# Patient Record
Sex: FEMALE | Race: WHITE | Employment: FULL TIME | URBAN - METROPOLITAN AREA
[De-identification: names, ages, dates, MRNs, and addresses within clinical notes are randomized per-mention and may not be internally consistent; named-entity substitution may affect disease eponyms.]

---

## 2017-01-27 ENCOUNTER — OFFICE VISIT (OUTPATIENT)
Dept: SURGERY | Age: 52
End: 2017-01-27

## 2017-01-27 VITALS
TEMPERATURE: 97.9 F | RESPIRATION RATE: 20 BRPM | WEIGHT: 208 LBS | DIASTOLIC BLOOD PRESSURE: 86 MMHG | HEIGHT: 64 IN | SYSTOLIC BLOOD PRESSURE: 130 MMHG | OXYGEN SATURATION: 98 % | BODY MASS INDEX: 35.51 KG/M2 | HEART RATE: 68 BPM

## 2017-01-27 DIAGNOSIS — E66.01 MORBID OBESITY DUE TO EXCESS CALORIES (HCC): Primary | ICD-10-CM

## 2017-01-27 DIAGNOSIS — D50.9 IRON DEFICIENCY ANEMIA, UNSPECIFIED IRON DEFICIENCY ANEMIA TYPE: ICD-10-CM

## 2017-01-27 DIAGNOSIS — E55.9 VITAMIN D DEFICIENCY: ICD-10-CM

## 2017-01-27 DIAGNOSIS — E53.8 VITAMIN B12 DEFICIENCY: ICD-10-CM

## 2017-01-27 DIAGNOSIS — K90.9 INTESTINAL MALABSORPTION, UNSPECIFIED TYPE: ICD-10-CM

## 2017-01-27 RX ORDER — GLUCOSAMINE SULFATE 1500 MG
POWDER IN PACKET (EA) ORAL DAILY
COMMUNITY
End: 2019-11-26

## 2017-01-27 NOTE — PROGRESS NOTES
Adalid Goodman is a 46 y.o. female 5 years and 4 months status post lap malabsorptive gastric bypass, down 47 pounds. Weight today is 208 pounds. Since last seen on May 2016, patient stated she went off Qsymia for weight loss. Stated was causing urinary tract infections. Started [facility] in October 2016, stated helpful with nutrition. Eating better and making better choices. Stated sticking to 800 calories daily, eating 5-6 small meals daily. Stated exercise needs to increase. Denies nausea, no vomiting, no heartburn/reflux. Denies dysphagia. No fever/no chills, no shortness of breath, no chest pain, and no abdominal pain. Stated having dull ache to left lower abdomen and old lap site. Stated she thinks she may have pulled a muscle when helping with a patient also concern for hernia. Tolerating all foods. Adequate protein intake. Protein shake in morning for breakfast. No snacking. Drinking 64 ounces of water daily. Tolerating all vitamins and medications. Exercising minimal. No issues with urination and bowel movements. HPI    Review of Systems   Constitutional: Negative for chills, fever and malaise/fatigue. Respiratory: Negative for cough, sputum production and shortness of breath. Cardiovascular: Negative for chest pain, palpitations and leg swelling. Gastrointestinal: Negative for abdominal pain, blood in stool, constipation, diarrhea, heartburn, nausea and vomiting. Genitourinary: Negative for dysuria. Physical Exam   Constitutional: She is oriented to person, place, and time. She appears well-developed and well-nourished. No distress. Cardiovascular: Normal rate, regular rhythm and normal heart sounds. Pulmonary/Chest: Effort normal and breath sounds normal. No respiratory distress. She has no wheezes. She has no rales. Abdominal: Soft. Bowel sounds are normal. She exhibits no distension. There is no tenderness. There is no rebound and no guarding.    Abdomen obese,non-distended. Mild tenderness to left lower abdomen at trocar site. No hernia/masses appreciated. Musculoskeletal: Normal range of motion. She exhibits no edema. Neurological: She is alert and oriented to person, place, and time. Skin: Skin is warm and dry. No rash noted. No erythema. Psychiatric: She has a normal mood and affect. Her behavior is normal. Thought content normal.   Blood pressure 130/86, pulse 68, temperature 97.9 °F (36.6 °C), resp. rate 20, height 5' 4\" (1.626 m), weight 208 lb (94.3 kg), SpO2 98 %. ASSESSMENT and PLAN  Morbid Obesity 5 years and 4 months status post lap malabsorptive gastric bypass, down 47 pounds. Weight today is 208 pounds. Commended patient for changes in eating choices and behaviors. Advised patient regard to diet that is high-protein, low-fat, low-sugar, limited carbohydrates. Strive for 60 grams of protein daily. If having a snack, foods that are protein or fiber rich. Still pay attention to behavioral factor and habits. No eating/drinking together, chew foods well, and portion control. Drink at least 40 ounces of non-carbonated, non-calorie beverages daily. Continue vitamin regiment daily. For abdominal discomfort, advised patient may use warm compress for comfort. Reviewed signs and symptoms of hernia. Discussed the need for physical activity to help facilitate more weight loss. Exercise at least 3 days a week with cardiovascular and strength training. Provided patient with routine lab work slip. Advised anything concerning with labs, will contact patient prior to next visit. Patient to follow up in 6 months toa year, sooner if necessary. Patient verbalized understanding and questions were answered to the best of my knowledge and ability. Goal setting educational materials were provided. Advised to call office if any questions/concerns.

## 2017-01-27 NOTE — PATIENT INSTRUCTIONS
Discussed biopsy results with mother. She tried felton syrup for constipation before. Prune juice.   Suggested lactulose and 2-3 week follow up. diogenes Learning About Changing a Habit by Setting Goals  How can you change a habit? If you've decided to change a habit--whether it's quitting smoking, lowering your blood pressure, becoming more active, or doing something else to improve your health--congratulations! Making that decision is the first step toward making a change. What happens next? Have a reason. Set goals you can reach. Prepare for slip-ups. And get support. What's your reason? Your reason for wanting to change a habit is really important. Maybe you want to quit smoking so that you can avoid future health problems. Or maybe you want to eat a healthier diet so you can lose weight. If you have high blood pressure, your reason may be clear: to lower your blood pressure. Maybe you smoke and want to save money on cigarettes. You need to feel ready to make a change. If you don't feel ready now, that's okay. You can still be thinking and planning. When you truly want to make changes, you're ready for the next step. It's not easy to change habits--but you can do it. Taking the time to really think about what will motivate or inspire you will help you reach your goals. How do you set goals? · Focus on small goals. This will help you reach larger goals over time. With smaller goals, you'll have success more often, which will help you stay with it. For example, your large goal may be to lose 50 pounds. Your small goal could be to lose 5.  · Write down your goals. This will help you remember, and you'll have a clearer idea of what you want to achieve. Use a journal or notebook to record your goals. Hang up your plan where you will see it often as a reminder of what you're trying to do. · Make your goals specific. Specific goals help you measure your progress. For example, setting a goal to eat 5 helpings of fruits and vegetables 5 days a week is better than a general goal to \"eat more vegetables. \"  · Focus on one goal at a time.  By doing this, you're less likely to feel overwhelmed and then give up. · When you reach a goal, reward yourself. Celebrate your new behavior and success for several days, and then think about setting your next goal.  How can you prepare for slip-ups? It's perfectly normal to try to change a habit, go along fine for a while, and then have a setback. Lots of people try and try again before they reach their goals. What are the things that might cause a setback for you? If you have tried to change a habit before, think about what helped you and what got in your way. By thinking about these barriers now, you can plan ahead for how to deal with them if they happen. There will be times when you slip up and don't make your goal for the week. When that happens, don't get mad at yourself. Learn from the experience. Ask yourself what got in the way of reaching your goal. Positive thinking goes a long way when you're making lifestyle changes. How can you get support? · Get a partner. It's motivating to know that someone is trying to make the same change that you're making, like being more active or changing your eating habits. You have someone who is counting on you to help him or her succeed. That person can also remind you how far you've come. · Get friends and family involved. They can exercise with you or encourage you by saying how they admire what you are doing. Family members can join you in your healthy eating efforts. Don't be afraid to tell family and friends that their encouragement makes a big difference to you. · Join a class or support group. People in these groups often have some of the same barriers you have. They can give you support when you don't feel like staying with your plan. They can boost your morale when you need a lift. Jessie Andrade also find a number of online support groups. · Encourage yourself. When you feel like giving up, don't waste energy feeling bad about yourself.  Remember your reason for wanting to change, think about the progress you've made, and give yourself a pep talk and a pat on the back. · Get professional help. A dietitian can help you make your diet healthier while still allowing you to eat foods that you enjoy. A  or physical therapist can help design an exercise program that is fun and easy to stay on. A counselor, a , or your doctor can help you overcome hurdles, reduce stress, or quit smoking. Where can you learn more? Go to http://florencia-maurice.info/. Enter V577 in the search box to learn more about \"Learning About Changing a Habit by Setting Goals. \"  Current as of: July 26, 2016  Content Version: 11.1  © 6280-4709 [employer]. Care instructions adapted under license by [employer] (which disclaims liability or warranty for this information). If you have questions about a medical condition or this instruction, always ask your healthcare professional. [place] [address] any warranty or liability for your use of this information.

## 2017-01-27 NOTE — MR AVS SNAPSHOT
Visit Information Date & Time Provider Department Dept. Phone Encounter #  
 1/27/2017  9:00 AM Shelley Menjivar NP [place] [id number] [phone] [id number] Upcoming Health Maintenance Date Due Hepatitis C Screening 1965 DTaP/Tdap/Td series (1 - Tdap) 12/1/1984 FOBT Q 1 YEAR AGE 50-75 12/1/2013 INFLUENZA AGE 9 TO ADULT 8/1/2016 PAP AKA CERVICAL CYTOLOGY 2/2/2018 BREAST CANCER SCRN MAMMOGRAM 3/24/2018 Allergies as of 1/27/2017  Review Complete On: 1/27/2017 By: Shelley Menjivar NP Severity Noted Reaction Type Reactions Other Medication  05/26/2011    Other (comments) Allergic to mold, pollen,cigarettes, causes burning eyes, headaches Current Immunizations  Reviewed on 9/21/2011 Name Date  
 TB Skin Test (PPD) Intradermal 6/22/2016  4:55 PM  
  
 Not reviewed this visit You Were Diagnosed With   
  
 Codes Comments Morbid obesity due to excess calories [facility]    -  Primary ICD-10-CM: E66.01 
ICD-9-CM: 278.01 Intestinal malabsorption, unspecified type     ICD-10-CM: K90.9 ICD-9-CM: 579.9 Vitamin B12 deficiency     ICD-10-CM: E53.8 ICD-9-CM: 266.2 Vitamin D deficiency     ICD-10-CM: E55.9 ICD-9-CM: 268.9 Iron deficiency anemia, unspecified iron deficiency anemia type     ICD-10-CM: D50.9 ICD-9-CM: 280.9 BMI 35.0-35.9,adult     ICD-10-CM: B28.60 ICD-9-CM: V85.35 Vitals BP Pulse Temp Resp Height(growth percentile) Weight(growth percentile) 130/86 (BP 1 Location: Left arm, BP Patient Position: Sitting) 68 97.9 °F (36.6 °C) 20 5' 4\" (1.626 m) 208 lb (94.3 kg) SpO2 BMI OB Status Smoking Status 98% 35.7 kg/m2 Ablation Never Smoker Vitals History BMI and BSA Data Body Mass Index Body Surface Area 35.7 kg/m 2 2.06 m 2 Preferred Pharmacy Pharmacy Name Phone  [facility] [address] [place] [address] [facility] [facility]   [address] [phone] Your Updated Medication List  
  
   
This list is accurate as of: 1/27/17  9:39 AM.  Always use your most recent med list.  
  
  
  
  
 ascorbic acid (vitamin C) 500 mg tablet Commonly known as:  VITAMIN C Take 500 mg by mouth daily. Biotin 2,500 mcg Cap Take 1 Cap by mouth daily. CALCIUM CITRATE + D PO Take  by mouth. * VITAMIN D3 1,000 unit Cap Generic drug:  cholecalciferol Take  by mouth daily. * cholecalciferol (vitamin D3) 50,000 unit Tab Take 1 Tab by mouth every seven (7) days. cyanocobalamin 1,000 mcg sublingual tablet Commonly known as:  VITAMIN B-12 Take 1,000 mcg by mouth daily. EFFEXOR  mg capsule Generic drug:  venlafaxine-SR Take  by mouth daily. iron aspgly,[id number]-Ca-suc [id number] mg-mg-mcg-mg Cap cap Commonly known as:  FERREX [address] Take 1 Cap by mouth daily. MULTIVITAMIN PO Take 1 Tab by mouth daily. USES CENTRUM  
  
 phentermine-topiramate 7.5-46 mg Cm24 Take  by mouth. * Notice: This list has 2 medication(s) that are the same as other medications prescribed for you. Read the directions carefully, and ask your doctor or other care provider to review them with you. We Performed the Following CBC W/O DIFF [57702 CPT(R)] IRON PROFILE [id number] CPT(R)] METABOLIC PANEL, COMPREHENSIVE [21708 CPT(R)] PTH INTACT [13942 CPT(R)] VITAMIN B12 & FOLATE [70700 CPT(R)] VITAMIN D, 25 HYDROXY [id number] CPT(R)] Patient Instructions Learning About Changing a Habit by Setting Goals How can you change a habit? If you've decided to change a habitwhether it's quitting smoking, lowering your blood pressure, becoming more active, or doing something else to improve your healthcongratulations! Making that decision is the first step toward making a change. What happens next? Have a reason. Set goals you can reach. Prepare for slip-ups. And get support. What's your reason? Your reason for wanting to change a habit is really important. Maybe you want to quit smoking so that you can avoid future health problems. Or maybe you want to eat a healthier diet so you can lose weight. If you have high blood pressure, your reason may be clear: to lower your blood pressure. Maybe you smoke and want to save money on cigarettes. You need to feel ready to make a change. If you don't feel ready now, that's okay. You can still be thinking and planning. When you truly want to make changes, you're ready for the next step. It's not easy to change habitsbut you can do it. Taking the time to really think about what will motivate or inspire you will help you reach your goals. How do you set goals? · Focus on small goals. This will help you reach larger goals over time. With smaller goals, you'll have success more often, which will help you stay with it. For example, your large goal may be to lose 50 pounds. Your small goal could be to lose 5. 
· Write down your goals. This will help you remember, and you'll have a clearer idea of what you want to achieve. Use a journal or notebook to record your goals. Hang up your plan where you will see it often as a reminder of what you're trying to do. · Make your goals specific. Specific goals help you measure your progress. For example, setting a goal to eat 5 helpings of fruits and vegetables 5 days a week is better than a general goal to \"eat more vegetables. \" 
· Focus on one goal at a time. By doing this, you're less likely to feel overwhelmed and then give up. · When you reach a goal, reward yourself. Celebrate your new behavior and success for several days, and then think about setting your next goal. 
How can you prepare for slip-ups?  
It's perfectly normal to try to change a habit, go along fine for a while, and then have a setback. Lots of people try and try again before they reach their goals. What are the things that might cause a setback for you? If you have tried to change a habit before, think about what helped you and what got in your way. By thinking about these barriers now, you can plan ahead for how to deal with them if they happen. There will be times when you slip up and don't make your goal for the week. When that happens, don't get mad at yourself. Learn from the experience. Ask yourself what got in the way of reaching your goal. Positive thinking goes a long way when you're making lifestyle changes. How can you get support? · Get a partner. It's motivating to know that someone is trying to make the same change that you're making, like being more active or changing your eating habits. You have someone who is counting on you to help him or her succeed. That person can also remind you how far you've come. · Get friends and family involved. They can exercise with you or encourage you by saying how they admire what you are doing. Family members can join you in your healthy eating efforts. Don't be afraid to tell family and friends that their encouragement makes a big difference to you. · Join a class or support group. People in these groups often have some of the same barriers you have. They can give you support when you don't feel like staying with your plan. They can boost your morale when you need a lift. Marvel Ambrosio also find a number of online support groups. · Encourage yourself. When you feel like giving up, don't waste energy feeling bad about yourself. Remember your reason for wanting to change, think about the progress you've made, and give yourself a pep talk and a pat on the back. · Get professional help. A dietitian can help you make your diet healthier while still allowing you to eat foods that you enjoy.  A  or physical therapist can help design an exercise program that is fun and easy to stay on. A counselor, a , or your doctor can help you overcome hurdles, reduce stress, or quit smoking. Where can you learn more? Go to http://florencia-maurice.info/. Enter E891 in the search box to learn more about \"Learning About Changing a Habit by Setting Goals. \" Current as of: July 26, 2016 Content Version: 11.1 © 1142-2292 [employer]. Care instructions adapted under license by [employer] (which disclaims liability or warranty for this information). If you have questions about a medical condition or this instruction, always ask your healthcare professional. [place] [address] any warranty or liability for your use of this information. Introducing [facility]! [address] introduces [employer] patient portal. Now you can access parts of your medical record, email your doctor's office, and request medication refills online. 1. In your internet browser, go to [email or web address] 2. Click on the First Time User? Click Here link in the Sign In box. You will see the New Member Sign Up page. 3. Enter your [employer] Access Code exactly as it appears below. You will not need to use this code after youve completed the sign-up process. If you do not sign up before the expiration date, you must request a new code. · [employer] Access Code: [email or web address] Expires: 4/27/2017  9:39 AM 
 
4. Enter the last four digits of your Social Security Number (xxxx) and Date of Birth (mm/dd/yyyy) as indicated and click Submit. You will be taken to the next sign-up page. 5. Create a [employer] ID. This will be your [employer] login ID and cannot be changed, so think of one that is secure and easy to remember. 6. Create a [employer] password. You can change your password at any time. 7. Enter your Password Reset Question and Answer. This can be used at a later time if you forget your password. 8. Enter your e-mail address. You will receive e-mail notification when new information is available in [address] 9. Click Sign Up. You can now view and download portions of your medical record. 10. Click the Download Summary menu link to download a portable copy of your medical information. If you have questions, please visit the Frequently Asked Questions section of the [employer] website. Remember, [employer] is NOT to be used for urgent needs. For medical emergencies, dial 911. Now available from your iPhone and Android! Please provide this summary of care documentation to your next provider. Your primary care clinician is listed as Yael Schaffer. If you have any questions after today's visit, please call [phone].

## 2017-01-27 NOTE — PROGRESS NOTES
1. Have you been to the ER, urgent care clinic since your last visit? Hospitalized since your last visit? No    2. Have you seen or consulted any other health care providers outside of the [facility] since your last visit? Include any pap smears or colon screening.  No

## 2017-01-28 LAB
25(OH)D3+25(OH)D2 SERPL-MCNC: 38.9 NG/ML (ref 30–100)
ALBUMIN SERPL-MCNC: 3.9 G/DL (ref 3.5–5.5)
ALBUMIN/GLOB SERPL: 1.4 {RATIO} (ref 1.1–2.5)
ALP SERPL-CCNC: 87 IU/L (ref 39–117)
ALT SERPL-CCNC: 24 IU/L (ref 0–32)
AST SERPL-CCNC: 21 IU/L (ref 0–40)
BILIRUB SERPL-MCNC: 0.2 MG/DL (ref 0–1.2)
BUN SERPL-MCNC: 17 MG/DL (ref 6–24)
BUN/CREAT SERPL: 23 (ref 9–23)
CALCIUM SERPL-MCNC: 8.8 MG/DL (ref 8.7–10.2)
CHLORIDE SERPL-SCNC: 103 MMOL/L (ref 96–106)
CO2 SERPL-SCNC: 27 MMOL/L (ref 18–29)
CREAT SERPL-MCNC: 0.73 MG/DL (ref 0.57–1)
ERYTHROCYTE [DISTWIDTH] IN BLOOD BY AUTOMATED COUNT: 15.1 % (ref 12.3–15.4)
FOLATE SERPL-MCNC: >20 NG/ML
GLOBULIN SER CALC-MCNC: 2.8 G/DL (ref 1.5–4.5)
GLUCOSE SERPL-MCNC: 94 MG/DL (ref 65–99)
HCT VFR BLD AUTO: 35.4 % (ref 34–46.6)
HGB BLD-MCNC: 11.4 G/DL (ref 11.1–15.9)
IRON SATN MFR SERPL: 24 % (ref 15–55)
IRON SERPL-MCNC: 86 UG/DL (ref 27–159)
MCH RBC QN AUTO: 26.2 PG (ref 26.6–33)
MCHC RBC AUTO-ENTMCNC: 32.2 G/DL (ref 31.5–35.7)
MCV RBC AUTO: 81 FL (ref 79–97)
PLATELET # BLD AUTO: 163 X10E3/UL (ref 150–379)
POTASSIUM SERPL-SCNC: 4.3 MMOL/L (ref 3.5–5.2)
PROT SERPL-MCNC: 6.7 G/DL (ref 6–8.5)
PTH-INTACT SERPL-MCNC: 32 PG/ML (ref 15–65)
RBC # BLD AUTO: 4.35 X10E6/UL (ref 3.77–5.28)
SODIUM SERPL-SCNC: 143 MMOL/L (ref 134–144)
TIBC SERPL-MCNC: 352 UG/DL (ref 250–450)
UIBC SERPL-MCNC: 266 UG/DL (ref 131–425)
VIT B12 SERPL-MCNC: >2000 PG/ML (ref 211–946)
WBC # BLD AUTO: 4.3 X10E3/UL (ref 3.4–10.8)

## 2017-02-27 ENCOUNTER — PATIENT OUTREACH (OUTPATIENT)
Dept: OTHER | Age: 52
End: 2017-02-27

## 2017-02-27 NOTE — PROGRESS NOTES
2017  Ms. Karolina Lloyd returned my call. Verified  and address for HIPAA security. Offered Employee Care Management services and discussed what could be offered with care coordination, education and support for illness, and general support for positive lifestyle changes for better health outcomes. Ms. Karolina Lloyd is taking steps for healthy lifestyle with weekly attendance at a weight loss program.  She is also interested in increasing her activity level, but acknowledges that working two jobs makes it hard to achieve her goals. She is interested in CM services. Arranged for intake on 2017 during her lunch hour. She will call me when she is free (estimating 2PM) and will be thinking on some goals she has for us to work together.

## 2017-02-27 NOTE — PROGRESS NOTES
2/27/2017   Patient on report for eligible for Case Management. Left message on voicemail. Will attempt to contact again to offer Care Management services.

## 2017-03-01 ENCOUNTER — PATIENT OUTREACH (OUTPATIENT)
Dept: OTHER | Age: 52
End: 2017-03-01

## 2017-03-02 NOTE — PROGRESS NOTES
Attempted to reach Ms. Jemima Womack for initial assessment for Care Management. 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 I called at 2:30pm and left a message for her to contact me with better times to reach her. Will try again 03/03/2017 if she does not contact me.

## 2017-03-03 ENCOUNTER — PATIENT OUTREACH (OUTPATIENT)
Dept: OTHER | Age: 52
End: 2017-03-03

## 2017-03-03 NOTE — LETTER
3/3/2017 2:20 PM 
 
Ms. Gladis Larson [address] [facility] [address] Dear Ms. Amira Dumont My name is Kim Chaivs RN, MS , Employee Care Manager for [employer] and I have been trying to reach you. The Employee Care  is a free-of-charge confidential service provided to our employees and their family memebers covered by the [facility]. The program will provide an employee and his/her family with the [employer] expertise to assist in navigating the health care delivery system, provider services, and their overall care needsso as to assure and improve health care interactions and enhance the quality of life. This program is designed to provide you with the opportunity to have a [employer] care manager partner with you for the following services: 
 
1.)  Care transitions-such as when you come home from the hospital 
2.) When help is needed to manage your disease process 3.) When you are faced with managing a chronic or complex medical condition [employer] is dedicated to empowering the good health of its community and improving the quality of care and care experiences for employees and their families. We are commited to safeguarding patient confidentiality and privacy,  assuring that every employee has the respect he or she deserves in managing their soumya. The information shared with your care manager will not be shared with anyone else aside from those you identify as part of your care team, and will only be used to assist you with any identified care needs. Please contact me if you would like this service provided to you.   
 
Sincerely, 
 
 
Kim Chavis RN

## 2017-03-03 NOTE — PROGRESS NOTES
Patient identified as eligible for [address] [place] [address] services. Third telephone outreach. First call returned with intent to enroll in CM. This is second attempted contact for completing initial assessment. Left discreet voicemail with this CM confidential contact information. Will send UTR letter.

## 2017-03-24 ENCOUNTER — PATIENT OUTREACH (OUTPATIENT)
Dept: OTHER | Age: 52
End: 2017-03-24

## 2017-03-24 NOTE — PROGRESS NOTES
Patient indicated in previous call that she was interested in [address] services. Telephone outreach attempted. Left discreet voicemail with this CM confidential contact information. Will send lost contact letter via mail. No My Chart.

## 2017-04-06 ENCOUNTER — OFFICE VISIT (OUTPATIENT)
Dept: INTERNAL MEDICINE CLINIC | Age: 52
End: 2017-04-06

## 2017-04-06 VITALS
SYSTOLIC BLOOD PRESSURE: 126 MMHG | TEMPERATURE: 98.1 F | HEIGHT: 64 IN | BODY MASS INDEX: 36.19 KG/M2 | RESPIRATION RATE: 16 BRPM | HEART RATE: 79 BPM | OXYGEN SATURATION: 98 % | DIASTOLIC BLOOD PRESSURE: 76 MMHG | WEIGHT: 212 LBS

## 2017-04-06 DIAGNOSIS — M25.511 CHRONIC RIGHT SHOULDER PAIN: ICD-10-CM

## 2017-04-06 DIAGNOSIS — G89.29 CHRONIC RIGHT SHOULDER PAIN: ICD-10-CM

## 2017-04-06 DIAGNOSIS — L20.9 ATOPIC DERMATITIS, UNSPECIFIED TYPE: ICD-10-CM

## 2017-04-06 DIAGNOSIS — L72.9 SKIN CYST: ICD-10-CM

## 2017-04-06 DIAGNOSIS — K13.0 CHEILITIS: Primary | ICD-10-CM

## 2017-04-06 DIAGNOSIS — Z98.84 HISTORY OF GASTRIC BYPASS: ICD-10-CM

## 2017-04-06 DIAGNOSIS — E61.1 IRON DEFICIENCY: ICD-10-CM

## 2017-04-06 RX ORDER — TRIAMCINOLONE ACETONIDE 5 MG/G
OINTMENT TOPICAL 2 TIMES DAILY
Qty: 30 G | Refills: 2 | Status: SHIPPED | OUTPATIENT
Start: 2017-04-06 | End: 2017-04-11

## 2017-04-06 RX ORDER — TRIAMCINOLONE ACETONIDE 1 MG/G
OINTMENT TOPICAL 2 TIMES DAILY
Qty: 30 G | Refills: 2 | Status: SHIPPED | OUTPATIENT
Start: 2017-04-06 | End: 2017-04-11

## 2017-04-06 RX ORDER — FLUCONAZOLE 200 MG/1
TABLET ORAL
Qty: 15 TAB | Refills: 0 | Status: SHIPPED | OUTPATIENT
Start: 2017-04-06 | End: 2017-08-11

## 2017-04-06 RX ORDER — NYSTATIN 100000 U/G
OINTMENT TOPICAL
Qty: 30 G | Refills: 1 | Status: SHIPPED | OUTPATIENT
Start: 2017-04-06 | End: 2017-08-11

## 2017-04-06 RX ORDER — IRON,CARBONYL/ASCORBIC ACID 65MG-125MG
TABLET, DELAYED RELEASE (ENTERIC COATED) ORAL
Refills: 2 | COMMUNITY
Start: 2017-02-08 | End: 2019-11-26

## 2017-04-06 NOTE — MR AVS SNAPSHOT
Visit Information Date & Time Provider Department Dept. Phone Encounter #  
 4/6/2017  1:30 PM Ruslan Santos, [address] [place] [address], Ne and Internal Medicine [phone] [id number] Follow-up Instructions Return if symptoms worsen or fail to improve. Upcoming Health Maintenance Date Due Hepatitis C Screening 1965 DTaP/Tdap/Td series (1 - Tdap) 12/1/1984 FOBT Q 1 YEAR AGE 50-75 12/1/2013 INFLUENZA AGE 9 TO ADULT 8/1/2016 PAP AKA CERVICAL CYTOLOGY 2/2/2018 BREAST CANCER SCRN MAMMOGRAM 3/24/2018 Allergies as of 4/6/2017  Review Complete On: 4/6/2017 By: Ruslan Santos MD  
  
 Severity Noted Reaction Type Reactions Other Medication  05/26/2011    Other (comments) Allergic to mold, pollen,cigarettes, causes burning eyes, headaches Current Immunizations  Reviewed on 9/21/2011 Name Date  
 TB Skin Test (PPD) Intradermal 6/22/2016  4:55 PM  
  
 Not reviewed this visit You Were Diagnosed With   
  
 Codes Comments Cheilitis    -  Primary ICD-10-CM: K13.0 ICD-9-CM: 528.5 Atopic dermatitis, unspecified type     ICD-10-CM: L20.9 ICD-9-CM: 691.8 History of gastric bypass     ICD-10-CM: Z98.890 ICD-9-CM: V45.86 Iron deficiency     ICD-10-CM: E61.1 ICD-9-CM: 280.9 Skin cyst     ICD-10-CM: L72.9 ICD-9-CM: 706. 2 Chronic right shoulder pain     ICD-10-CM: M25.511, G89.29 ICD-9-CM: 719.41, 338.29 Vitals BP Pulse Temp Resp Height(growth percentile) Weight(growth percentile) 126/76 (BP 1 Location: Left arm, BP Patient Position: Sitting) 79 98.1 °F (36.7 °C) (Oral) 16 5' 4\" (1.626 m) 212 lb (96.2 kg) SpO2 BMI OB Status Smoking Status 98% 36.39 kg/m2 Ablation Never Smoker BMI and BSA Data Body Mass Index Body Surface Area  
 36.39 kg/m 2 2.08 m 2 Preferred Pharmacy Pharmacy Name Phone  [address] [phone] Your Updated Medication List  
  
   
This list is accurate as of: 4/6/17  2:33 PM.  Always use your most recent med list.  
  
  
  
  
 ascorbic acid (vitamin C) 500 mg tablet Commonly known as:  VITAMIN C Take 500 mg by mouth daily. Biotin 2,500 mcg Cap Take 1 Cap by mouth daily. CALCIUM CITRATE + D PO Take  by mouth. * VITAMIN D3 1,000 unit Cap Generic drug:  cholecalciferol Take  by mouth daily. * cholecalciferol (vitamin D3) 50,000 unit Tab Take 1 Tab by mouth every seven (7) days. cyanocobalamin 1,000 mcg sublingual tablet Commonly known as:  VITAMIN B-12 Take 1,000 mcg by mouth daily. EFFEXOR  mg capsule Generic drug:  venlafaxine-SR Take  by mouth daily. fluconazole 200 mg tablet Commonly known as:  DIFLUCAN Take 2 tabs once for first day's dose, then 1 PO daily for 7-14 days as directed for cheilitis. iron aspgly,[id number]-O-S47-FA-Ca-suc 060-20-14-4 mg-mg-mcg-mg Cap cap Commonly known as:  FERREX [address] Take 1 Cap by mouth daily. MULTIVITAMIN PO Take 1 Tab by mouth daily. USES CENTRUM  
  
 nystatin 100,000 unit/gram ointment Commonly known as:  MYCOSTATIN Use 3-4 times daily to rash around mouth/lips, as directed. phentermine-topiramate 7.5-46 mg Cm24 Take  by mouth. * triamcinolone acetonide 0.1 % ointment Commonly known as:  KENALOG Apply  to affected area two (2) times a day for 5 days. use thin layer to eyebrow/eyelid rash * triamcinolone acetonide 0.5 % ointment Commonly known as:  KENALOG Apply  to affected area two (2) times a day for 5 days. use thin layer to eyebrow/eyelid rash VITRON-C 65 mg iron- 125 mg Tbec Generic drug:  iron,carbonyl-vitamin C TK 1 T PO BID * Notice: This list has 4 medication(s) that are the same as other medications prescribed for you.  Read the directions carefully, and ask your doctor or other care provider to review them with you. Prescriptions Printed Refills  
 triamcinolone acetonide (KENALOG) 0.5 % ointment 2 Sig: Apply  to affected area two (2) times a day for 5 days. use thin layer to eyebrow/eyelid rash Class: Print Route: Topical  
 fluconazole (DIFLUCAN) 200 mg tablet 0 Sig: Take 2 tabs once for first day's dose, then 1 PO daily for 7-14 days as directed for cheilitis. Class: Print Prescriptions Sent to Pharmacy Refills  
 triamcinolone acetonide (KENALOG) 0.1 % ointment 2 Sig: Apply  to affected area two (2) times a day for 5 days. use thin layer to eyebrow/eyelid rash Class: Normal  
 Pharmacy: [address] Ph #: [phone] Route: Topical  
 nystatin (MYCOSTATIN) 100,000 unit/gram ointment 1 Sig: Use 3-4 times daily to rash around mouth/lips, as directed. Class: Normal  
 Pharmacy: [address] Ph #: [phone] We Performed the Following CBC WITH AUTOMATED DIFF [48010 CPT(R)] FERRITIN [94848 CPT(R)] IRON PROFILE [id number] CPT(R)] REFERRAL TO ORTHOPEDICS [[id number] Custom] Comments:  
 Please evaluate patient for right forearm cyst, right shoulder pain. REFERRAL TO ORTHOPEDICS [[id number] Custom] Comments:  
 Please evaluate patient for right forearm cyst, right shoulder pain. VITAMIN D, 25 HYDROXY [id number] CPT(R)] Follow-up Instructions Return if symptoms worsen or fail to improve. To-Do List   
 04/13/2017 12:15 PM  
  Appointment with [facility] 4 at [address] [place] [address] ([phone]) Shower or bathe using soap and water.   Do not use deodorant, powder, perfumes, or lotion the day of your exam.  If your prior mammograms were not performed at [facility] please bring films with you or forward prior images 2 days before your procedure. Check in at registration 15min before your appointment time unless you were instructed to do otherwise. A script is not necessary, but if you have one, please bring it on the day of the mammogram or have it faxed to the department. [facility] [phone] [facility]  [phone] [facility] [address] [place]  [phone] [facility] [phone] [facility] 30[address] [facility] Janet Mccoy [phone] Referral Information Referral ID Referred By Referred To  
  
 [id number] Sana Manzano MD   
   [address] Phone: [phone] Fax: [phone] Visits Status Start Date End Date 1 New Request 4/6/17 4/6/18 If your referral has a status of pending review or denied, additional information will be sent to support the outcome of this decision. Referral ID Referred By Referred To  
 [id number] Eliezer Morataya MD  
   [address] [facility] [address] [facility], [address] Phone: [phone] Fax: [phone] Visits Status Start Date End Date 1 New Request 4/6/17 4/6/18 If your referral has a status of pending review or denied, additional information will be sent to support the outcome of this decision. Patient Instructions Maritza Foley 1728 What is a living will? A living will is a legal form you use to write down the kind of care you want at the end of your life. It is used by the health professionals who will treat you if you aren't able to decide for yourself. If you put your wishes in writing, your loved ones and others will know what kind of care you want. They won't need to guess. This can ease your mind and be helpful to others. A living will is not the same as an estate or property will. An estate will explains what you want to happen with your money and property after you die. Is a living will a legal document? A living will is a legal document. Each state has its own laws about living gary. If you move to another state, make sure that your living will is legal in the state where you now live. Or you might use a universal form that has been approved by many states. This kind of form can sometimes be completed and stored online. Your electronic copy will then be available wherever you have a connection to the Internet. In most cases, doctors will respect your wishes even if you have a form from a different state. · You don't need an  to complete a living will. But legal advice can be helpful if your state's laws are unclear, your health history is complicated, or your family can't agree on what should be in your living will. · You can change your living will at any time. Some people find that their wishes about end-of-life care change as their health changes. · In addition to making a living will, think about completing a medical power of  form. This form lets you name the person you want to make end-of-life treatment decisions for you (your \"health care agent\") if you're not able to. Many hospitals and nursing homes will give you the forms you need to complete a living will and a medical power of . · Your living will is used only if you can't make or communicate decisions for yourself anymore. If you become able to make decisions again, you can accept or refuse any treatment, no matter what you wrote in your living will. · Your state may offer an online registry. This is a place where you can store your living will online so the doctors and nurses who need to treat you can find it right away. What should you think about when creating a living will? Talk about your end-of-life wishes with your family members and your doctor. Let them know what you want. That way the people making decisions for you won't be surprised by your choices. Think about these questions as you make your living will: · Do you know enough about life support methods that might be used? If not, talk to your doctor so you know what might be done if you can't breathe on your own, your heart stops, or you're unable to swallow. · What things would you still want to be able to do after you receive life-support methods? Would you want to be able to walk? To speak? To eat on your own? To live without the help of machines? · If you have a choice, where do you want to be cared for? In your home? At a hospital or nursing home? · Do you want certain [employer] practices performed if you become very ill? · If you have a choice at the end of your life, where would you prefer to die? At home? In a hospital or nursing home? Somewhere else? · Would you prefer to be buried or cremated? · Do you want your organs to be donated after you die? What should you do with your living will? · Make sure that your family members and your health care agent have copies of your living will. · Give your doctor a copy of your living will to keep in your medical record. If you have more than one doctor, make sure that each one has a copy. · You may want to put a copy of your living will where it can be easily found. Where can you learn more? Go to http://florencia-maurice.info/. Enter D348 in the search box to learn more about \"Learning About Living Nehemias Lockett. \" Current as of: February 24, 2016 Content Version: 11.2 © 3720-2646 [employer]. Care instructions adapted under license by [employer] (which disclaims liability or warranty for this information). If you have questions about a medical condition or this instruction, always ask your healthcare professional. [place] [address] any warranty or liability for your use of this information. Advance Directives: Care Instructions Your Care Instructions An advance directive is a legal way to state your wishes at the end of your life. It tells your family and your doctor what to do if you can no longer say what you want. There are two main types of advance directives. You can change them any time that your wishes change. · A living will tells your family and your doctor your wishes about life support and other treatment. · A durable power of  for health care lets you name a person to make treatment decisions for you when you can't speak for yourself. This person is called a health care agent. If you do not have an advance directive, decisions about your medical care may be made by a doctor or a  who doesn't know you. It may help to think of an advance directive as a gift to the people who care for you. If you have one, they won't have to make tough decisions by themselves. Follow-up care is a key part of your treatment and safety. Be sure to make and go to all appointments, and call your doctor if you are having problems. It's also a good idea to know your test results and keep a list of the medicines you take. How can you care for yourself at home? · Discuss your wishes with your loved ones and your doctor. This way, there are no surprises. · Many states have a unique form. Or you might use a universal form that has been approved by many states. This kind of form can sometimes be completed and stored online. Your electronic copy will then be available wherever you have a connection to the Internet. In most cases, doctors will respect your wishes even if you have a form from a different state. · You don't need a  to do an advance directive. But you may want to get legal advice. · Think about these questions when you prepare an advance directive: ¨ Who do you want to make decisions about your medical care if you are not able to? Many people choose a family member or close friend. ¨ Do you know enough about life support methods that might be used? If not, talk to your doctor so you understand. ¨ What are you most afraid of that might happen? You might be afraid of having pain, losing your independence, or being kept alive by machines. ¨ Where would you prefer to die? Choices include your home, a hospital, or a nursing home. ¨ Would you like to have information about hospice care to support you and your family? ¨ Do you want to donate organs when you die? ¨ Do you want certain Adventist practices performed before you die? If so, put your wishes in the advance directive. · Read your advance directive every year, and make changes as needed. When should you call for help? Be sure to contact your doctor if you have any questions. Where can you learn more? Go to http://florencia-maurice.info/. Enter R264 in the search box to learn more about \"Advance Directives: Care Instructions. \" Current as of: November 17, 2016 Content Version: 11.2 © 9104-5013 [employer]. Care instructions adapted under license by [employer] (which disclaims liability or warranty for this information). If you have questions about a medical condition or this instruction, always ask your healthcare professional. [place] [address] any warranty or liability for your use of this information. Introducing \[facility]\"" & HEALTH SERVICES! Lazarus Epstein introduces Imagiin. patient portal. Now you can access parts of your medical record, email your doctor's office, and request medication refills online. 1. In your internet browser, go to [email or web address] 2. Click on the First Time User? Click Here link in the Sign In box. You will see the New Member Sign Up page. 3. Enter your Imagiin. Access Code exactly as it appears below. You will not need to use this code after youve completed the sign-up process.  If you do not sign up before the expiration date, you must request a new code. · TheMobileGamer (TMG) Access Code: [email or web address] Expires: 4/27/2017 10:39 AM 
 
4. Enter the last four digits of your Social Security Number (xxxx) and Date of Birth (mm/dd/yyyy) as indicated and click Submit. You will be taken to the next sign-up page. 5. Create a TheMobileGamer (TMG) ID. This will be your TheMobileGamer (TMG) login ID and cannot be changed, so think of one that is secure and easy to remember. 6. Create a TheMobileGamer (TMG) password. You can change your password at any time. 7. Enter your Password Reset Question and Answer. This can be used at a later time if you forget your password. 8. Enter your e-mail address. You will receive e-mail notification when new information is available in [address] 9. Click Sign Up. You can now view and download portions of your medical record. 10. Click the Download Summary menu link to download a portable copy of your medical information. If you have questions, please visit the Frequently Asked Questions section of the TheMobileGamer (TMG) website. Remember, TheMobileGamer (TMG) is NOT to be used for urgent needs. For medical emergencies, dial 911. Now available from your iPhone and Android! Please provide this summary of care documentation to your next provider. Your primary care clinician is listed as Stan Escamilla. If you have any questions after today's visit, please call [phone].

## 2017-04-06 NOTE — PROGRESS NOTES
HISTORY OF PRESENT ILLNESS  Aguila Grajeda is a 46 y.o. female. HPI  Here to establish care for rash face/eyes/osman-oral and nodule right forearm. Chief Complaint   Patient presents with    Rash     face, eyes    Nodule     right forearm, patient states it has been present for more than one year, now having some discomfort     Notes had used Desonide topically (had for her eyes), TAC, and triple antibiotic, which didn't help. She notes used a zinc oxide cream which helped only slightly. She had seen dermatology prior, but didn't take current insurance. ROS  Complete ROS negative except as indicated in note. Blood pressure 126/76, pulse 79, temperature 98.1 °F (36.7 °C), temperature source Oral, resp. rate 16, height 5' 4\" (1.626 m), weight 212 lb (96.2 kg), SpO2 98 %. Physical Exam   Constitutional: She appears well-developed and well-nourished. No distress. HENT:   Head: Normocephalic and atraumatic. Mouth/Throat: Oropharynx is clear and moist. No oropharyngeal exudate. No thrush noted. Mild cheilitis bilat angles mouth. Eyes: Conjunctivae are normal. Right eye exhibits no discharge. Left eye exhibits no discharge. No scleral icterus. Minimal erythema upper eyelids bilat. Neck: Neck supple. Cardiovascular: Normal rate, regular rhythm, normal heart sounds and intact distal pulses. Exam reveals no gallop and no friction rub. No murmur heard. Pulmonary/Chest: Effort normal and breath sounds normal. No respiratory distress. She has no wheezes. She has no rales. She exhibits no tenderness. Abdominal: She exhibits no distension. Musculoskeletal: She exhibits no edema or tenderness. Neurological: She is alert. She exhibits normal muscle tone. Coordination normal.   Skin: Skin is warm. Rash noted. She is not diaphoretic. No erythema. No pallor. Psychiatric: She has a normal mood and affect.  Her behavior is normal. Judgment and thought content normal. ASSESSMENT and PLAN    ICD-10-CM ICD-9-CM    1. Cheilitis K13.0 528.5 fluconazole (DIFLUCAN) 200 mg tablet      nystatin (MYCOSTATIN) 100,000 unit/gram ointment   2. Atopic dermatitis, unspecified type L20.9 691.8 triamcinolone acetonide (KENALOG) 0.1 % ointment      triamcinolone acetonide (KENALOG) 0.5 % ointment   3. History of gastric bypass Z98.890 V45.86 VITAMIN D, 25 HYDROXY   4. Iron deficiency E61.1 280.9 CBC WITH AUTOMATED DIFF      IRON PROFILE      FERRITIN   5. Skin cyst L72.9 706.2 REFERRAL TO ORTHOPEDICS      REFERRAL TO ORTHOPEDICS   6. Chronic right shoulder pain M25.511 719.41 REFERRAL TO ORTHOPEDICS    G89.29 338.29 REFERRAL TO ORTHOPEDICS       1. Topical anti-fungal reviewed. If not effective, trial PO fluconazole reviewed. 3,4: Labs reviewed. Follow-up Disposition:  Return if symptoms worsen or fail to improve. reviewed medications and side effects in detail    Plan and evaluation (above) reviewed with pt at visit  Patient voiced understanding of plan and provided with time to ask/review questions. After Visit Summary (AVS) provided to pt after visit with additional instructions as needed/reviewed.

## 2017-04-06 NOTE — PATIENT INSTRUCTIONS
Learning About Living Yuniel Regalado  What is a living will? A living will is a legal form you use to write down the kind of care you want at the end of your life. It is used by the health professionals who will treat you if you aren't able to decide for yourself. If you put your wishes in writing, your loved ones and others will know what kind of care you want. They won't need to guess. This can ease your mind and be helpful to others. A living will is not the same as an estate or property will. An estate will explains what you want to happen with your money and property after you die. Is a living will a legal document? A living will is a legal document. Each state has its own laws about living gary. If you move to another state, make sure that your living will is legal in the state where you now live. Or you might use a universal form that has been approved by many states. This kind of form can sometimes be completed and stored online. Your electronic copy will then be available wherever you have a connection to the Internet. In most cases, doctors will respect your wishes even if you have a form from a different state. · You don't need an  to complete a living will. But legal advice can be helpful if your state's laws are unclear, your health history is complicated, or your family can't agree on what should be in your living will. · You can change your living will at any time. Some people find that their wishes about end-of-life care change as their health changes. · In addition to making a living will, think about completing a medical power of  form. This form lets you name the person you want to make end-of-life treatment decisions for you (your \"health care agent\") if you're not able to. Many hospitals and nursing homes will give you the forms you need to complete a living will and a medical power of .   · Your living will is used only if you can't make or communicate decisions for yourself anymore. If you become able to make decisions again, you can accept or refuse any treatment, no matter what you wrote in your living will. · Your state may offer an online registry. This is a place where you can store your living will online so the doctors and nurses who need to treat you can find it right away. What should you think about when creating a living will? Talk about your end-of-life wishes with your family members and your doctor. Let them know what you want. That way the people making decisions for you won't be surprised by your choices. Think about these questions as you make your living will:  · Do you know enough about life support methods that might be used? If not, talk to your doctor so you know what might be done if you can't breathe on your own, your heart stops, or you're unable to swallow. · What things would you still want to be able to do after you receive life-support methods? Would you want to be able to walk? To speak? To eat on your own? To live without the help of machines? · If you have a choice, where do you want to be cared for? In your home? At a hospital or nursing home? · Do you want certain Jew practices performed if you become very ill? · If you have a choice at the end of your life, where would you prefer to die? At home? In a hospital or nursing home? Somewhere else? · Would you prefer to be buried or cremated? · Do you want your organs to be donated after you die? What should you do with your living will? · Make sure that your family members and your health care agent have copies of your living will. · Give your doctor a copy of your living will to keep in your medical record. If you have more than one doctor, make sure that each one has a copy. · You may want to put a copy of your living will where it can be easily found. Where can you learn more? Go to http://florencia-maurice.info/.   Enter Z716 in the search box to learn more about \"Learning About Living Elizabeth. \"  Current as of: February 24, 2016  Content Version: 11.2  © 8779-1519 [employer] Care instructions adapted under license by [employer] (which disclaims liability or warranty for this information). If you have questions about a medical condition or this instruction, always ask your healthcare professional. [address] any warranty or liability for your use of this information. Advance Directives: Care Instructions  Your Care Instructions  An advance directive is a legal way to state your wishes at the end of your life. It tells your family and your doctor what to do if you can no longer say what you want. There are two main types of advance directives. You can change them any time that your wishes change. · A living will tells your family and your doctor your wishes about life support and other treatment. · A durable power of  for health care lets you name a person to make treatment decisions for you when you can't speak for yourself. This person is called a health care agent. If you do not have an advance directive, decisions about your medical care may be made by a doctor or a  who doesn't know you. It may help to think of an advance directive as a gift to the people who care for you. If you have one, they won't have to make tough decisions by themselves. Follow-up care is a key part of your treatment and safety. Be sure to make and go to all appointments, and call your doctor if you are having problems. It's also a good idea to know your test results and keep a list of the medicines you take. How can you care for yourself at home? · Discuss your wishes with your loved ones and your doctor. This way, there are no surprises. · Many states have a unique form. Or you might use a universal form that has been approved by many states. This kind of form can sometimes be completed and stored online.  Your electronic copy will then be available wherever you have a connection to the Internet. In most cases, doctors will respect your wishes even if you have a form from a different state. · You don't need a  to do an advance directive. But you may want to get legal advice. · Think about these questions when you prepare an advance directive:  ¨ Who do you want to make decisions about your medical care if you are not able to? Many people choose a family member or close friend. ¨ Do you know enough about life support methods that might be used? If not, talk to your doctor so you understand. ¨ What are you most afraid of that might happen? You might be afraid of having pain, losing your independence, or being kept alive by machines. ¨ Where would you prefer to die? Choices include your home, a hospital, or a nursing home. ¨ Would you like to have information about hospice care to support you and your family? ¨ Do you want to donate organs when you die? ¨ Do you want certain Buddhist practices performed before you die? If so, put your wishes in the advance directive. · Read your advance directive every year, and make changes as needed. When should you call for help? Be sure to contact your doctor if you have any questions. Where can you learn more? Go to http://florencia-maurice.info/. Enter R264 in the search box to learn more about \"Advance Directives: Care Instructions. \"  Current as of: November 17, 2016  Content Version: 11.2  © 4378-0202 [employer]. Care instructions adapted under license by [employer] (which disclaims liability or warranty for this information). If you have questions about a medical condition or this instruction, always ask your healthcare professional. [address] any warranty or liability for your use of this information.

## 2017-04-06 NOTE — PROGRESS NOTES
RM # [id number]    Chief Complaint   Patient presents with    Rash     face, eyes    Nodule     right forearm, patient states it has been present for more than one year, now having some discomfort     1. Have you been to the ER, urgent care clinic since your last visit? Hospitalized since your last visit? No    2. Have you seen or consulted any other health care providers outside of the [address] [place] [address] since your last visit? Include any pap smears or colon screening.  No    Patient does not have a living will, would like information, will include in AVS.

## 2017-04-07 LAB
25(OH)D3+25(OH)D2 SERPL-MCNC: 33.6 NG/ML (ref 30–100)
BASOPHILS # BLD AUTO: 0 X10E3/UL (ref 0–0.2)
BASOPHILS NFR BLD AUTO: 0 %
EOSINOPHIL # BLD AUTO: 0.1 X10E3/UL (ref 0–0.4)
EOSINOPHIL NFR BLD AUTO: 2 %
ERYTHROCYTE [DISTWIDTH] IN BLOOD BY AUTOMATED COUNT: 15.6 % (ref 12.3–15.4)
FERRITIN SERPL-MCNC: 12 NG/ML (ref 15–150)
HCT VFR BLD AUTO: 37.7 % (ref 34–46.6)
HGB BLD-MCNC: 12.6 G/DL (ref 11.1–15.9)
IMM GRANULOCYTES # BLD: 0 X10E3/UL (ref 0–0.1)
IMM GRANULOCYTES NFR BLD: 0 %
IRON SATN MFR SERPL: 37 % (ref 15–55)
IRON SERPL-MCNC: 143 UG/DL (ref 27–159)
LYMPHOCYTES # BLD AUTO: 2.3 X10E3/UL (ref 0.7–3.1)
LYMPHOCYTES NFR BLD AUTO: 39 %
MCH RBC QN AUTO: 26.6 PG (ref 26.6–33)
MCHC RBC AUTO-ENTMCNC: 33.4 G/DL (ref 31.5–35.7)
MCV RBC AUTO: 80 FL (ref 79–97)
MONOCYTES # BLD AUTO: 0.8 X10E3/UL (ref 0.1–0.9)
MONOCYTES NFR BLD AUTO: 13 %
NEUTROPHILS # BLD AUTO: 2.8 X10E3/UL (ref 1.4–7)
NEUTROPHILS NFR BLD AUTO: 46 %
PLATELET # BLD AUTO: 188 X10E3/UL (ref 150–379)
RBC # BLD AUTO: 4.74 X10E6/UL (ref 3.77–5.28)
TIBC SERPL-MCNC: 383 UG/DL (ref 250–450)
UIBC SERPL-MCNC: 240 UG/DL (ref 131–425)
WBC # BLD AUTO: 6 X10E3/UL (ref 3.4–10.8)

## 2017-04-14 ENCOUNTER — HOSPITAL ENCOUNTER (OUTPATIENT)
Dept: MAMMOGRAPHY | Age: 52
Discharge: HOME | End: 2017-04-14
Attending: FAMILY MEDICINE
Payer: COMMERCIAL

## 2017-04-14 DIAGNOSIS — Z12.31 VISIT FOR SCREENING MAMMOGRAM: ICD-10-CM

## 2017-04-14 PROCEDURE — 77067 SCR MAMMO BI INCL CAD: CPT

## 2017-04-24 ENCOUNTER — PATIENT OUTREACH (OUTPATIENT)
Dept: OTHER | Age: 52
End: 2017-04-24

## 2017-04-24 NOTE — PROGRESS NOTES
Patient indicated interest in [address] [place] [address] services in a previous call. Telephone outreach attempted. Left discreet voicemail with this CM confidential contact information. Will send a new lost contact letter via mail.

## 2017-05-26 ENCOUNTER — PATIENT OUTREACH (OUTPATIENT)
Dept: OTHER | Age: 52
End: 2017-05-26

## 2017-05-26 NOTE — PROGRESS NOTES
Resolving current episode for case management due to patient lost to follow up. Patient has not been reached after repeated calls and letters. Final call made today to attempt to contact and discreet message left on voicemail. Letter sent to patient notifying completion of services due to unable to reach. This writer's contact information and information regarding program services included in materials sent. Will remain available should patient request re-initiation of case management or transitions of care services.

## 2017-06-05 ENCOUNTER — APPOINTMENT (OUTPATIENT)
Dept: CT IMAGING | Age: 52
End: 2017-06-05
Attending: PHYSICIAN ASSISTANT
Payer: COMMERCIAL

## 2017-06-05 ENCOUNTER — HOSPITAL ENCOUNTER (EMERGENCY)
Age: 52
Discharge: HOME | End: 2017-06-05
Attending: EMERGENCY MEDICINE
Payer: COMMERCIAL

## 2017-06-05 VITALS
HEIGHT: 64 IN | TEMPERATURE: 97.6 F | RESPIRATION RATE: 16 BRPM | OXYGEN SATURATION: 95 % | BODY MASS INDEX: 37.56 KG/M2 | HEART RATE: 66 BPM | SYSTOLIC BLOOD PRESSURE: 125 MMHG | DIASTOLIC BLOOD PRESSURE: 69 MMHG | WEIGHT: 220 LBS

## 2017-06-05 DIAGNOSIS — N20.0 KIDNEY STONE: Primary | ICD-10-CM

## 2017-06-05 LAB
ALBUMIN SERPL BCP-MCNC: 3.5 G/DL (ref 3.5–5)
ALBUMIN/GLOB SERPL: 0.9 {RATIO} (ref 1.1–2.2)
ALP SERPL-CCNC: 110 U/L (ref 45–117)
ALT SERPL-CCNC: 30 U/L (ref 12–78)
ANION GAP BLD CALC-SCNC: 7 MMOL/L (ref 5–15)
APPEARANCE UR: ABNORMAL
AST SERPL W P-5'-P-CCNC: 23 U/L (ref 15–37)
BACTERIA URNS QL MICRO: ABNORMAL /HPF
BASOPHILS # BLD AUTO: 0 K/UL (ref 0–0.1)
BASOPHILS # BLD: 0 % (ref 0–1)
BILIRUB SERPL-MCNC: 0.2 MG/DL (ref 0.2–1)
BILIRUB UR QL: NEGATIVE
BUN SERPL-MCNC: 23 MG/DL (ref 6–20)
BUN/CREAT SERPL: 26 (ref 12–20)
CALCIUM SERPL-MCNC: 9 MG/DL (ref 8.5–10.1)
CAOX CRY URNS QL MICRO: ABNORMAL
CHLORIDE SERPL-SCNC: 107 MMOL/L (ref 97–108)
CO2 SERPL-SCNC: 28 MMOL/L (ref 21–32)
COLOR UR: ABNORMAL
CREAT SERPL-MCNC: 0.9 MG/DL (ref 0.55–1.02)
EOSINOPHIL # BLD: 0.1 K/UL (ref 0–0.4)
EOSINOPHIL NFR BLD: 1 % (ref 0–7)
EPITH CASTS URNS QL MICRO: ABNORMAL /LPF
ERYTHROCYTE [DISTWIDTH] IN BLOOD BY AUTOMATED COUNT: 15 % (ref 11.5–14.5)
GLOBULIN SER CALC-MCNC: 4 G/DL (ref 2–4)
GLUCOSE SERPL-MCNC: 105 MG/DL (ref 65–100)
GLUCOSE UR STRIP.AUTO-MCNC: NEGATIVE MG/DL
HCT VFR BLD AUTO: 36.1 % (ref 35–47)
HGB BLD-MCNC: 11.3 G/DL (ref 11.5–16)
HGB UR QL STRIP: ABNORMAL
KETONES UR QL STRIP.AUTO: NEGATIVE MG/DL
LEUKOCYTE ESTERASE UR QL STRIP.AUTO: ABNORMAL
LIPASE SERPL-CCNC: 199 U/L (ref 73–393)
LYMPHOCYTES # BLD AUTO: 36 % (ref 12–49)
LYMPHOCYTES # BLD: 2.1 K/UL (ref 0.8–3.5)
MCH RBC QN AUTO: 25.6 PG (ref 26–34)
MCHC RBC AUTO-ENTMCNC: 31.3 G/DL (ref 30–36.5)
MCV RBC AUTO: 81.7 FL (ref 80–99)
MONOCYTES # BLD: 0.6 K/UL (ref 0–1)
MONOCYTES NFR BLD AUTO: 10 % (ref 5–13)
NEUTS SEG # BLD: 3.2 K/UL (ref 1.8–8)
NEUTS SEG NFR BLD AUTO: 53 % (ref 32–75)
NITRITE UR QL STRIP.AUTO: NEGATIVE
PH UR STRIP: 6 [PH] (ref 5–8)
PLATELET # BLD AUTO: 186 K/UL (ref 150–400)
POTASSIUM SERPL-SCNC: 3.9 MMOL/L (ref 3.5–5.1)
PROT SERPL-MCNC: 7.5 G/DL (ref 6.4–8.2)
PROT UR STRIP-MCNC: NEGATIVE MG/DL
RBC # BLD AUTO: 4.42 M/UL (ref 3.8–5.2)
RBC #/AREA URNS HPF: ABNORMAL /HPF (ref 0–5)
SODIUM SERPL-SCNC: 142 MMOL/L (ref 136–145)
SP GR UR REFRACTOMETRY: 1.02 (ref 1–1.03)
UA: UC IF INDICATED,UAUC: ABNORMAL
UROBILINOGEN UR QL STRIP.AUTO: 0.2 EU/DL (ref 0.2–1)
WBC # BLD AUTO: 6 K/UL (ref 3.6–11)
WBC URNS QL MICRO: ABNORMAL /HPF (ref 0–4)

## 2017-06-05 PROCEDURE — 74011250636 HC RX REV CODE- 250/636: Performed by: EMERGENCY MEDICINE

## 2017-06-05 PROCEDURE — 96361 HYDRATE IV INFUSION ADD-ON: CPT

## 2017-06-05 PROCEDURE — 96374 THER/PROPH/DIAG INJ IV PUSH: CPT

## 2017-06-05 PROCEDURE — 83690 ASSAY OF LIPASE: CPT | Performed by: PHYSICIAN ASSISTANT

## 2017-06-05 PROCEDURE — 87086 URINE CULTURE/COLONY COUNT: CPT | Performed by: EMERGENCY MEDICINE

## 2017-06-05 PROCEDURE — 36415 COLL VENOUS BLD VENIPUNCTURE: CPT | Performed by: PHYSICIAN ASSISTANT

## 2017-06-05 PROCEDURE — 99284 EMERGENCY DEPT VISIT MOD MDM: CPT

## 2017-06-05 PROCEDURE — 74011250636 HC RX REV CODE- 250/636: Performed by: PHYSICIAN ASSISTANT

## 2017-06-05 PROCEDURE — 74176 CT ABD & PELVIS W/O CONTRAST: CPT

## 2017-06-05 PROCEDURE — 81001 URINALYSIS AUTO W/SCOPE: CPT | Performed by: EMERGENCY MEDICINE

## 2017-06-05 PROCEDURE — 80053 COMPREHEN METABOLIC PANEL: CPT | Performed by: EMERGENCY MEDICINE

## 2017-06-05 PROCEDURE — 96375 TX/PRO/DX INJ NEW DRUG ADDON: CPT

## 2017-06-05 PROCEDURE — 85025 COMPLETE CBC W/AUTO DIFF WBC: CPT | Performed by: EMERGENCY MEDICINE

## 2017-06-05 PROCEDURE — 74011250637 HC RX REV CODE- 250/637: Performed by: PHYSICIAN ASSISTANT

## 2017-06-05 RX ORDER — TAMSULOSIN HYDROCHLORIDE 0.4 MG/1
0.4 CAPSULE ORAL
Status: COMPLETED | OUTPATIENT
Start: 2017-06-05 | End: 2017-06-05

## 2017-06-05 RX ORDER — ONDANSETRON 2 MG/ML
4 INJECTION INTRAMUSCULAR; INTRAVENOUS
Status: COMPLETED | OUTPATIENT
Start: 2017-06-05 | End: 2017-06-05

## 2017-06-05 RX ORDER — HYDROCODONE BITARTRATE AND ACETAMINOPHEN 5; 325 MG/1; MG/1
1 TABLET ORAL
Qty: 20 TAB | Refills: 0 | Status: SHIPPED | OUTPATIENT
Start: 2017-06-05 | End: 2017-08-14

## 2017-06-05 RX ORDER — CEPHALEXIN 500 MG/1
500 CAPSULE ORAL 3 TIMES DAILY
Qty: 30 CAP | Refills: 0 | Status: SHIPPED | OUTPATIENT
Start: 2017-06-05 | End: 2017-06-15

## 2017-06-05 RX ORDER — TAMSULOSIN HYDROCHLORIDE 0.4 MG/1
0.4 CAPSULE ORAL DAILY
Qty: 7 CAP | Refills: 0 | Status: SHIPPED | OUTPATIENT
Start: 2017-06-05 | End: 2017-06-12

## 2017-06-05 RX ORDER — ONDANSETRON 4 MG/1
4 TABLET, ORALLY DISINTEGRATING ORAL
Qty: 12 TAB | Refills: 0 | Status: SHIPPED | OUTPATIENT
Start: 2017-06-05 | End: 2017-06-15

## 2017-06-05 RX ORDER — HYDROMORPHONE HYDROCHLORIDE 1 MG/ML
1 INJECTION, SOLUTION INTRAMUSCULAR; INTRAVENOUS; SUBCUTANEOUS ONCE
Status: COMPLETED | OUTPATIENT
Start: 2017-06-05 | End: 2017-06-05

## 2017-06-05 RX ORDER — KETOROLAC TROMETHAMINE 30 MG/ML
30 INJECTION, SOLUTION INTRAMUSCULAR; INTRAVENOUS
Status: COMPLETED | OUTPATIENT
Start: 2017-06-05 | End: 2017-06-05

## 2017-06-05 RX ORDER — NAPROXEN 500 MG/1
500 TABLET ORAL
Qty: 20 TAB | Refills: 0 | Status: SHIPPED | OUTPATIENT
Start: 2017-06-05 | End: 2017-08-11

## 2017-06-05 RX ADMIN — ONDANSETRON 4 MG: 2 INJECTION INTRAMUSCULAR; INTRAVENOUS at 05:02

## 2017-06-05 RX ADMIN — TAMSULOSIN HYDROCHLORIDE 0.4 MG: 0.4 CAPSULE ORAL at 06:22

## 2017-06-05 RX ADMIN — HYDROMORPHONE HYDROCHLORIDE 1 MG: 1 INJECTION, SOLUTION INTRAMUSCULAR; INTRAVENOUS; SUBCUTANEOUS at 06:22

## 2017-06-05 RX ADMIN — SODIUM CHLORIDE 1000 ML: 900 INJECTION, SOLUTION INTRAVENOUS at 05:02

## 2017-06-05 RX ADMIN — KETOROLAC TROMETHAMINE 30 MG: 30 INJECTION, SOLUTION INTRAMUSCULAR at 05:02

## 2017-06-05 NOTE — ED TRIAGE NOTES
Patient presents to the emergency department reporting left sided flank pain. Patient awoke from her sleep with pain. Patient denies urinary symptoms.

## 2017-06-05 NOTE — ED NOTES
Pt ambulatory out of ED with discharge instructions and prescriptions in hand given by Dr. Sveta Rice; pt verbalized understanding of discharge paperwork and time allotted for questions. VSS. Pt alert and oriented.

## 2017-06-05 NOTE — LETTER
[address] 
[address] [place] [address] [facility] [id number] 
[phone] Work/School Note Date: 6/5/2017 To Whom It May concern: 
 
Calvin Garcia was seen and treated today in the emergency room by the following provider(s): 
Attending Provider: Kain Hearn MD 
Physician Assistant: KLELY Dozier. Calvin Garcia may return to work on Wednesday; sooner if symptoms improve. Sincerely, KELLY Dozier

## 2017-06-05 NOTE — DISCHARGE INSTRUCTIONS
Kidney Stone: Care Instructions  Your Care Instructions    Kidney stones are formed when salts, minerals, and other substances normally found in the urine clump together. They can be as small as grains of sand or, rarely, as large as golf balls. While the stone is traveling through the ureter, which is the tube that carries urine from the kidney to the bladder, you will probably feel pain. The pain may be mild or very severe. You may also have some blood in your urine. As soon as the stone reaches the bladder, any intense pain should go away. If a stone is too large to pass on its own, you may need a medical procedure to help you pass the stone. The doctor has checked you carefully, but problems can develop later. If you notice any problems or new symptoms, get medical treatment right away. Follow-up care is a key part of your treatment and safety. Be sure to make and go to all appointments, and call your doctor if you are having problems. It's also a good idea to know your test results and keep a list of the medicines you take. How can you care for yourself at home? · Drink plenty of fluids, enough so that your urine is light yellow or clear like water. If you have kidney, heart, or liver disease and have to limit fluids, talk with your doctor before you increase the amount of fluids you drink. · Take pain medicines exactly as directed. Call your doctor if you think you are having a problem with your medicine. ¨ If the doctor gave you a prescription medicine for pain, take it as prescribed. ¨ If you are not taking a prescription pain medicine, ask your doctor if you can take an over-the-counter medicine. Read and follow all instructions on the label. · Your doctor may ask you to strain your urine so that you can collect your kidney stone when it passes. You can use a kitchen strainer or a tea strainer to catch the stone. Store it in a plastic bag until you see your doctor again.   Preventing future kidney stones  Some changes in your diet may help prevent kidney stones. Depending on the cause of your stones, your doctor may recommend that you:  · Drink plenty of fluids, enough so that your urine is light yellow or clear like water. If you have kidney, heart, or liver disease and have to limit fluids, talk with your doctor before you increase the amount of fluids you drink. · Limit coffee, tea, and alcohol. Also avoid grapefruit juice. · Do not take more than the recommended daily dose of vitamins C and D.  · Avoid antacids such as Gaviscon, Maalox, Mylanta, or Tums. · Limit the amount of salt (sodium) in your diet. · Eat a balanced diet that is not too high in protein. · Limit foods that are high in a substance called oxalate, which can cause kidney stones. These foods include dark green vegetables, rhubarb, chocolate, wheat bran, nuts, cranberries, and beans. When should you call for help? Call your doctor now or seek immediate medical care if:  · You cannot keep down fluids. · Your pain gets worse. · You have a fever or chills. · You have new or worse pain in your back just below your rib cage (the flank area). · You have new or more blood in your urine. Watch closely for changes in your health, and be sure to contact your doctor if:  · You do not get better as expected. Where can you learn more? Go to http://florencia-maurice.info/. Enter B737 in the search box to learn more about \"Kidney Stone: Care Instructions. \"  Current as of: November 20, 2015  Content Version: 11.2  © 0066-1577 [employer]. Care instructions adapted under license by [employer] (which disclaims liability or warranty for this information). If you have questions about a medical condition or this instruction, always ask your healthcare professional. [address] any warranty or liability for your use of this information.          We hope that we have addressed all of your medical concerns. The examination and treatment you received in the Emergency Department were for an emergent problem and were not intended as complete care. It is important that you follow up with your healthcare provider(s) for ongoing care. If your symptoms worsen or do not improve as expected, and you are unable to reach your usual health care provider(s), you should return to the Emergency Department. Today's healthcare is undergoing tremendous change, and patient satisfaction surveys are one of the many tools to assess the quality of medical care. You may receive a survey from the [employer] regarding your experience in the Emergency Department. I hope that your experience has been completely positive, particularly the medical care that I provided. As such, please participate in the survey; anything less than excellent does not meet my expectations or intentions. [facility] and [address] [place] [address] participate in nationally recognized quality of care measures. If your blood pressure is greater than 120/80, as reported below, we urge that you seek medical care to address the potential of high blood pressure, commonly known as hypertension. Hypertension can be hereditary or can be caused by certain medical conditions, pain, stress, or \"white coat syndrome. \"       Please make an appointment with your health care provider(s) for follow up of your Emergency Department visit. VITALS:   Patient Vitals for the past 8 hrs:   Temp Pulse Resp BP SpO2   06/05/17 0419 97.7 °F (36.5 °C) 63 18 (!) 152/92 98 %              We hope that we have addressed all of your medical concerns. The examination and treatment you received in the Emergency Department were for an emergent problem and were not intended as complete care. It is important that you follow up with your healthcare provider(s) for ongoing care.  If your symptoms worsen or do not improve as expected, and you are unable to reach your usual health care provider(s), you should return to the Emergency Department. Today's healthcare is undergoing tremendous change, and patient satisfaction surveys are one of the many tools to assess the quality of medical care. You may receive a survey from the [employer] regarding your experience in the Emergency Department. I hope that your experience has been completely positive, particularly the medical care that I provided. As such, please participate in the survey; anything less than excellent does not meet my expectations or intentions. [facility] and [facility] participate in nationally recognized quality of care measures. If your blood pressure is greater than 120/80, as reported below, we urge that you seek medical care to address the potential of high blood pressure, commonly known as hypertension. Hypertension can be hereditary or can be caused by certain medical conditions, pain, stress, or \"white coat syndrome. \"       Please make an appointment with your health care provider(s) for follow up of your Emergency Department visit. VITALS:   Patient Vitals for the past 8 hrs:   Temp Pulse Resp BP SpO2   06/05/17 0419 97.7 °F (36.5 °C) 63 18 (!) 152/92 98 %          Thank you for allowing us to provide you with medical care today. We realize that you have many choices for your emergency care needs. Please choose us in the future for any continued health care needs.       Regards,           Nini Chu, [address] [place], [place] [address]y [address].   Office: [phone]            Recent Results (from the past 24 hour(s))   CBC WITH AUTOMATED DIFF    Collection Time: 06/05/17  4:52 AM   Result Value Ref Range    WBC 6.0 3.6 - 11.0 K/uL    RBC 4.42 3.80 - 5.20 M/uL    HGB 11.3 (L) 11.5 - 16.0 g/dL    HCT 36.1 35.0 - 47.0 %    MCV 81.7 80.0 - 99.0 FL    MCH 25.6 (L) 26.0 - 34.0 PG    MCHC 31.3 30.0 - 36.5 g/dL    RDW 15.0 (H) 11.5 - 14.5 %    PLATELET 559 033 - 212 K/uL    NEUTROPHILS 53 32 - 75 %    LYMPHOCYTES 36 12 - 49 %    MONOCYTES 10 5 - 13 %    EOSINOPHILS 1 0 - 7 %    BASOPHILS 0 0 - 1 %    ABS. NEUTROPHILS 3.2 1.8 - 8.0 K/UL    ABS. LYMPHOCYTES 2.1 0.8 - 3.5 K/UL    ABS. MONOCYTES 0.6 0.0 - 1.0 K/UL    ABS. EOSINOPHILS 0.1 0.0 - 0.4 K/UL    ABS. BASOPHILS 0.0 0.0 - 0.1 K/UL   METABOLIC PANEL, COMPREHENSIVE    Collection Time: 06/05/17  4:52 AM   Result Value Ref Range    Sodium 142 136 - 145 mmol/L    Potassium 3.9 3.5 - 5.1 mmol/L    Chloride 107 97 - 108 mmol/L    CO2 28 21 - 32 mmol/L    Anion gap 7 5 - 15 mmol/L    Glucose 105 (H) 65 - 100 mg/dL    BUN 23 (H) 6 - 20 MG/DL    Creatinine 0.90 0.55 - 1.02 MG/DL    BUN/Creatinine ratio 26 (H) 12 - 20      GFR est AA >60 >60 ml/min/1.73m2    GFR est non-AA >60 >60 ml/min/1.73m2    Calcium 9.0 8.5 - 10.1 MG/DL    Bilirubin, total 0.2 0.2 - 1.0 MG/DL    ALT (SGPT) 30 12 - 78 U/L    AST (SGOT) 23 15 - 37 U/L    Alk.  phosphatase 110 45 - 117 U/L    Protein, total 7.5 6.4 - 8.2 g/dL    Albumin 3.5 3.5 - 5.0 g/dL    Globulin 4.0 2.0 - 4.0 g/dL    A-G Ratio 0.9 (L) 1.1 - 2.2     URINALYSIS W/ REFLEX CULTURE    Collection Time: 06/05/17  4:52 AM   Result Value Ref Range    Color YELLOW/STRAW      Appearance CLOUDY (A) CLEAR      Specific gravity 1.025 1.003 - 1.030      pH (UA) 6.0 5.0 - 8.0      Protein NEGATIVE  NEG mg/dL    Glucose NEGATIVE  NEG mg/dL    Ketone NEGATIVE  NEG mg/dL    Bilirubin NEGATIVE  NEG      Blood MODERATE (A) NEG      Urobilinogen 0.2 0.2 - 1.0 EU/dL    Nitrites NEGATIVE  NEG      Leukocyte Esterase LARGE (A) NEG      WBC 5-10 0 - 4 /hpf    RBC 5-10 0 - 5 /hpf    Epithelial cells MODERATE (A) FEW /lpf    Bacteria 1+ (A) NEG /hpf    UA:UC IF INDICATED URINE CULTURE ORDERED (A) CNI      CA Oxalate crystals 2+ (A) NEG   LIPASE    Collection Time: 06/05/17  4:52 AM   Result Value Ref Range    Lipase 199 73 - 393 U/L       Ct Abd Pelv Wo Cont    Result Date: 6/5/2017  INDICATION: Left flank pain COMPARISON: None TECHNIQUE: Thin axial images were obtained through the abdomen and pelvis. Coronal and sagittal reconstructions were generated. Oral contrast was not administered. CT dose reduction was achieved through use of a standardized protocol tailored for this examination and automatic exposure control for dose modulation. Adaptive statistical iterative reconstruction (ASIR) was utilized. The absence of intravenous contrast material reduces the sensitivity for evaluation of the solid parenchymal organs of the abdomen. FINDINGS: LUNG BASES: Clear. INCIDENTALLY IMAGED HEART AND MEDIASTINUM: Unremarkable. LIVER: No mass or biliary dilatation. GALLBLADDER: Unremarkable. SPLEEN: No mass. PANCREAS: No mass or ductal dilatation. ADRENALS: 10 mm left adrenal adenoma. Right adrenal gland is not enlarged KIDNEYS/URETERS: Small bilateral nonobstructing renal stones. 3 mm calcification in the left retroperitoneum appears to be within the proximal left ureter however there is no hydronephrosis. No right-sided hydronephrosis STOMACH: Unremarkable. SMALL BOWEL: Patient is post gastric bypass COLON: No dilatation or wall thickening. APPENDIX: Unremarkable. PERITONEUM: No ascites or pneumoperitoneum. RETROPERITONEUM: No lymphadenopathy or aortic aneurysm. REPRODUCTIVE ORGANS: Uterus is not enlarged URINARY BLADDER: Bladder is decompressed BONES: No destructive bone lesion. ADDITIONAL COMMENTS: N/A     IMPRESSION: 1. Small bilateral nonobstructing renal stones.  Probable 3 mm stone in the proximal left ureter

## 2017-06-06 ENCOUNTER — PATIENT OUTREACH (OUTPATIENT)
Dept: OTHER | Age: 52
End: 2017-06-06

## 2017-06-06 LAB
BACTERIA SPEC CULT: NORMAL
CC UR VC: NORMAL
SERVICE CMNT-IMP: NORMAL

## 2017-06-06 NOTE — PROGRESS NOTES
Patient on report as with discharge from ED visit 06/05 for kidney stone with probable UTI. Initial attempt to contact patient for transitions of care. Left discreet message on voicemail with this CM contact information. Will attempt to contact again. ED Prescriptions   Medication Sig Dispense Start Date End Date Auth. Provider   HYDROcodone-acetaminophen (NORCO) 5-325 mg per tablet Take 1 Tab by mouth every four (4) hours as needed for Pain. Max Daily Amount: 6 Tabs. 20 Tab 6/5/2017  KELLY Jasmine   naproxen (NAPROSYN) 500 mg tablet Take 1 Tab by mouth every twelve (12) hours as needed for Pain. 20 Tab 6/5/2017  KELLY Jasmine   tamsulosin (FLOMAX) 0.4 mg capsule Take 1 Cap by mouth daily for 7 doses. 7 Cap 6/5/2017 6/12/2017 KELLY Matta   ondansetron (ZOFRAN ODT) 4 mg disintegrating tablet Take 1 Tab by mouth every eight (8) hours as needed for Nausea for up to 10 days. 12 Tab 6/5/2017 6/15/2017 KELLY Jasmine   cephALEXin (KEFLEX) 500 mg capsule Take 1 Cap by mouth three (3) times daily for 10 days.  30 Cap 6/5/2017 6/15/2017 Rebecca Araujo MD   Follow-up Information   Follow up With Details [facility], [address] [place] [address] [address]   [place] [phone]

## 2017-06-07 ENCOUNTER — PATIENT OUTREACH (OUTPATIENT)
Dept: OTHER | Age: 52
End: 2017-06-07

## 2017-06-07 NOTE — PROGRESS NOTES
Patient on report as with discharge from ED visit 06/05 for kidney stone/  UTI. Second attempt to contact patient for transitions of care. Left discreet message on voicemail with this CM contact information. Unable to reach letter sent via Mail  Will follow for one month for transitions of care needs.

## 2017-06-07 NOTE — LETTER
6/7/2017 1:36 PM 
 
Ms. [facility] [address] [facility] [address] Dear Ms. Padma Ross, My name is Andrew Boggs , Employee Care Manager for [employer], and I have been trying to reach you. The Employee Care  is a free-of-charge, confidential service provided to our employees and their family members covered by the [employer]. Part of my job is to follow up with members who have recently been in the hospital or emergency room, to help them coordinate their care and answer questions they may have about their visit. I am able to provide assistance with medication questions, scheduling needed follow-up appointments, and arranging services like home health or home medical equipment. I can also provide education regarding your hospital or ER visit as well as your medical conditions. As healthcare providers, we know that patients do better when they have close follow up with a primary care provider (PCP), especially after a hospital or emergency department visit. If you do not have a PCP, I can help you find one that is convenient to you and covered by your insurance. I can also help you understand any after visit instructions, such as what symptoms to watch out for, or any new or changed medications. Remember that you can access your After Visit Summary by logging into your [employer] account. If you do not have a [employer] account, I can help you request access. Our program is designed to provide you with the opportunity to have a [employer] care manager partner with you for your healthcare needs. Please contact me at the below number if I can provide you with assistance for any of the above services.  
 
 
Sincerely, 
Andrew Boggs RN, [facility], [address] [address].  
Phone:  [phone]     Fax:  [phone]    [email or web address]

## 2017-07-07 ENCOUNTER — PATIENT OUTREACH (OUTPATIENT)
Dept: OTHER | Age: 52
End: 2017-07-07

## 2017-07-07 NOTE — PROGRESS NOTES
Resolving current episode (Transitions of care complete). No further ED/UC or hospital admissions within 30 days post discharge. Multiple attempts to reach patient for CM services with UTR letter sent. No office visits available for review on [facility]. No outreach from patient to [address] [place] [address].

## 2017-08-03 ENCOUNTER — HOSPITAL ENCOUNTER (EMERGENCY)
Age: 52
Discharge: HOME | End: 2017-08-03
Attending: EMERGENCY MEDICINE
Payer: COMMERCIAL

## 2017-08-03 ENCOUNTER — APPOINTMENT (OUTPATIENT)
Dept: ULTRASOUND IMAGING | Age: 52
End: 2017-08-03
Attending: EMERGENCY MEDICINE
Payer: COMMERCIAL

## 2017-08-03 VITALS
BODY MASS INDEX: 38.76 KG/M2 | RESPIRATION RATE: 20 BRPM | OXYGEN SATURATION: 98 % | HEIGHT: 64 IN | HEART RATE: 75 BPM | WEIGHT: 227 LBS | TEMPERATURE: 98.6 F | SYSTOLIC BLOOD PRESSURE: 155 MMHG | DIASTOLIC BLOOD PRESSURE: 72 MMHG

## 2017-08-03 DIAGNOSIS — N20.0 KIDNEY STONE: Primary | ICD-10-CM

## 2017-08-03 LAB
ALBUMIN SERPL BCP-MCNC: 3.8 G/DL (ref 3.5–5)
ALBUMIN/GLOB SERPL: 1 {RATIO} (ref 1.1–2.2)
ALP SERPL-CCNC: 96 U/L (ref 45–117)
ALT SERPL-CCNC: 29 U/L (ref 12–78)
ANION GAP BLD CALC-SCNC: 5 MMOL/L (ref 5–15)
APPEARANCE UR: ABNORMAL
AST SERPL W P-5'-P-CCNC: 24 U/L (ref 15–37)
BACTERIA URNS QL MICRO: ABNORMAL /HPF
BASOPHILS # BLD AUTO: 0 K/UL (ref 0–0.1)
BASOPHILS # BLD: 0 % (ref 0–1)
BILIRUB SERPL-MCNC: 0.4 MG/DL (ref 0.2–1)
BILIRUB UR QL: NEGATIVE
BUN SERPL-MCNC: 23 MG/DL (ref 6–20)
BUN/CREAT SERPL: 15 (ref 12–20)
CALCIUM SERPL-MCNC: 8.7 MG/DL (ref 8.5–10.1)
CHLORIDE SERPL-SCNC: 104 MMOL/L (ref 97–108)
CO2 SERPL-SCNC: 31 MMOL/L (ref 21–32)
COLOR UR: ABNORMAL
CREAT SERPL-MCNC: 1.57 MG/DL (ref 0.55–1.02)
EOSINOPHIL # BLD: 0.1 K/UL (ref 0–0.4)
EOSINOPHIL NFR BLD: 2 % (ref 0–7)
EPITH CASTS URNS QL MICRO: ABNORMAL /LPF
ERYTHROCYTE [DISTWIDTH] IN BLOOD BY AUTOMATED COUNT: 16.3 % (ref 11.5–14.5)
GLOBULIN SER CALC-MCNC: 3.9 G/DL (ref 2–4)
GLUCOSE SERPL-MCNC: 81 MG/DL (ref 65–100)
GLUCOSE UR STRIP.AUTO-MCNC: NEGATIVE MG/DL
HCT VFR BLD AUTO: 37.2 % (ref 35–47)
HGB BLD-MCNC: 11.8 G/DL (ref 11.5–16)
HGB UR QL STRIP: ABNORMAL
KETONES UR QL STRIP.AUTO: NEGATIVE MG/DL
LEUKOCYTE ESTERASE UR QL STRIP.AUTO: ABNORMAL
LYMPHOCYTES # BLD AUTO: 34 % (ref 12–49)
LYMPHOCYTES # BLD: 2.5 K/UL (ref 0.8–3.5)
MCH RBC QN AUTO: 26 PG (ref 26–34)
MCHC RBC AUTO-ENTMCNC: 31.7 G/DL (ref 30–36.5)
MCV RBC AUTO: 81.9 FL (ref 80–99)
MONOCYTES # BLD: 1 K/UL (ref 0–1)
MONOCYTES NFR BLD AUTO: 14 % (ref 5–13)
NEUTS SEG # BLD: 3.8 K/UL (ref 1.8–8)
NEUTS SEG NFR BLD AUTO: 50 % (ref 32–75)
NITRITE UR QL STRIP.AUTO: NEGATIVE
PH UR STRIP: 5.5 [PH] (ref 5–8)
PLATELET # BLD AUTO: 133 K/UL (ref 150–400)
POTASSIUM SERPL-SCNC: 4.2 MMOL/L (ref 3.5–5.1)
PROT SERPL-MCNC: 7.7 G/DL (ref 6.4–8.2)
PROT UR STRIP-MCNC: NEGATIVE MG/DL
RBC # BLD AUTO: 4.54 M/UL (ref 3.8–5.2)
RBC #/AREA URNS HPF: ABNORMAL /HPF (ref 0–5)
SODIUM SERPL-SCNC: 140 MMOL/L (ref 136–145)
SP GR UR REFRACTOMETRY: 1 (ref 1–1.03)
UROBILINOGEN UR QL STRIP.AUTO: 0.2 EU/DL (ref 0.2–1)
WBC # BLD AUTO: 7.4 K/UL (ref 3.6–11)
WBC URNS QL MICRO: ABNORMAL /HPF (ref 0–4)

## 2017-08-03 PROCEDURE — 76700 US EXAM ABDOM COMPLETE: CPT

## 2017-08-03 PROCEDURE — 85025 COMPLETE CBC W/AUTO DIFF WBC: CPT | Performed by: EMERGENCY MEDICINE

## 2017-08-03 PROCEDURE — 81001 URINALYSIS AUTO W/SCOPE: CPT | Performed by: EMERGENCY MEDICINE

## 2017-08-03 PROCEDURE — 74011250637 HC RX REV CODE- 250/637: Performed by: EMERGENCY MEDICINE

## 2017-08-03 PROCEDURE — 74011250636 HC RX REV CODE- 250/636: Performed by: EMERGENCY MEDICINE

## 2017-08-03 PROCEDURE — 96374 THER/PROPH/DIAG INJ IV PUSH: CPT

## 2017-08-03 PROCEDURE — 80053 COMPREHEN METABOLIC PANEL: CPT | Performed by: EMERGENCY MEDICINE

## 2017-08-03 PROCEDURE — 36415 COLL VENOUS BLD VENIPUNCTURE: CPT | Performed by: EMERGENCY MEDICINE

## 2017-08-03 PROCEDURE — 96361 HYDRATE IV INFUSION ADD-ON: CPT

## 2017-08-03 PROCEDURE — 99284 EMERGENCY DEPT VISIT MOD MDM: CPT

## 2017-08-03 PROCEDURE — 96375 TX/PRO/DX INJ NEW DRUG ADDON: CPT

## 2017-08-03 RX ORDER — CEPHALEXIN 500 MG/1
500 CAPSULE ORAL
Status: COMPLETED | OUTPATIENT
Start: 2017-08-03 | End: 2017-08-03

## 2017-08-03 RX ORDER — KETOROLAC TROMETHAMINE 30 MG/ML
30 INJECTION, SOLUTION INTRAMUSCULAR; INTRAVENOUS
Status: COMPLETED | OUTPATIENT
Start: 2017-08-03 | End: 2017-08-03

## 2017-08-03 RX ORDER — ONDANSETRON 2 MG/ML
8 INJECTION INTRAMUSCULAR; INTRAVENOUS
Status: COMPLETED | OUTPATIENT
Start: 2017-08-03 | End: 2017-08-03

## 2017-08-03 RX ORDER — TAMSULOSIN HYDROCHLORIDE 0.4 MG/1
0.4 CAPSULE ORAL DAILY
Qty: 12 CAP | Refills: 0 | Status: SHIPPED | OUTPATIENT
Start: 2017-08-03 | End: 2019-11-26

## 2017-08-03 RX ORDER — CEPHALEXIN 500 MG/1
500 CAPSULE ORAL 4 TIMES DAILY
Qty: 28 CAP | Refills: 0 | Status: SHIPPED | OUTPATIENT
Start: 2017-08-03 | End: 2017-08-11

## 2017-08-03 RX ORDER — KETOROLAC TROMETHAMINE 10 MG/1
10 TABLET, FILM COATED ORAL
Qty: 20 TAB | Refills: 0 | Status: SHIPPED | OUTPATIENT
Start: 2017-08-03 | End: 2019-11-26

## 2017-08-03 RX ORDER — HYDROCODONE BITARTRATE AND ACETAMINOPHEN 5; 325 MG/1; MG/1
1 TABLET ORAL
Qty: 20 TAB | Refills: 0 | Status: SHIPPED | OUTPATIENT
Start: 2017-08-03 | End: 2017-08-11

## 2017-08-03 RX ADMIN — ONDANSETRON 8 MG: 2 INJECTION INTRAMUSCULAR; INTRAVENOUS at 19:09

## 2017-08-03 RX ADMIN — CEPHALEXIN 500 MG: 500 CAPSULE ORAL at 21:28

## 2017-08-03 RX ADMIN — SODIUM CHLORIDE 1000 ML: 900 INJECTION, SOLUTION INTRAVENOUS at 19:09

## 2017-08-03 RX ADMIN — KETOROLAC TROMETHAMINE 30 MG: 30 INJECTION, SOLUTION INTRAMUSCULAR at 19:09

## 2017-08-03 NOTE — LETTER
[address] 
[address] [place], [place] [address] [address] 
[phone] Work/School Note Date: 8/3/2017 To Whom It May concern: 
 
Sean Rouse was seen and treated today in the emergency room by the following provider(s): 
Attending Provider: Jus Barron MD.   
 
Sean Rouse Return to work 8/6/17 Sincerely, Jus Barron MD

## 2017-08-03 NOTE — ED PROVIDER NOTES
HPI Comments: 46 y.o. female with past medical history significant for kidney stones who presents with chief complaint of flank pain. Pt reports onset 2 days PTA of right flank pain that felt like \"a kid in the kidney\" and has not allowed her to sleep. Pt reports pain is exacerbated by ambulation. Pt reports since onset of pain it has traveled from her back to her abd. Pt also reports nausea secondary to pain. Pt reports she is currently taking flomax to help pass the stone. Pt reports kidney stone 2 months PTA, which she passed. She also reports kidney stone 1 month PTA. Pt reports both previous kidney stones were on her left side. Pt reports she saw her urologist for kidney stones, who said she should be able to pass them. Pt denies fever and chills. There are no other acute medical concerns at this time. Social hx: +EtOH use  PCP: Bambi Leyva MD  Urologist: Junior Delgado. Portillo Carroll M.D. Old Chart Review:   ED visit 6/5/17, 2 months PTA. Dx with 3mm kidney stone in right proximal ureter. Hx of kidney stones in both kidneys. Note written by Jerri Acosta, as dictated by Paxton Vogt MD 6:50 PM       The history is provided by the patient. Past Medical History:   Diagnosis Date    Arthritis     KNEES.  Hypertension     HX OF. NONE SINCE GBP IN 2011    Joint pain     Menopause     Pt states she has not had a period in a year.  Migraines     Morbid obesity (HCC)     LOST 80 PDS AFTER GBP SURGERY.     Other ill-defined conditions     ANXIETY, DEPRESSION    Sinusitis     Snoring        Past Surgical History:   Procedure Laterality Date    ENDOMETRIAL ABLATION, THERMAL      10 years ago    HX ACL RECONSTRUCTION  2/2011    RIGHT KNEE 2ND TIME    HX ACL RECONSTRUCTION  4/2010    RIGHT KNEE    HX CYST REMOVAL      Left ankle in 1999, right wrist 2008, and scalp    HX LAP CHOLECYSTECTOMY  10/18/13    by Dr Luke Spencer and 1995    carpal tunnel both hands    HX OTHER SURGICAL      CYST REMOVED L ANKLE, R WRIST    HX TONSILLECTOMY      childhood    HX TUBAL LIGATION  1993    FL LAP GASTRIC BYPASS/BREANNA-EN-Y  09/21/11    liver biopsy,EGD Dr Tevin Decker, [facility]         Family History:   Problem Relation Age of Onset    Heart Disease Father     Heart Attack Father     Heart Failure Father     Diabetes Brother     Hypertension Brother     Elevated Lipids Brother     Diabetes Maternal Grandfather     Seizures Son    Colton Butler Arthritis-rheumatoid Sister     Heart Failure Paternal Grandmother     Breast Cancer Maternal Aunt      age unknown       Social History     Social History    Marital status:      Spouse name: N/A    Number of children: N/A    Years of education: N/A     Occupational History    Not on file. Social History Main Topics    Smoking status: Never Smoker    Smokeless tobacco: Never Used    Alcohol use Yes      Comment: 2 drinks 2 times a month    Drug use: No    Sexual activity: Not Currently     Other Topics Concern    Not on file     Social History Narrative         ALLERGIES: Other medication    Review of Systems   Constitutional: Negative for activity change, appetite change, chills, fatigue and fever. HENT: Negative for ear pain, facial swelling, sore throat and trouble swallowing. Eyes: Negative for pain, discharge and visual disturbance. Respiratory: Negative for chest tightness, shortness of breath and wheezing. Cardiovascular: Negative for chest pain and palpitations. Gastrointestinal: Positive for nausea. Negative for abdominal pain, blood in stool and vomiting. Genitourinary: Positive for flank pain. Negative for difficulty urinating and hematuria. Musculoskeletal: Negative for arthralgias, joint swelling, myalgias and neck pain. Skin: Negative for color change and rash. Neurological: Negative for dizziness, weakness, numbness and headaches. Hematological: Negative for adenopathy.  Does not bruise/bleed easily. Psychiatric/Behavioral: Negative for behavioral problems, confusion and sleep disturbance. All other systems reviewed and are negative. Vitals:    08/03/17 1805   BP: (!) 199/97   Pulse: 60   Resp: 16   Temp: 97.9 °F (36.6 °C)   SpO2: 98%   Weight: 103 kg (227 lb)   Height: 5' 4\" (1.626 m)            Physical Exam   Constitutional: She is oriented to person, place, and time. She appears well-developed and well-nourished. No distress. HENT:   Head: Normocephalic and atraumatic. Nose: Nose normal.   Mouth/Throat: Oropharynx is clear and moist.   Eyes: Conjunctivae and EOM are normal. Pupils are equal, round, and reactive to light. No scleral icterus. Neck: Normal range of motion. Neck supple. No JVD present. No tracheal deviation present. No thyromegaly present. No carotid bruits noted. Cardiovascular: Normal rate, regular rhythm, normal heart sounds and intact distal pulses. Exam reveals no gallop and no friction rub. No murmur heard. Pulmonary/Chest: Effort normal and breath sounds normal. No respiratory distress. She has no wheezes. She has no rales. She exhibits no tenderness. Abdominal: Soft. Bowel sounds are normal. She exhibits no distension and no mass. There is tenderness (over right flank and CVA, mild tenderness over interior abd on right side). There is no rebound and no guarding. Musculoskeletal: Normal range of motion. She exhibits no edema or tenderness. Lymphadenopathy:     She has no cervical adenopathy. Neurological: She is alert and oriented to person, place, and time. She has normal reflexes. No cranial nerve deficit. Coordination normal.   Skin: Skin is warm and dry. No rash noted. No erythema. Psychiatric: She has a normal mood and affect. Her behavior is normal. Judgment and thought content normal.   Nursing note and vitals reviewed.   Note written by Jerri Oro, as dictated by Janine Burroughs MD 6:50 PM      MDM  Number of Diagnoses or Management Options  Kidney stone: new and requires workup     Amount and/or Complexity of Data Reviewed  Clinical lab tests: ordered and reviewed  Tests in the radiology section of CPT®: ordered and reviewed  Decide to obtain previous medical records or to obtain history from someone other than the patient: yes  Review and summarize past medical records: yes  Discuss the patient with other providers: yes  Independent visualization of images, tracings, or specimens: yes    Risk of Complications, Morbidity, and/or Mortality  Presenting problems: high  Diagnostic procedures: high  Management options: high    Patient Progress  Patient progress: stable    ED Course       Procedures    PROGRESS NOTE:  7:10 PM  Will ultrasound abd because known stones are small. Likely will be kidney stone. There is hydronephrosis on the leftDown to the ureterovesical junction. It would appear that there is a distal stone on the left side. Her previous stone was on the right. The patient is comfortable after medications and will be discharged home to followup with urology in the next 3-4 days or sooner for further evaluation and treatment. She voices understanding of these instructions and is discharged in no acute distress.

## 2017-08-03 NOTE — ED TRIAGE NOTES
TRIAGE NOTE: Pt complains of RIGHT flank pain on off since Tuesday and nausea.   Was diagnosed with a kidney stone recently, states this feels similar

## 2017-08-04 ENCOUNTER — PATIENT OUTREACH (OUTPATIENT)
Dept: OTHER | Age: 52
End: 2017-08-04

## 2017-08-04 NOTE — PROGRESS NOTES
Patient on report as with discharge from ED visit 08/03 for kidney stone with hydronephrosis. Noted this patient has not responded to previous attempts to contact her via phone and letters. Initial attempt to contact patient for this episode of transitions of care. Left discreet message on voicemail with this CM contact information. Will attempt to contact again. ED Prescriptions   Medication Sig Dispense Start Date End Date Auth. Provider   cephALEXin (KEFLEX) 500 mg capsule Take 1 Cap by mouth four (4) times daily for 7 days. 28 Cap 8/3/2017 8/10/2017 Janine Burroughs MD   HYDROcodone-acetaminophen [facility]) 5-325 mg per tablet Take 1 Tab by mouth every four (4) hours as needed for Pain. Max Daily Amount: 6 Tabs. 20 Tab 8/3/2017  Janine Burroughs MD   ketorolac (TORADOL) 10 mg tablet Take 1 Tab by mouth every six (6) hours as needed for Pain. 20 Tab 8/3/2017  Janine Burroughs MD   tamsulosin [facility]) 0.4 mg capsule Take 1 Cap by mouth daily.  12 Cap 8/3/2017  Janine Burroughs MD   Follow-up Information   Follow up With Details Comments [address], [place]   [address]    [address] [address]   [address] [facility] [place] [facility] [address] [employer]   [address] [address] [facility] 7 [phone]

## 2017-08-04 NOTE — DISCHARGE INSTRUCTIONS
Kidney Stone: Care Instructions  Your Care Instructions    Kidney stones are formed when salts, minerals, and other substances normally found in the urine clump together. They can be as small as grains of sand or, rarely, as large as golf balls. While the stone is traveling through the ureter, which is the tube that carries urine from the kidney to the bladder, you will probably feel pain. The pain may be mild or very severe. You may also have some blood in your urine. As soon as the stone reaches the bladder, any intense pain should go away. If a stone is too large to pass on its own, you may need a medical procedure to help you pass the stone. The doctor has checked you carefully, but problems can develop later. If you notice any problems or new symptoms, get medical treatment right away. Follow-up care is a key part of your treatment and safety. Be sure to make and go to all appointments, and call your doctor if you are having problems. It's also a good idea to know your test results and keep a list of the medicines you take. How can you care for yourself at home? · Drink plenty of fluids, enough so that your urine is light yellow or clear like water. If you have kidney, heart, or liver disease and have to limit fluids, talk with your doctor before you increase the amount of fluids you drink. · Take pain medicines exactly as directed. Call your doctor if you think you are having a problem with your medicine. ¨ If the doctor gave you a prescription medicine for pain, take it as prescribed. ¨ If you are not taking a prescription pain medicine, ask your doctor if you can take an over-the-counter medicine. Read and follow all instructions on the label. · Your doctor may ask you to strain your urine so that you can collect your kidney stone when it passes. You can use a kitchen strainer or a tea strainer to catch the stone. Store it in a plastic bag until you see your doctor again.   Preventing future kidney stones  Some changes in your diet may help prevent kidney stones. Depending on the cause of your stones, your doctor may recommend that you:  · Drink plenty of fluids, enough so that your urine is light yellow or clear like water. If you have kidney, heart, or liver disease and have to limit fluids, talk with your doctor before you increase the amount of fluids you drink. · Limit coffee, tea, and alcohol. Also avoid grapefruit juice. · Do not take more than the recommended daily dose of vitamins C and D.  · Avoid antacids such as Gaviscon, Maalox, Mylanta, or Tums. · Limit the amount of salt (sodium) in your diet. · Eat a balanced diet that is not too high in protein. · Limit foods that are high in a substance called oxalate, which can cause kidney stones. These foods include dark green vegetables, rhubarb, chocolate, wheat bran, nuts, cranberries, and beans. When should you call for help? Call your doctor now or seek immediate medical care if:  · You cannot keep down fluids. · Your pain gets worse. · You have a fever or chills. · You have new or worse pain in your back just below your rib cage (the flank area). · You have new or more blood in your urine. Watch closely for changes in your health, and be sure to contact your doctor if:  · You do not get better as expected. Where can you learn more? Go to http://florencia-maurice.info/. Enter I091 in the search box to learn more about \"Kidney Stone: Care Instructions. \"  Current as of: April 3, 2017  Content Version: 11.3  © 3501-7619 [employer]. Care instructions adapted under license by [employer] (which disclaims liability or warranty for this information). If you have questions about a medical condition or this instruction, always ask your healthcare professional. [address] any warranty or liability for your use of this information.

## 2017-08-07 ENCOUNTER — PATIENT OUTREACH (OUTPATIENT)
Dept: OTHER | Age: 52
End: 2017-08-07

## 2017-08-07 NOTE — LETTER
8/7/2017 1:39 PM 
 
Ms. Misael Garcia [address] [facility] [address] Dear Sujata Alhaji  My name is Rhonda Hdez , Employee Care Manager for [employer], and I have been trying to reach you. The Employee Care  is a free-of-charge, confidential service provided to our employees and their family members covered by the [employer]. Part of my job is to follow up with members who have recently been in the hospital or emergency room, to help them coordinate their care and answer questions they may have about their visit. I am able to provide assistance with medication questions, scheduling needed follow-up appointments, and arranging services like home health or home medical equipment. I can also provide education regarding your hospital or ER visit as well as your medical conditions. As healthcare providers, we know that patients do better when they have close follow up with a primary care provider (PCP), especially after a hospital or emergency department visit. If you do not have a PCP, I can help you find one that is convenient to you and covered by your insurance. I can also help you understand any after visit instructions, such as what symptoms to watch out for, or any new or changed medications. Remember that you can access your After Visit Summary by logging into your [employer] account. If you do not have a [employer] account, I can help you request access. Our program is designed to provide you with the opportunity to have a [employer] care manager partner with you for your healthcare needs. Please contact me at the below number if I can provide you with assistance for any of the above services.  
 
 
Sincerely, 
 
 
 
 
 
 
Rhonda Hdez RN, [place] [address], [address] [address].  
Phone:  [phone]     Fax:  [phone]    [email or web address]

## 2017-08-07 NOTE — PROGRESS NOTES
Patient on report as with discharge from ED visit for kidney stone with hydronephrosis. Second attempt to contact patient for transitions of care. Left discreet message on voicemail with this CM contact information. Unable to reach letter sent via Mail. Noted previously UTR with multiple attempts. Will follow for one month for transitions of care needs.

## 2017-08-11 ENCOUNTER — HOSPITAL ENCOUNTER (OUTPATIENT)
Dept: PREADMISSION TESTING | Age: 52
Discharge: HOME | End: 2017-08-11
Attending: UROLOGY
Payer: COMMERCIAL

## 2017-08-11 VITALS
SYSTOLIC BLOOD PRESSURE: 154 MMHG | DIASTOLIC BLOOD PRESSURE: 75 MMHG | HEIGHT: 64 IN | HEART RATE: 58 BPM | RESPIRATION RATE: 18 BRPM | WEIGHT: 219.8 LBS | OXYGEN SATURATION: 99 % | TEMPERATURE: 98.4 F | BODY MASS INDEX: 37.52 KG/M2

## 2017-08-11 LAB
ABO + RH BLD: NORMAL
ANION GAP BLD CALC-SCNC: 5 MMOL/L (ref 5–15)
APPEARANCE UR: ABNORMAL
ATRIAL RATE: 56 BPM
BACTERIA URNS QL MICRO: NEGATIVE /HPF
BILIRUB UR QL: NEGATIVE
BLOOD GROUP ANTIBODIES SERPL: NORMAL
BUN SERPL-MCNC: 18 MG/DL (ref 6–20)
BUN/CREAT SERPL: 20 (ref 12–20)
CALCIUM SERPL-MCNC: 8.7 MG/DL (ref 8.5–10.1)
CALCULATED P AXIS, ECG09: 38 DEGREES
CALCULATED R AXIS, ECG10: -22 DEGREES
CALCULATED T AXIS, ECG11: -10 DEGREES
CHLORIDE SERPL-SCNC: 103 MMOL/L (ref 97–108)
CO2 SERPL-SCNC: 31 MMOL/L (ref 21–32)
COLOR UR: ABNORMAL
CREAT SERPL-MCNC: 0.92 MG/DL (ref 0.55–1.02)
DIAGNOSIS, 93000: NORMAL
EPITH CASTS URNS QL MICRO: ABNORMAL /LPF
ERYTHROCYTE [DISTWIDTH] IN BLOOD BY AUTOMATED COUNT: 16.3 % (ref 11.5–14.5)
GLUCOSE SERPL-MCNC: 76 MG/DL (ref 65–100)
GLUCOSE UR STRIP.AUTO-MCNC: NEGATIVE MG/DL
HCT VFR BLD AUTO: 38.9 % (ref 35–47)
HGB BLD-MCNC: 12.4 G/DL (ref 11.5–16)
HGB UR QL STRIP: ABNORMAL
HYALINE CASTS URNS QL MICRO: ABNORMAL /LPF (ref 0–5)
KETONES UR QL STRIP.AUTO: NEGATIVE MG/DL
LEUKOCYTE ESTERASE UR QL STRIP.AUTO: ABNORMAL
MCH RBC QN AUTO: 25.8 PG (ref 26–34)
MCHC RBC AUTO-ENTMCNC: 31.9 G/DL (ref 30–36.5)
MCV RBC AUTO: 81 FL (ref 80–99)
NITRITE UR QL STRIP.AUTO: NEGATIVE
P-R INTERVAL, ECG05: 138 MS
PH UR STRIP: 6.5 [PH] (ref 5–8)
PLATELET # BLD AUTO: 211 K/UL (ref 150–400)
POTASSIUM SERPL-SCNC: 4 MMOL/L (ref 3.5–5.1)
PROT UR STRIP-MCNC: NEGATIVE MG/DL
Q-T INTERVAL, ECG07: 394 MS
QRS DURATION, ECG06: 80 MS
QTC CALCULATION (BEZET), ECG08: 380 MS
RBC # BLD AUTO: 4.8 M/UL (ref 3.8–5.2)
RBC #/AREA URNS HPF: ABNORMAL /HPF (ref 0–5)
SODIUM SERPL-SCNC: 139 MMOL/L (ref 136–145)
SP GR UR REFRACTOMETRY: 1.02 (ref 1–1.03)
SPECIMEN EXP DATE BLD: NORMAL
UROBILINOGEN UR QL STRIP.AUTO: 0.2 EU/DL (ref 0.2–1)
VENTRICULAR RATE, ECG03: 56 BPM
WBC # BLD AUTO: 6.1 K/UL (ref 3.6–11)
WBC URNS QL MICRO: ABNORMAL /HPF (ref 0–4)

## 2017-08-11 RX ORDER — POTASSIUM CITRATE 10 MEQ/1
10 TABLET, EXTENDED RELEASE ORAL DAILY
COMMUNITY
End: 2021-07-29

## 2017-08-11 NOTE — PERIOP NOTES
[facility]  Preoperative Instructions        Surgery Date 8/14/2017          Time of Arrival 7:45 AM    1. On the day of your surgery, please report to the Surgical Services Registration Desk and sign in at your designated time. The Surgery Center is located to the right of the Emergency Room. 2. You must have someone with you to drive you home. You should not drive a car for 24 hours following surgery. Please make arrangements for a friend or family member to stay with you for the first 24 hours after your surgery. 3. Do not have anything to eat or drink (including water, gum, mints, coffee, juice) after midnight 8/13/2017? Naoma Broccoli ? This may not apply to medications prescribed by your physician. ?(Please note below the special instructions with medications to take the morning of your procedure.)    4. We recommend you do not drink any alcoholic beverages for 24 hours before and after your surgery. 5. Stop all Aspirin, non-steroidal anti-inflammatory drugs (i.e. Advil, Aleve), vitamins, and supplements?as directed by your surgeon's office. ? **If you are currently taking Plavix, Coumadin, or other blood-thinning agents, contact your surgeon for instructions. **    6. Wear comfortable clothes. Wear glasses instead of contacts. Do not bring any money or jewelry. Please bring picture ID, insurance card, and any prearranged co-payment or hospital payment. Do not wear make-up, particularly mascara the morning of your surgery. Do not wear nail polish, particularly if you are having foot /hand surgery. Wear your hair loose or down, no ponytails, buns, ariel pins or clips. All body piercings must be removed. Please shower with antibacterial soap for three consecutive days before and on the morning of surgery, but do not apply any lotions, powders or deodorants after the shower on the day of surgery. Please use a fresh towels after each shower.  Please sleep in clean clothes and change bed linens the night before surgery. Please do not shave for 48 hours prior to surgery. Shaving of the face is acceptable. 7. You should understand that if you do not follow these instructions your surgery may be cancelled. If your physical condition changes (I.e. fever, cold or flu) please contact your surgeon as soon as possible. 8. It is important that you be on time. If a situation occurs where you may be late, please call [phone] (OR Holding Area). 9. If you have any questions and or problems, please call [phone] (Pre-admission Testing). 10. Your surgery time may be subject to change. You will receive a phone call the evening prior if your time changes. 11.  If having outpatient surgery, you must have someone to drive you here, stay with you during the duration of your stay, and to drive you home at time of discharge. Special Instructions:None    MEDICATIONS TO TAKE THE MORNING OF SURGERY WITH A SIP OF WATER:Hydrocodone if needed, Effexor XR, Flomax    I understand a pre-operative phone call will be made to verify my surgery time. In the event that I am not available, I give permission for a message to be left on my answering service and/or with another person?   Yes [phone]         ___________________      __________   _________    (Signature of Patient)             (Witness)                (Date and Time)

## 2017-08-13 LAB
BACTERIA SPEC CULT: NORMAL
CC UR VC: NORMAL
SERVICE CMNT-IMP: NORMAL

## 2017-08-14 ENCOUNTER — APPOINTMENT (OUTPATIENT)
Dept: GENERAL RADIOLOGY | Age: 52
End: 2017-08-14
Attending: UROLOGY
Payer: COMMERCIAL

## 2017-08-14 ENCOUNTER — ANESTHESIA (OUTPATIENT)
Dept: SURGERY | Age: 52
End: 2017-08-14
Payer: COMMERCIAL

## 2017-08-14 ENCOUNTER — HOSPITAL ENCOUNTER (OUTPATIENT)
Age: 52
Setting detail: OUTPATIENT SURGERY
Discharge: HOME | End: 2017-08-14
Attending: UROLOGY | Admitting: UROLOGY
Payer: COMMERCIAL

## 2017-08-14 ENCOUNTER — ANESTHESIA EVENT (OUTPATIENT)
Dept: SURGERY | Age: 52
End: 2017-08-14
Payer: COMMERCIAL

## 2017-08-14 VITALS
HEIGHT: 64 IN | WEIGHT: 220.02 LBS | OXYGEN SATURATION: 97 % | BODY MASS INDEX: 37.56 KG/M2 | TEMPERATURE: 97.7 F | RESPIRATION RATE: 14 BRPM | HEART RATE: 53 BPM | DIASTOLIC BLOOD PRESSURE: 78 MMHG | SYSTOLIC BLOOD PRESSURE: 165 MMHG

## 2017-08-14 LAB — HCG UR QL: NEGATIVE

## 2017-08-14 PROCEDURE — 77030018836 HC SOL IRR NACL ICUM -A: Performed by: UROLOGY

## 2017-08-14 PROCEDURE — 74011250636 HC RX REV CODE- 250/636

## 2017-08-14 PROCEDURE — 74011000250 HC RX REV CODE- 250

## 2017-08-14 PROCEDURE — 77030020782 HC GWN BAIR PAWS FLX 3M -B

## 2017-08-14 PROCEDURE — 77030012893

## 2017-08-14 PROCEDURE — 76010000161 HC OR TIME 1 TO 1.5 HR INTENSV-TIER 1: Performed by: UROLOGY

## 2017-08-14 PROCEDURE — C1769 GUIDE WIRE: HCPCS | Performed by: UROLOGY

## 2017-08-14 PROCEDURE — 74011636320 HC RX REV CODE- 636/320: Performed by: UROLOGY

## 2017-08-14 PROCEDURE — 74011250636 HC RX REV CODE- 250/636: Performed by: UROLOGY

## 2017-08-14 PROCEDURE — 77030037193 HC FBR LSR HOLM 365 MIC DISP LENI -C: Performed by: UROLOGY

## 2017-08-14 PROCEDURE — 74011000250 HC RX REV CODE- 250: Performed by: UROLOGY

## 2017-08-14 PROCEDURE — 77030018832 HC SOL IRR H20 ICUM -A: Performed by: UROLOGY

## 2017-08-14 PROCEDURE — 77030032490 HC SLV COMPR SCD KNE COVD -B: Performed by: UROLOGY

## 2017-08-14 PROCEDURE — C2617 STENT, NON-COR, TEM W/O DEL: HCPCS | Performed by: UROLOGY

## 2017-08-14 PROCEDURE — 77030018846 HC SOL IRR STRL H20 ICUM -A: Performed by: UROLOGY

## 2017-08-14 PROCEDURE — 76060000033 HC ANESTHESIA 1 TO 1.5 HR: Performed by: UROLOGY

## 2017-08-14 PROCEDURE — 77030020143 HC AIRWY LARYN INTUB CGAS -A: Performed by: NURSE ANESTHETIST, CERTIFIED REGISTERED

## 2017-08-14 PROCEDURE — 77010033678 HC OXYGEN DAILY

## 2017-08-14 PROCEDURE — 74011636320 HC RX REV CODE- 636/320

## 2017-08-14 PROCEDURE — 74420 UROGRAPHY RTRGR +-KUB: CPT

## 2017-08-14 PROCEDURE — 76210000020 HC REC RM PH II FIRST 0.5 HR: Performed by: UROLOGY

## 2017-08-14 PROCEDURE — 82360 CALCULUS ASSAY QUANT: CPT | Performed by: UROLOGY

## 2017-08-14 PROCEDURE — 76210000006 HC OR PH I REC 0.5 TO 1 HR: Performed by: UROLOGY

## 2017-08-14 PROCEDURE — 81025 URINE PREGNANCY TEST: CPT

## 2017-08-14 PROCEDURE — 74011250636 HC RX REV CODE- 250/636: Performed by: ANESTHESIOLOGY

## 2017-08-14 PROCEDURE — C1758 CATHETER, URETERAL: HCPCS | Performed by: UROLOGY

## 2017-08-14 PROCEDURE — 77030021163 HC TUBE CYSTO IRR ICUM -A: Performed by: UROLOGY

## 2017-08-14 DEVICE — URETERAL STENT
Type: IMPLANTABLE DEVICE | Site: URETER | Status: FUNCTIONAL
Brand: CONTOUR™

## 2017-08-14 RX ORDER — PROPOFOL 10 MG/ML
INJECTION, EMULSION INTRAVENOUS AS NEEDED
Status: DISCONTINUED | OUTPATIENT
Start: 2017-08-14 | End: 2017-08-14 | Stop reason: HOSPADM

## 2017-08-14 RX ORDER — ACETAMINOPHEN 10 MG/ML
INJECTION, SOLUTION INTRAVENOUS AS NEEDED
Status: DISCONTINUED | OUTPATIENT
Start: 2017-08-14 | End: 2017-08-14 | Stop reason: HOSPADM

## 2017-08-14 RX ORDER — CEFAZOLIN SODIUM IN 0.9 % NACL 2 G/100 ML
2 PLASTIC BAG, INJECTION (ML) INTRAVENOUS ONCE
Status: COMPLETED | OUTPATIENT
Start: 2017-08-14 | End: 2017-08-14

## 2017-08-14 RX ORDER — SODIUM CHLORIDE 0.9 % (FLUSH) 0.9 %
5-10 SYRINGE (ML) INJECTION AS NEEDED
Status: DISCONTINUED | OUTPATIENT
Start: 2017-08-14 | End: 2017-08-14 | Stop reason: HOSPADM

## 2017-08-14 RX ORDER — SODIUM CHLORIDE 0.9 % (FLUSH) 0.9 %
5-10 SYRINGE (ML) INJECTION EVERY 8 HOURS
Status: DISCONTINUED | OUTPATIENT
Start: 2017-08-14 | End: 2017-08-14 | Stop reason: HOSPADM

## 2017-08-14 RX ORDER — FENTANYL CITRATE 50 UG/ML
25 INJECTION, SOLUTION INTRAMUSCULAR; INTRAVENOUS
Status: DISCONTINUED | OUTPATIENT
Start: 2017-08-14 | End: 2017-08-14 | Stop reason: HOSPADM

## 2017-08-14 RX ORDER — MIDAZOLAM HYDROCHLORIDE 1 MG/ML
INJECTION, SOLUTION INTRAMUSCULAR; INTRAVENOUS AS NEEDED
Status: DISCONTINUED | OUTPATIENT
Start: 2017-08-14 | End: 2017-08-14 | Stop reason: HOSPADM

## 2017-08-14 RX ORDER — LIDOCAINE HYDROCHLORIDE 20 MG/ML
INJECTION, SOLUTION EPIDURAL; INFILTRATION; INTRACAUDAL; PERINEURAL AS NEEDED
Status: DISCONTINUED | OUTPATIENT
Start: 2017-08-14 | End: 2017-08-14 | Stop reason: HOSPADM

## 2017-08-14 RX ORDER — CEPHALEXIN 500 MG/1
500 CAPSULE ORAL 3 TIMES DAILY
Qty: 15 CAP | Refills: 0 | Status: SHIPPED | OUTPATIENT
Start: 2017-08-14 | End: 2017-08-19

## 2017-08-14 RX ORDER — DIPHENHYDRAMINE HYDROCHLORIDE 50 MG/ML
12.5 INJECTION, SOLUTION INTRAMUSCULAR; INTRAVENOUS AS NEEDED
Status: DISCONTINUED | OUTPATIENT
Start: 2017-08-14 | End: 2017-08-14 | Stop reason: HOSPADM

## 2017-08-14 RX ORDER — SODIUM CHLORIDE, SODIUM LACTATE, POTASSIUM CHLORIDE, CALCIUM CHLORIDE 600; 310; 30; 20 MG/100ML; MG/100ML; MG/100ML; MG/100ML
25 INJECTION, SOLUTION INTRAVENOUS CONTINUOUS
Status: DISCONTINUED | OUTPATIENT
Start: 2017-08-14 | End: 2017-08-14 | Stop reason: HOSPADM

## 2017-08-14 RX ORDER — FENTANYL CITRATE 50 UG/ML
INJECTION, SOLUTION INTRAMUSCULAR; INTRAVENOUS AS NEEDED
Status: DISCONTINUED | OUTPATIENT
Start: 2017-08-14 | End: 2017-08-14 | Stop reason: HOSPADM

## 2017-08-14 RX ORDER — LIDOCAINE HYDROCHLORIDE 20 MG/ML
JELLY TOPICAL ONCE
Status: COMPLETED | OUTPATIENT
Start: 2017-08-14 | End: 2017-08-14

## 2017-08-14 RX ORDER — HYDROCODONE BITARTRATE AND ACETAMINOPHEN 7.5; 325 MG/1; MG/1
1 TABLET ORAL
Qty: 20 TAB | Refills: 0 | Status: SHIPPED | OUTPATIENT
Start: 2017-08-14 | End: 2019-11-26

## 2017-08-14 RX ORDER — LIDOCAINE HYDROCHLORIDE 10 MG/ML
0.1 INJECTION, SOLUTION EPIDURAL; INFILTRATION; INTRACAUDAL; PERINEURAL AS NEEDED
Status: DISCONTINUED | OUTPATIENT
Start: 2017-08-14 | End: 2017-08-14 | Stop reason: HOSPADM

## 2017-08-14 RX ORDER — ONDANSETRON 2 MG/ML
INJECTION INTRAMUSCULAR; INTRAVENOUS AS NEEDED
Status: DISCONTINUED | OUTPATIENT
Start: 2017-08-14 | End: 2017-08-14 | Stop reason: HOSPADM

## 2017-08-14 RX ORDER — HYDROMORPHONE HYDROCHLORIDE 1 MG/ML
0.2 INJECTION, SOLUTION INTRAMUSCULAR; INTRAVENOUS; SUBCUTANEOUS
Status: DISCONTINUED | OUTPATIENT
Start: 2017-08-14 | End: 2017-08-14 | Stop reason: HOSPADM

## 2017-08-14 RX ADMIN — ACETAMINOPHEN 1000 MG: 10 INJECTION, SOLUTION INTRAVENOUS at 10:19

## 2017-08-14 RX ADMIN — PROPOFOL 50 MG: 10 INJECTION, EMULSION INTRAVENOUS at 10:09

## 2017-08-14 RX ADMIN — ONDANSETRON 4 MG: 2 INJECTION INTRAMUSCULAR; INTRAVENOUS at 10:09

## 2017-08-14 RX ADMIN — FENTANYL CITRATE 50 MCG: 50 INJECTION, SOLUTION INTRAMUSCULAR; INTRAVENOUS at 10:16

## 2017-08-14 RX ADMIN — CEFAZOLIN 2 G: 10 INJECTION, POWDER, FOR SOLUTION INTRAVENOUS; PARENTERAL at 09:53

## 2017-08-14 RX ADMIN — LIDOCAINE HYDROCHLORIDE 100 MG: 20 INJECTION, SOLUTION EPIDURAL; INFILTRATION; INTRACAUDAL; PERINEURAL at 10:08

## 2017-08-14 RX ADMIN — MIDAZOLAM HYDROCHLORIDE 2 MG: 1 INJECTION, SOLUTION INTRAMUSCULAR; INTRAVENOUS at 09:59

## 2017-08-14 RX ADMIN — PROPOFOL 150 MG: 10 INJECTION, EMULSION INTRAVENOUS at 10:08

## 2017-08-14 RX ADMIN — SODIUM CHLORIDE, SODIUM LACTATE, POTASSIUM CHLORIDE, AND CALCIUM CHLORIDE 25 ML/HR: 600; 310; 30; 20 INJECTION, SOLUTION INTRAVENOUS at 09:05

## 2017-08-14 NOTE — BRIEF OP NOTE
BRIEF OPERATIVE NOTE    Date of Procedure: 8/14/2017   Preoperative Diagnosis: KIDNEY STONE  Postoperative Diagnosis: * No post-op diagnosis entered *    Procedure(s):  CYSTOSCOPY RIGHT RETROGRADE, RIGHT URETEROSCOPY WITH HOLIUM LASER AND STENTS and left retrograde pyelogram    Surgeon(s) and Role:     * Isabel Anton MD - Primary         Assistant Staff:       Surgical Staff:  Circ-1: Zenaida Montoya RN  Scrub RN-1: Jah Coates RN  Event Time In   Incision Start 1025   Incision Close 1051     Anesthesia: General   Estimated Blood Loss: none  Specimens:   ID Type Source Tests Collected by Time Destination   1 : URETERAL STONE FOR ANALYSIS Stone Ureter STONE ANALYSIS, URINARY Isabel Anton MD 8/14/2017 1049 Other (See Notes)      Findings: Distal right ureteral stone   Complications: none  Implants: * No implants in log *

## 2017-08-14 NOTE — DISCHARGE INSTRUCTIONS
You did great. There was a small stone in the tube on the right near the bladder that I removed. I did leave a stent. You may see blood in your urine which is okay and may have some burning and pressure when you urinate. The stent will come out in 2 days in the office. Call [facility] to make sure you have an appt. Also, call this number anytime for any problems or questions. Take flomax and pain medicine and antibiotics as directed. Drink plenty of fluids. Balta Leger M.D.  [phone]       Laser Lithotripsy: What to Expect at [address] lithotripsy is a way to treat kidney stones. This treatment uses a laser to break kidney stones into tiny pieces. For several hours after the procedure you may have a burning feeling when you urinate. You may feel the urge to go even if you don't need to. This feeling should go away within a day. Drinking a lot of water can help. Your doctor also may advise you to take medicine that numbs the burning. This medicine is called phenazopyridine. It is available by prescription and over the counter. Brand names include Pyridium and Uristat. Your doctor may prescribe an antibiotic. This will help prevent an infection. You may have some blood in your urine for 2 or 3 days. Your doctor may have placed a small tube inside one of your ureters. Ureters are the tubes that connect the kidneys to the bladder. The small tube the doctor may have placed is called a stent. It may help the stone fragments pass through your body. Your doctor may remove the stent in a few weeks. Most stone fragments that are not removed pass out of the body within 24 hours. But sometimes it can take many weeks. If you have a large stone, you may need to come back for more treatments. This care sheet gives you a general idea about how long it will take for you to recover. But each person recovers at a different pace.  Follow the steps below to feel better as quickly as possible. How can you care for yourself at home? Activity  · Rest as much as you need to after you go home. · You may do your regular activities. But avoid hard exercise or sports for about a week or until there is no blood in your urine. Diet  · You can eat your normal diet after lithotripsy. · Continue to drink plenty of fluids, enough so that your urine is light yellow or clear like water. If you have kidney, heart, or liver disease and have to limit fluids, talk with your doctor before you increase the amount of fluids you drink. Medicines  · Your doctor will tell you if and when you can restart your medicines. He or she will also give you instructions about taking any new medicines. · If you take blood thinners, such as warfarin (Coumadin), clopidogrel (Plavix), or aspirin, be sure to talk to your doctor. He or she will tell you if and when to start taking those medicines again. Make sure that you understand exactly what your doctor wants you to do. · If you take medicine to stop the burning when you urinate, take it exactly as recommended. Call your doctor if you think you are having a problem with your medicine. This medicine may color your urine orange or red. This is normal. You will get more details on the specific medicine your doctor recommends. · If your doctor prescribed antibiotics, take them as directed. Do not stop taking them just because you feel better. You need to take the full course of antibiotics. · Be safe with medicines. Read and follow all instructions on the label. ¨ If the doctor gave you a prescription medicine for pain, take it as prescribed. ¨ If you are not taking a prescription pain medicine, ask your doctor if you can take acetaminophen (Tylenol). Do not take ibuprofen (Advil, Motrin) or naproxen (Aleve) or similar medicines unless your doctor tells you to. ¨ Do not take two or more pain medicines at the same time unless the doctor told you to.  Many pain medicines have acetaminophen, which is Tylenol. Too much acetaminophen (Tylenol) can be harmful. Heat  · Take a warm bath. This may soothe the burning. Other instructions  · Urinate through the strainer the doctor gives you. Save any stone pieces, including those that look like sand or gravel. Take these to your doctor. This will help your doctor find the cause of your stones. Follow-up care is a key part of your treatment and safety. Be sure to make and go to all appointments, and call your doctor if you are having problems. It's also a good idea to know your test results and keep a list of the medicines you take. When should you call for help? Call your doctor now or seek immediate medical care if:  · You have severe pain that does not get better after you take pain medicine. · You have severe pain when you urinate. · You have a fever over 100°F.  · You are not able to urinate within 6 to 8 hours after the procedure. · Your urine is still bright red 48 hours after the procedure. Watch closely for any changes in your health, and be sure to contact your doctor if:  · You have pain or burning when you urinate. A burning sensation is normal for a day or two after the test, but call if it does not get better. · You have a frequent urge to urinate but can pass only small amounts of urine. · Your urine is pink or cloudy or smells bad. It is normal for the urine to have a pinkish color for 2 or 3 days after the test, but call if it does not get better. · You do not get better as expected. Where can you learn more? Go to http://florencia-maurice.info/. Enter Q239 in the search box to learn more about \"Laser Lithotripsy: What to Expect at Home. \"  Current as of: April 3, 2017  Content Version: 11.3  © 3134-9089 [employer]. Care instructions adapted under license by [employer] (which disclaims liability or warranty for this information).  If you have questions about a medical condition or this instruction, always ask your healthcare professional. [address] any warranty or liability for your use of this information. DISCHARGE SUMMARY from Nurse    The following personal items are in your possession at time of discharge:    Dental Appliances: None  Visual Aid: None     Home Medications: None  Jewelry: None  Clothing: Other (comment) (street clothes)  Other Valuables: None  Personal Items Sent to Safe: none          PATIENT INSTRUCTIONS:    After general anesthesia or intravenous sedation, for 24 hours or while taking prescription Narcotics:  · Limit your activities  · Do not drive and operate hazardous machinery  · Do not make important personal or business decisions  · Do  not drink alcoholic beverages  · If you have not urinated within 8 hours after discharge, please contact your surgeon on call. Report the following to your surgeon:  · Excessive pain, swelling, redness or odor of or around the surgical area  · Temperature over 100.5  · Nausea and vomiting lasting longer than 4 hours or if unable to take medications  · Any signs of decreased circulation or nerve impairment to extremity: change in color, persistent  numbness, tingling, coldness or increase pain  · Any questions        What to do at Home:    *  Please give a list of your current medications to your Primary Care Provider. *  Please update this list whenever your medications are discontinued, doses are      changed, or new medications (including over-the-counter products) are added. *  Please carry medication information at all times in case of emergency situations. These are general instructions for a healthy lifestyle:    No smoking/ No tobacco products/ Avoid exposure to second hand smoke    Surgeon General's Warning:  Quitting smoking now greatly reduces serious risk to your health.     Obesity, smoking, and sedentary lifestyle greatly increases your risk for illness    A healthy diet, regular physical exercise & weight monitoring are important for maintaining a healthy lifestyle    You may be retaining fluid if you have a history of heart failure or if you experience any of the following symptoms:  Weight gain of 3 pounds or more overnight or 5 pounds in a week, increased swelling in our hands or feet or shortness of breath while lying flat in bed. Please call your doctor as soon as you notice any of these symptoms; do not wait until your next office visit. Recognize signs and symptoms of STROKE:    F-face looks uneven    A-arms unable to move or move unevenly    S-speech slurred or non-existent    T-time-call 911 as soon as signs and symptoms begin-DO NOT go       Back to bed or wait to see if you get better-TIME IS BRAIN. Warning Signs of HEART ATTACK     Call 911 if you have these symptoms:   Chest discomfort. Most heart attacks involve discomfort in the center of the chest that lasts more than a few minutes, or that goes away and comes back. It can feel like uncomfortable pressure, squeezing, fullness, or pain.  Discomfort in other areas of the upper body. Symptoms can include pain or discomfort in one or both arms, the back, neck, jaw, or stomach.  Shortness of breath with or without chest discomfort.  Other signs may include breaking out in a cold sweat, nausea, or lightheadedness. Don't wait more than five minutes to call 911 - MINUTES MATTER! Fast action can save your life. Calling 911 is almost always the fastest way to get lifesaving treatment. Emergency Medical Services staff can begin treatment when they arrive -- up to an hour sooner than if someone gets to the hospital by car. The discharge information has been reviewed with the patient and caregiver. The patient and caregiver verbalized understanding. Discharge medications reviewed with the patient and caregiver and appropriate educational materials and side effects teaching were provided.     A common side effect of anesthesia following surgery is nausea and/or vomiting. In order to decrease symptoms, it is wise to avoid foods that are high in fat, greasy foods, milk products, and spicy foods for the first 24 hours. Acceptable foods for the first 24 hours following surgery include but are not limited to:     soup   broth    toast    crackers    applesauce    bananas    mashed potatoes,   soft or scrambled eggs   oatmeal    jello    It is important to eat when taking your pain medication. This will help to prevent nausea. If possible, please try to time your meals with your medications. It is very important to stay hydrated following surgery. Sip fluids frequently while awake. Avoid acidic drinks such as citrus juices and soda for 24 hours. Carbonated beverages may cause bloating and gas. Acceptable fluids include:    - water (flavor packets may add variety)  - coffee or tea (in moderation)  - Gatorade  - Ronal-aid  - apple juice  - cranberry juice    You are encouraged to cough and deep breathe every hour when awake. This will help to prevent respiratory complications following anesthesia. You may want to hug a pillow when coughing and sneezing to add additional support to the surgical area and to decrease discomfort if you had abdominal or chest surgery. If you are discharged home with support stockings, you may remove them after 24 hours. Support stockings are used to help prevent blood clots in the legs following surgery. Please take time to review all of your Home Care Instructions and Medication Information sheets provided in your discharge packet. If you have any questions, please contact your surgeons office. Thank you. Cephalexin (By mouth)   Cephalexin ([id number])  Treats infections. This medicine is a cephalosporin antibiotic. Brand Name(s): Dakenyatta Keflex   There may be other brand names for this medicine. When This Medicine Should Not Be Used:    This medicine is not right for everyone. Do not use this medicine if you had an allergic reaction to cephalexin or another cephalosporin medicine. How to Use This Medicine:   Capsule, Tablet, Tablet for Suspension, Liquid  · Your doctor will tell you how much medicine to use. Do not use more than directed. · Read and follow the patient instructions that come with this medicine. Talk to your doctor or pharmacist if you have any questions. · You may take your medicine with food or milk to avoid stomach upset. · Oral liquid: Shake well just before use. Measure the oral liquid medicine with a marked measuring spoon, oral syringe, or medicine cup. · Take all of the medicine in your prescription to clear up your infection, even if you feel better after the first few doses. · Missed dose: Take a dose as soon as you remember. If it is almost time for your next dose, wait until then and take a regular dose. Do not take extra medicine to make up for a missed dose. · Capsule or tablet: Store at room temperature away from heat, moisture, and direct light. · Oral liquid: Store in the refrigerator for 14 days. After 14 days, throw away any unused medicine. Do not freeze. Drugs and Foods to Avoid:   Ask your doctor or pharmacist before using any other medicine, including over-the-counter medicines, vitamins, and herbal products. · Some medicines and foods can affect how cephalexin works. Tell your doctor if you are also using  ¨ Metformin  ¨ Probenecid  Warnings While Using This Medicine:   · Tell your doctor if you are pregnant or breastfeeding, or if you have kidney disease, liver disease, or a history of digestive problems, such as colitis. Tell your doctor if you are allergic to penicillin. · This medicine can cause diarrhea. Call your doctor if the diarrhea becomes severe, does not stop, or is bloody. Do not take any medicine to stop diarrhea until you have talked to your doctor.  Diarrhea can occur 2 months or more after you stop taking this medicine. · Tell any doctor or dentist who treats you that you are using this medicine. This medicine may affect certain medical test results. · Call your doctor if your symptoms do not improve or if they get worse. · Keep all medicine out of the reach of children. Never share your medicine with anyone. Possible Side Effects While Using This Medicine:   Call your doctor right away if you notice any of these side effects:  · Allergic reaction: Itching or hives, swelling in your face or hands, swelling or tingling in your mouth or throat, chest tightness, trouble breathing  · Blistering, peeling, red skin rash  · Severe diarrhea, especially if bloody or ongoing  · Severe stomach pain, vomiting  If you notice these less serious side effects, talk with your doctor:   · Mild diarrhea or nausea  If you notice other side effects that you think are caused by this medicine, tell your doctor. Call your doctor for medical advice about side effects. You may report side effects to FDA at 9-567-FDA-7015  © 2017 [address]  Information is for End User's use only and may not be sold, redistributed or otherwise used for commercial purposes. The above information is an  only. It is not intended as medical advice for individual conditions or treatments. Talk to your doctor, nurse or pharmacist before following any medical regimen to see if it is safe and effective for you. Narcotic-Analgesic/Acetaminophen (By mouth)   Relieves pain. Brand Name(s): Capital w/Codeine, Debord, Echt, [place], Lorcet HD, Lorcet Plus, Lortab 10/325, Lortab 5/325, Lortab 7.5/325, Lortab Elixir, [facility], [facility], [facility], [facility]   There may be other brand names for this medicine. When This Medicine Should Not Be Used: You should not use this medicine if you have had an allergic reaction to acetaminophen, codeine, hydrocodone, propoxyphene, or sulfites.  You should not use this medicine if you have had an allergic reaction to any other opioid pain medicine. How to Use This Medicine:   Liquid, Tablet, Capsule  · Your doctor will tell you how much medicine to use. Do not use more than directed. · This medicine contains acetaminophen. Read the labels of all other medicines you are using to see if they also contain acetaminophen, or ask your doctor or pharmacist. Mari Medford not use more than 4 grams (4,000 milligrams) total of acetaminophen in one day. · Drink plenty of liquids to help avoid constipation. If a dose is missed:   · Some of these medicines need to be used on a fixed schedule. If you miss a dose or forget to use your medicine, call your doctor pharmacist for instructions. Do not use extra medicine to make up for a missed dose. How to Store and Dispose of This Medicine:   · Store the medicine in a closed container at room temperature, away from heat, moisture, and direct light. Do not refrigerate or freeze the medicine. · Ask your pharmacist, doctor, or health caregiver about the best way to dispose of any outdated medicine or medicine no longer needed. · Keep all medicine out of the reach of children. Never share your medicine with anyone. Drugs and Foods to Avoid:   Ask your doctor or pharmacist before using any other medicine, including over-the-counter medicines, vitamins, and herbal products. · Make sure your doctor knows if you are using a monoamine oxidase inhibitor (MAOI) medicine, such as Eldepryl®, Marplan®, [place], or Parnate®. Make sure your doctor knows if you are also using a medicine to treat depression, such as amitriptyline, doxepin, nortriptyline, Elavil®, Pamelor®, or Sinequan®. Make sure your doctor knows if you are taking an anticholinergic medicine, such as atropine, methscopolamine, or scopolamine. · Tell your doctor if you use anything else that makes you sleepy. Some examples are allergy medicine, narcotic pain medicine, and alcohol.   · Do not drink alcohol while you are using this medicine. Warnings While Using This Medicine:   · Make sure your doctor knows if you are pregnant or breast feeding, or if you have a head injury, or other conditions that may cause an increase in intercranial pressure (pressure inside your head). Make sure your doctor knows if you have severe kidney problems or severe liver problems, or if you have hypothyroidism (lack of thyroid function). Make sure your doctor knows if you have Foley's disease (adrenal gland disease), or if you have enlarged prostate or urethral stricture. Make sure your doctor knows if you have any abdominal problems, or if you have lung disease or asthma. · This medicine may make you dizzy or drowsy. Avoid driving, using machines, or anything else that could be dangerous if you are not alert. · This medicine can be habit-forming. Do not use more than your prescribed dose. Call your doctor if you think your medicine is not working. · Tell any doctor or dentist who treats you that you are using this medicine. This medicine may affect certain medical test results. · This medicine may cause constipation, especially with long-term use. Ask your doctor if you should use a laxative to prevent and treat constipation. · When a mother is breastfeeding and takes codeine, there is a very small chance that this medicine could cause serious side effects in the baby. This is because codeine works differently in a few women, so their breast milk contains too much medicine. If you take codeine, be alert for these signs of overdose in your nursing baby: sleeping more than usual, trouble breastfeeding, trouble breathing, or being limp and weak. Call the baby's doctor right away if you think there is a problem. If you cannot talk to the doctor, take the baby to the emergency room or call 911.   Possible Side Effects While Using This Medicine:   Call your doctor right away if you notice any of these side effects:  · Allergic reaction: Itching or hives, swelling in your face or hands, swelling or tingling in your mouth or throat, chest tightness, trouble breathing  · Dizziness. · Seeing or hearing things that are not there. · Very slow heartbeat. If you notice these less serious side effects, talk with your doctor:   · Change in how much or how often you urinate. · Cold, clammy skin. · Feeling unusually anxious, excited, fearful, or tired. · Nausea, vomiting, constipation, stomach pain or upset, or heartburn. · Skin rash. · Vision changes. If you notice other side effects that you think are caused by this medicine, tell your doctor. Call your doctor for medical advice about side effects. You may report side effects to FDA at 7-385-XHB-5566  © 2017 [facility] Information is for End User's use only and may not be sold, redistributed or otherwise used for commercial purposes. The above information is an  only. It is not intended as medical advice for individual conditions or treatments. Talk to your doctor, nurse or pharmacist before following any medical regimen to see if it is safe and effective for you.

## 2017-08-14 NOTE — ANESTHESIA POSTPROCEDURE EVALUATION
Post-Anesthesia Evaluation and Assessment    Patient: Yayo Mercer MRN: [id number]  SSN: [id number]    YOB: 1965  Age: 46 y.o. Sex: female       Cardiovascular Function/Vital Signs  Visit Vitals    /87    Pulse (!) 59    Temp 36.4 °C (97.6 °F)    Resp 12    Ht 5' 4\" (1.626 m)    Wt 99.8 kg (220 lb 0.3 oz)    SpO2 100%    BMI 37.77 kg/m2       Patient is status post general anesthesia for Procedure(s):  CYSTOSCOPY BILATERAL RETROGRADE, RIGHT URETEROSCOPY [facility]. Nausea/Vomiting: None    Postoperative hydration reviewed and adequate. Pain:  Pain Scale 1: Numeric (0 - 10) (08/14/17 1200)  Pain Intensity 1: 0 (08/14/17 1200)   Managed    Neurological Status:   Neuro (WDL): Exceptions to WDL (08/14/17 1108)  Neuro  Neurologic State: Drowsy (08/14/17 1108)  Orientation Level: Oriented X4 (08/14/17 1108)  Cognition: Follows commands (08/14/17 1108)  Speech: Clear (08/14/17 1108)  Assessment L Pupil: Deferred (08/14/17 0841)  Assessment R Pupil: Deferred (08/14/17 0841)  LUE Motor Response: Purposeful (08/14/17 1108)  LLE Motor Response: Purposeful (08/14/17 1108)  RUE Motor Response: Purposeful (08/14/17 1108)  RLE Motor Response: Purposeful (08/14/17 1108)   At baseline    Mental Status and Level of Consciousness: Arousable    Pulmonary Status:   O2 Device: Room air (08/14/17 1200)   Adequate oxygenation and airway patent    Complications related to anesthesia: None    Post-anesthesia assessment completed.  No concerns    Signed By: Clare Mendez MD     August 14, 2017

## 2017-08-14 NOTE — IP AVS SNAPSHOT
[address] [facility] 
[phone] Patient: Tara Don MRN: [id number] :1965 Current Discharge Medication List  
  
START taking these medications Dose & Instructions Dispensing Information Comments Morning Noon Evening Bedtime  
 cephALEXin 500 mg capsule Commonly known as:  Lysbeth Beverage Your last dose was: Your next dose is:    
   
   
 Dose:  500 mg Take 1 Cap by mouth three (3) times daily for 5 days. Indications: PREVENTION OF BACTERIAL URINARY TRACT INFECTION Quantity:  15 Cap Refills:  0 HYDROcodone-acetaminophen 7.5-325 mg per tablet Commonly known as:  [place] Laity Replaces:  HYDROcodone-acetaminophen 5-325 mg per tablet Your last dose was: Your next dose is:    
   
   
 Dose:  1 Tab Take 1 Tab by mouth every six (6) hours as needed for Pain. Max Daily Amount: 4 Tabs. Quantity:  20 Tab Refills:  0 CONTINUE these medications which have NOT CHANGED Dose & Instructions Dispensing Information Comments Morning Noon Evening Bedtime  
 ascorbic acid (vitamin C) 500 mg tablet Commonly known as:  VITAMIN C Your last dose was: Your next dose is:    
   
   
 Dose:  500 mg Take 500 mg by mouth daily. Refills:  0 Biotin 2,500 mcg Cap Your last dose was: Your next dose is:    
   
   
 Dose:  1 Cap Take 1 Cap by mouth daily. Refills:  0  
     
   
   
   
  
 CALCIUM CITRATE + D PO Your last dose was: Your next dose is: Take  by mouth. Refills:  0  
     
   
   
   
  
 cyanocobalamin 1,000 mcg sublingual tablet Commonly known as:  VITAMIN B-12 Your last dose was: Your next dose is:    
   
   
 Dose:  1000 mcg Take 1,000 mcg by mouth daily. Refills:  0  
     
   
   
   
  
 EFFEXOR  mg capsule Generic drug:  venlafaxine-SR Your last dose was: Your next dose is: Take  by mouth daily. Refills:  0  
     
   
   
   
  
 ketorolac 10 mg tablet Commonly known as:  TORADOL Your last dose was: Your next dose is:    
   
   
 Dose:  10 mg Take 1 Tab by mouth every six (6) hours as needed for Pain. Quantity:  20 Tab Refills:  0 MULTIVITAMIN PO Your last dose was: Your next dose is:    
   
   
 Dose:  1 Tab Take 1 Tab by mouth daily. USES CENTRUM Refills:  0  
     
   
   
   
  
 potassium citrate 10 mEq (1,080 mg) Samanta Oiler Commonly known as:  [place] Your last dose was: Your next dose is:    
   
   
 Dose:  10 mEq Take 10 mEq by mouth two (2) times a day. Refills:  0  
     
   
   
   
  
 tamsulosin 0.4 mg capsule Commonly known as:  FLOMAX Your last dose was: Your next dose is:    
   
   
 Dose:  0.4 mg Take 1 Cap by mouth daily. Quantity:  12 Cap Refills:  0  
     
   
   
   
  
 VITAMIN D3 1,000 unit Cap Generic drug:  cholecalciferol Your last dose was: Your next dose is: Take  by mouth daily. Refills:  0 VITRON-C 65 mg iron- 125 mg Tbec Generic drug:  iron,carbonyl-vitamin C Your last dose was: Your next dose is:    
   
   
 TK 1 T PO BID Refills:  2 STOP taking these medications HYDROcodone-acetaminophen 5-325 mg per tablet Commonly known as:  Roseanna Joseph Replaced by:  HYDROcodone-acetaminophen 7.5-325 mg per tablet Where to Get Your Medications Information on where to get these meds will be given to you by the nurse or doctor. ! Ask your nurse or doctor about these medications  
  cephALEXin 500 mg capsule HYDROcodone-acetaminophen 7.5-325 mg per tablet

## 2017-08-14 NOTE — OP NOTES
[address], [address]   OP NOTE       Name:  Pola Greenberg   MR#:  [id number]   :  1965   Account #:  [de-identified]    Surgery Date:  2017   Date of Adm:  2017       PREOPERATIVE DIAGNOSIS: Right distal ureteral stone. POSTOPERATIVE DIAGNOSIS: Right distal ureteral stone. PROCEDURES PERFORMED    1. Cystoscopy. 2. Bilateral retrograde pyelograms. 3. Ureteroscopic stone extraction with holmium laser lithotripsy on right   with insertion of right double-J stent. SURGEON: Anabella Mera MD    ANESTHESIA: General.    DESCRIPTION OF PROCEDURE: The patient was placed in the   dorsal lithotomy position and genitalia prepped and draped in standard   fashion for this procedure after general anesthesia had been achieved. Fluoroscopy positioned. A time-out was taken. A 21 scope passed   easily. There was an elevated right hemitrigone with a stone at the   right ureteral orifice that I attempted to grasp with a grasper, but was   not successful in achieving this. The rest of the bladder looked   perfectly fine. The bladder was drained. The ureteroscope was then   passed directly into the ureter and under direct vision using the   holmium laser. We broke the stone up into passable size pieces. I went   up the ureter to the kidney, and care home up, I encountered another   stone or blood clot. It was sequentially broken up with a laser fiber as   well. The ureter all the way up to the kidney was otherwise perfectly   fine, no more stones seen. Inspecting the ureter on the way down, all   the pieces washed out of the ureter. Nevertheless, I made the decision   to leave a stent because of swelling at the ureteral orifice. A 0.035   Glidewire was passed through the ureteroscope into the right ureter,   up to the kidney under fluoroscopy and left indwelling.  Over this, I   passed a 6 x 24 double-J stent with a long suture tag. The cystoscope   was introduced and the position of the distal portion of the stent was   checked and was found to be perfect. We retrograded the left side and   did not see any abnormality. The bladder was drained. Next, 2%   lidocaine urethral jelly was placed for patient comfort purposes. This   drain was shortened. The procedure was terminated. The patient   was awakened and transferred to recovery in satisfactory condition. No   complications. ESTIMATED BLOOD LOSS: Zero. SPECIMENS REMOVED: Stone fragments sent for analysis. IMPRESSION: Distal right ureteral stone. PLAN: Stent removal in 2 days. Fluids. Antibiotics. Will go from there.         Kin Atkins MD      [facility] / [facility]   D:  08/14/2017   11:09   T:  08/14/2017   12:18   Job #:  [id number]

## 2017-08-14 NOTE — ANESTHESIA PREPROCEDURE EVALUATION
Anesthetic History   No history of anesthetic complications            Review of Systems / Medical History  Patient summary reviewed, nursing notes reviewed and pertinent labs reviewed    Pulmonary                Comments: Sinusitis  BARNES   Neuro/Psych         Headaches, psychiatric history and dementia    Comments: Anxiety  Depression  Migraines Cardiovascular    Hypertension: well controlled              Exercise tolerance: >4 METS     GI/Hepatic/Renal         Renal disease: stones       Endo/Other        Obesity and arthritis  Pertinent negatives: No morbid obesity   Other Findings              Physical Exam    Airway  Mallampati: I  TM Distance: > 6 cm  Neck ROM: normal range of motion   Mouth opening: Normal     Cardiovascular  Regular rate and rhythm,  S1 and S2 normal,  no murmur, click, rub, or gallop             Dental    Dentition: Lower braces and Upper braces     Pulmonary  Breath sounds clear to auscultation               Abdominal  GI exam deferred       Other Findings            Anesthetic Plan    ASA: 2  Anesthesia type: general          Induction: Intravenous  Anesthetic plan and risks discussed with: Patient

## 2017-08-14 NOTE — IP AVS SNAPSHOT
[address] [facility] 
[phone] Patient: Fanny Membreno MRN: [id number] :1965 You are allergic to the following Allergen Reactions Other Medication Other (comments) Allergic to mold, pollen,cigarettes, causes burning eyes, headaches Recent Documentation Height Weight BMI OB Status Smoking Status 1.626 m 99.8 kg 37.77 kg/m2 Ablation Never Smoker Emergency Contacts Name Discharge Info Relation Home Work Mobile [facility] DISCHARGE CAREGIVER [3] Sister [23] [phone] Brain Amaya  Son [22]   [phone] About your hospitalization You were admitted on:  2017 You last received care in the:  [facility] PACU You were discharged on:  2017 Unit phone number:  [phone] Why you were hospitalized Your primary diagnosis was:  Not on File Providers Seen During Your Hospitalizations Provider Role Specialty Primary office phone Ana Flood MD Attending Provider Urology [phone] Your Primary Care Physician (PCP) Primary Care Physician Office Phone Office Fax Jitendra Álvarez [phone] Follow-up Information Follow up With Details Comments Contact Info Uzma Floyd MD   [address] [place] [address] [id number] [phone] Current Discharge Medication List  
  
START taking these medications Dose & Instructions Dispensing Information Comments Morning Noon Evening Bedtime  
 cephALEXin 500 mg capsule Commonly known as:  Estel Dio Your last dose was: Your next dose is:    
   
   
 Dose:  500 mg Take 1 Cap by mouth three (3) times daily for 5 days. Indications: PREVENTION OF BACTERIAL URINARY TRACT INFECTION Quantity:  15 Cap Refills:  0 HYDROcodone-acetaminophen 7.5-325 mg per tablet Commonly known as:  Katarzyna Bernarda Replaces:  HYDROcodone-acetaminophen 5-325 mg per tablet Your last dose was: Your next dose is:    
   
   
 Dose:  1 Tab Take 1 Tab by mouth every six (6) hours as needed for Pain. Max Daily Amount: 4 Tabs. Quantity:  20 Tab Refills:  0 CONTINUE these medications which have NOT CHANGED Dose & Instructions Dispensing Information Comments Morning Noon Evening Bedtime  
 ascorbic acid (vitamin C) 500 mg tablet Commonly known as:  VITAMIN C Your last dose was: Your next dose is:    
   
   
 Dose:  500 mg Take 500 mg by mouth daily. Refills:  0 Biotin 2,500 mcg Cap Your last dose was: Your next dose is:    
   
   
 Dose:  1 Cap Take 1 Cap by mouth daily. Refills:  0  
     
   
   
   
  
 CALCIUM CITRATE + D PO Your last dose was: Your next dose is: Take  by mouth. Refills:  0  
     
   
   
   
  
 cyanocobalamin 1,000 mcg sublingual tablet Commonly known as:  VITAMIN B-12 Your last dose was: Your next dose is:    
   
   
 Dose:  1000 mcg Take 1,000 mcg by mouth daily. Refills:  0  
     
   
   
   
  
 EFFEXOR  mg capsule Generic drug:  venlafaxine-SR Your last dose was: Your next dose is: Take  by mouth daily. Refills:  0  
     
   
   
   
  
 ketorolac 10 mg tablet Commonly known as:  TORADOL Your last dose was: Your next dose is:    
   
   
 Dose:  10 mg Take 1 Tab by mouth every six (6) hours as needed for Pain. Quantity:  20 Tab Refills:  0 MULTIVITAMIN PO Your last dose was: Your next dose is:    
   
   
 Dose:  1 Tab Take 1 Tab by mouth daily. USES CENTRUM Refills:  0  
     
   
   
   
  
 potassium citrate 10 mEq (1,080 mg) Bhavana Counts Commonly known as:  [place] Your last dose was: Your next dose is:    
   
   
 Dose:  10 mEq Take 10 mEq by mouth two (2) times a day. Refills:  0  
     
   
   
   
  
 tamsulosin 0.4 mg capsule Commonly known as:  FLOMAX Your last dose was: Your next dose is:    
   
   
 Dose:  0.4 mg Take 1 Cap by mouth daily. Quantity:  12 Cap Refills:  0  
     
   
   
   
  
 VITAMIN D3 1,000 unit Cap Generic drug:  cholecalciferol Your last dose was: Your next dose is: Take  by mouth daily. Refills:  0 VITRON-C 65 mg iron- 125 mg Tbec Generic drug:  iron,carbonyl-vitamin C Your last dose was: Your next dose is:    
   
   
 TK 1 T PO BID Refills:  2 STOP taking these medications HYDROcodone-acetaminophen 5-325 mg per tablet Commonly known as:  Beena Jiménez Replaced by:  HYDROcodone-acetaminophen 7.5-325 mg per tablet Where to Get Your Medications Information on where to get these meds will be given to you by the nurse or doctor. ! Ask your nurse or doctor about these medications  
  cephALEXin 500 mg capsule HYDROcodone-acetaminophen 7.5-325 mg per tablet Discharge Instructions You did great. There was a small stone in the tube on the right near the bladder that I removed. I did leave a stent. You may see blood in your urine which is okay and may have some burning and pressure when you urinate. The stent will come out in 2 days in the office. Call [phone] to make sure you have an appt. Also, call this number anytime for any problems or questions. Take flomax and pain medicine and antibiotics as directed. Drink plenty of fluids. Don Leon M.D. 
[phone] Laser Lithotripsy: What to Expect at [facility] Your Recovery Laser lithotripsy is a way to treat kidney stones.  This treatment uses a laser to break kidney stones into tiny pieces. For several hours after the procedure you may have a burning feeling when you urinate. You may feel the urge to go even if you don't need to. This feeling should go away within a day. Drinking a lot of water can help. Your doctor also may advise you to take medicine that numbs the burning. This medicine is called phenazopyridine. It is available by prescription and over the counter. Brand names include Pyridium and Uristat. Your doctor may prescribe an antibiotic. This will help prevent an infection. You may have some blood in your urine for 2 or 3 days. Your doctor may have placed a small tube inside one of your ureters. Ureters are the tubes that connect the kidneys to the bladder. The small tube the doctor may have placed is called a stent. It may help the stone fragments pass through your body. Your doctor may remove the stent in a few weeks. Most stone fragments that are not removed pass out of the body within 24 hours. But sometimes it can take many weeks. If you have a large stone, you may need to come back for more treatments. This care sheet gives you a general idea about how long it will take for you to recover. But each person recovers at a different pace. Follow the steps below to feel better as quickly as possible. How can you care for yourself at home? Activity · Rest as much as you need to after you go home. · You may do your regular activities. But avoid hard exercise or sports for about a week or until there is no blood in your urine. Diet · You can eat your normal diet after lithotripsy. · Continue to drink plenty of fluids, enough so that your urine is light yellow or clear like water. If you have kidney, heart, or liver disease and have to limit fluids, talk with your doctor before you increase the amount of fluids you drink. Medicines · Your doctor will tell you if and when you can restart your medicines.  He or she will also give you instructions about taking any new medicines. · If you take blood thinners, such as warfarin (Coumadin), clopidogrel (Plavix), or aspirin, be sure to talk to your doctor. He or she will tell you if and when to start taking those medicines again. Make sure that you understand exactly what your doctor wants you to do. · If you take medicine to stop the burning when you urinate, take it exactly as recommended. Call your doctor if you think you are having a problem with your medicine. This medicine may color your urine orange or red. This is normal. You will get more details on the specific medicine your doctor recommends. · If your doctor prescribed antibiotics, take them as directed. Do not stop taking them just because you feel better. You need to take the full course of antibiotics. · Be safe with medicines. Read and follow all instructions on the label. ¨ If the doctor gave you a prescription medicine for pain, take it as prescribed. ¨ If you are not taking a prescription pain medicine, ask your doctor if you can take acetaminophen (Tylenol). Do not take ibuprofen (Advil, Motrin) or naproxen (Aleve) or similar medicines unless your doctor tells you to. ¨ Do not take two or more pain medicines at the same time unless the doctor told you to. Many pain medicines have acetaminophen, which is Tylenol. Too much acetaminophen (Tylenol) can be harmful. Heat · Take a warm bath. This may soothe the burning. Other instructions · Urinate through the strainer the doctor gives you. Save any stone pieces, including those that look like sand or gravel. Take these to your doctor. This will help your doctor find the cause of your stones. Follow-up care is a key part of your treatment and safety. Be sure to make and go to all appointments, and call your doctor if you are having problems. It's also a good idea to know your test results and keep a list of the medicines you take. When should you call for help? Call your doctor now or seek immediate medical care if: 
· You have severe pain that does not get better after you take pain medicine. · You have severe pain when you urinate. · You have a fever over 100°F. 
· You are not able to urinate within 6 to 8 hours after the procedure. · Your urine is still bright red 48 hours after the procedure. Watch closely for any changes in your health, and be sure to contact your doctor if: 
· You have pain or burning when you urinate. A burning sensation is normal for a day or two after the test, but call if it does not get better. · You have a frequent urge to urinate but can pass only small amounts of urine. · Your urine is pink or cloudy or smells bad. It is normal for the urine to have a pinkish color for 2 or 3 days after the test, but call if it does not get better. · You do not get better as expected. Where can you learn more? Go to http://florencia-maurice.info/. Enter Q239 in the search box to learn more about \"Laser Lithotripsy: What to Expect at Home. \" Current as of: April 3, 2017 Content Version: 11.3 © 1157-4415 [employer]. Care instructions adapted under license by [employer] (which disclaims liability or warranty for this information). If you have questions about a medical condition or this instruction, always ask your healthcare professional. [place] [address] any warranty or liability for your use of this information. DISCHARGE SUMMARY from Nurse The following personal items are in your possession at time of discharge: 
 
Dental Appliances: None Visual Aid: None Home Medications: None Jewelry: None Clothing: Other (comment) (street clothes) Other Valuables: None Personal Items Sent to Safe: none PATIENT INSTRUCTIONS: 
 
After general anesthesia or intravenous sedation, for 24 hours or while taking prescription Narcotics: · Limit your activities · Do not drive and operate hazardous machinery · Do not make important personal or business decisions · Do  not drink alcoholic beverages · If you have not urinated within 8 hours after discharge, please contact your surgeon on call. Report the following to your surgeon: 
· Excessive pain, swelling, redness or odor of or around the surgical area · Temperature over 100.5 · Nausea and vomiting lasting longer than 4 hours or if unable to take medications · Any signs of decreased circulation or nerve impairment to extremity: change in color, persistent  numbness, tingling, coldness or increase pain · Any questions What to do at Home: *  Please give a list of your current medications to your Primary Care Provider. *  Please update this list whenever your medications are discontinued, doses are 
    changed, or new medications (including over-the-counter products) are added. *  Please carry medication information at all times in case of emergency situations. These are general instructions for a healthy lifestyle: No smoking/ No tobacco products/ Avoid exposure to second hand smoke Surgeon General's Warning:  Quitting smoking now greatly reduces serious risk to your health. Obesity, smoking, and sedentary lifestyle greatly increases your risk for illness A healthy diet, regular physical exercise & weight monitoring are important for maintaining a healthy lifestyle You may be retaining fluid if you have a history of heart failure or if you experience any of the following symptoms:  Weight gain of 3 pounds or more overnight or 5 pounds in a week, increased swelling in our hands or feet or shortness of breath while lying flat in bed. Please call your doctor as soon as you notice any of these symptoms; do not wait until your next office visit. Recognize signs and symptoms of STROKE: 
 
F-face looks uneven A-arms unable to move or move unevenly S-speech slurred or non-existent T-time-call 911 as soon as signs and symptoms begin-DO NOT go Back to bed or wait to see if you get better-TIME IS BRAIN. Warning Signs of HEART ATTACK Call 911 if you have these symptoms: 
? Chest discomfort. Most heart attacks involve discomfort in the center of the chest that lasts more than a few minutes, or that goes away and comes back. It can feel like uncomfortable pressure, squeezing, fullness, or pain. ? Discomfort in other areas of the upper body. Symptoms can include pain or discomfort in one or both arms, the back, neck, jaw, or stomach. ? Shortness of breath with or without chest discomfort. ? Other signs may include breaking out in a cold sweat, nausea, or lightheadedness. Don't wait more than five minutes to call [address]! Fast action can save your life. Calling 911 is almost always the fastest way to get lifesaving treatment. Emergency Medical Services staff can begin treatment when they arrive  up to an hour sooner than if someone gets to the hospital by car. The discharge information has been reviewed with the patient and caregiver. The patient and caregiver verbalized understanding. Discharge medications reviewed with the patient and caregiver and appropriate educational materials and side effects teaching were provided. A common side effect of anesthesia following surgery is nausea and/or vomiting. In order to decrease symptoms, it is wise to avoid foods that are high in fat, greasy foods, milk products, and spicy foods for the first 24 hours. Acceptable foods for the first 24 hours following surgery include but are not limited to: 
 
? soup 
? broth 
?  toast  
? crackers ? applesauce 
? bananas  
? mashed potatoes, 
? soft or scrambled eggs 
? oatmeal 
?  jello It is important to eat when taking your pain medication. This will help to prevent nausea.  If possible, please try to time your meals with your medications. It is very important to stay hydrated following surgery. Sip fluids frequently while awake. Avoid acidic drinks such as citrus juices and soda for 24 hours. Carbonated beverages may cause bloating and gas. Acceptable fluids include: 
 
? water (flavor packets may add variety) ? coffee or tea (in moderation) ? Gatorade ? Marlene Kana ? apple juice 
? cranberry juice You are encouraged to cough and deep breathe every hour when awake. This will help to prevent respiratory complications following anesthesia. You may want to hug a pillow when coughing and sneezing to add additional support to the surgical area and to decrease discomfort if you had abdominal or chest surgery. If you are discharged home with support stockings, you may remove them after 24 hours. Support stockings are used to help prevent blood clots in the legs following surgery. Please take time to review all of your Home Care Instructions and Medication Information sheets provided in your discharge packet. If you have any questions, please contact your surgeons office. Thank you. Cephalexin (By mouth) Cephalexin ([id number]) Treats infections. This medicine is a cephalosporin antibiotic. Brand Name(s): Landon Blunt There may be other brand names for this medicine. When This Medicine Should Not Be Used: This medicine is not right for everyone. Do not use this medicine if you had an allergic reaction to cephalexin or another cephalosporin medicine. How to Use This Medicine:  
Capsule, Tablet, Tablet for Suspension, Liquid · Your doctor will tell you how much medicine to use. Do not use more than directed. · Read and follow the patient instructions that come with this medicine. Talk to your doctor or pharmacist if you have any questions. · You may take your medicine with food or milk to avoid stomach upset. · Oral liquid: Shake well just before use.  Measure the oral liquid medicine with a marked measuring spoon, oral syringe, or medicine cup. · Take all of the medicine in your prescription to clear up your infection, even if you feel better after the first few doses. · Missed dose: Take a dose as soon as you remember. If it is almost time for your next dose, wait until then and take a regular dose. Do not take extra medicine to make up for a missed dose. · Capsule or tablet: Store at room temperature away from heat, moisture, and direct light. · Oral liquid: Store in the refrigerator for 14 days. After 14 days, throw away any unused medicine. Do not freeze. Drugs and Foods to Avoid: Ask your doctor or pharmacist before using any other medicine, including over-the-counter medicines, vitamins, and herbal products. · Some medicines and foods can affect how cephalexin works. Tell your doctor if you are also using ¨ Metformin ¨ Probenecid Warnings While Using This Medicine: · Tell your doctor if you are pregnant or breastfeeding, or if you have kidney disease, liver disease, or a history of digestive problems, such as colitis. Tell your doctor if you are allergic to penicillin. · This medicine can cause diarrhea. Call your doctor if the diarrhea becomes severe, does not stop, or is bloody. Do not take any medicine to stop diarrhea until you have talked to your doctor. Diarrhea can occur 2 months or more after you stop taking this medicine. · Tell any doctor or dentist who treats you that you are using this medicine. This medicine may affect certain medical test results. · Call your doctor if your symptoms do not improve or if they get worse. · Keep all medicine out of the reach of children. Never share your medicine with anyone. Possible Side Effects While Using This Medicine:  
Call your doctor right away if you notice any of these side effects: · Allergic reaction: Itching or hives, swelling in your face or hands, swelling or tingling in your mouth or throat, chest tightness, trouble breathing · Blistering, peeling, red skin rash · Severe diarrhea, especially if bloody or ongoing · Severe stomach pain, vomiting If you notice these less serious side effects, talk with your doctor: · Mild diarrhea or nausea If you notice other side effects that you think are caused by this medicine, tell your doctor. Call your doctor for medical advice about side effects. You may report side effects to FDA at 8-349-IJF-9277 © 2017 [facility] Information is for End User's use only and may not be sold, redistributed or otherwise used for commercial purposes. The above information is an  only. It is not intended as medical advice for individual conditions or treatments. Talk to your doctor, nurse or pharmacist before following any medical regimen to see if it is safe and effective for you. Narcotic-Analgesic/Acetaminophen (By mouth) Relieves pain. Brand Name(s): [facility] w/Codeine, [place], Echt, [place], Lorcet HD, Lorcet Plus, Lortab 10/325, Lortab 5/325, Lortab 7.5/325, [employer], Tiarno Di Sopra, [place], [place], [facility], listedplaces There may be other brand names for this medicine. When This Medicine Should Not Be Used: You should not use this medicine if you have had an allergic reaction to acetaminophen, codeine, hydrocodone, propoxyphene, or sulfites. You should not use this medicine if you have had an allergic reaction to any other opioid pain medicine. How to Use This Medicine:  
Liquid, Tablet, Capsule · Your doctor will tell you how much medicine to use. Do not use more than directed. · This medicine contains acetaminophen. Read the labels of all other medicines you are using to see if they also contain acetaminophen, or ask your doctor or pharmacist. Mari Adel not use more than 4 grams (4,000 milligrams) total of acetaminophen in one day. · Drink plenty of liquids to help avoid constipation. If a dose is missed: · Some of these medicines need to be used on a fixed schedule. If you miss a dose or forget to use your medicine, call your doctor pharmacist for instructions. Do not use extra medicine to make up for a missed dose. How to Store and Dispose of This Medicine: · Store the medicine in a closed container at room temperature, away from heat, moisture, and direct light. Do not refrigerate or freeze the medicine. · Ask your pharmacist, doctor, or health caregiver about the best way to dispose of any outdated medicine or medicine no longer needed. · Keep all medicine out of the reach of children. Never share your medicine with anyone. Drugs and Foods to Avoid: Ask your doctor or pharmacist before using any other medicine, including over-the-counter medicines, vitamins, and herbal products. · Make sure your doctor knows if you are using a monoamine oxidase inhibitor (MAOI) medicine, such as Eldepryl®, Marplan®, [place], or Parnate®. Make sure your doctor knows if you are also using a medicine to treat depression, such as amitriptyline, doxepin, nortriptyline, Elavil®, Pamelor®, or Sinequan®. Make sure your doctor knows if you are taking an anticholinergic medicine, such as atropine, methscopolamine, or scopolamine. · Tell your doctor if you use anything else that makes you sleepy. Some examples are allergy medicine, narcotic pain medicine, and alcohol. · Do not drink alcohol while you are using this medicine. Warnings While Using This Medicine: · Make sure your doctor knows if you are pregnant or breast feeding, or if you have a head injury, or other conditions that may cause an increase in intercranial pressure (pressure inside your head). Make sure your doctor knows if you have severe kidney problems or severe liver problems, or if you have hypothyroidism (lack of thyroid function).  Make sure your doctor knows if you have Chaparro's disease (adrenal gland disease), or if you have enlarged prostate or urethral stricture. Make sure your doctor knows if you have any abdominal problems, or if you have lung disease or asthma. · This medicine may make you dizzy or drowsy. Avoid driving, using machines, or anything else that could be dangerous if you are not alert. · This medicine can be habit-forming. Do not use more than your prescribed dose. Call your doctor if you think your medicine is not working. · Tell any doctor or dentist who treats you that you are using this medicine. This medicine may affect certain medical test results. · This medicine may cause constipation, especially with long-term use. Ask your doctor if you should use a laxative to prevent and treat constipation. · When a mother is breastfeeding and takes codeine, there is a very small chance that this medicine could cause serious side effects in the baby. This is because codeine works differently in a few women, so their breast milk contains too much medicine. If you take codeine, be alert for these signs of overdose in your nursing baby: sleeping more than usual, trouble breastfeeding, trouble breathing, or being limp and weak. Call the baby's doctor right away if you think there is a problem. If you cannot talk to the doctor, take the baby to the emergency room or call 911. Possible Side Effects While Using This Medicine:  
Call your doctor right away if you notice any of these side effects: · Allergic reaction: Itching or hives, swelling in your face or hands, swelling or tingling in your mouth or throat, chest tightness, trouble breathing · Dizziness. · Seeing or hearing things that are not there. · Very slow heartbeat. If you notice these less serious side effects, talk with your doctor: · Change in how much or how often you urinate. · Cold, clammy skin. · Feeling unusually anxious, excited, fearful, or tired. · Nausea, vomiting, constipation, stomach pain or upset, or heartburn. · Skin rash. · Vision changes. If you notice other side effects that you think are caused by this medicine, tell your doctor. Call your doctor for medical advice about side effects. You may report side effects to FDA at 8-135-GEP-4527 © 2017 260Valeri Andres Information is for End User's use only and may not be sold, redistributed or otherwise used for commercial purposes. The above information is an  only. It is not intended as medical advice for individual conditions or treatments. Talk to your doctor, nurse or pharmacist before following any medical regimen to see if it is safe and effective for you. Discharge Orders None Introducing [facility] SERVICES! Anna Lantigua introduces [employer] Navigation patient portal. Now you can access parts of your medical record, email your doctor's office, and request medication refills online. 1. In your internet browser, go to [email or web address] 2. Click on the First Time User? Click Here link in the Sign In box. You will see the New Member Sign Up page. 3. Enter your Hammer[employer] Navigation Access Code exactly as it appears below. You will not need to use this code after youve completed the sign-up process. If you do not sign up before the expiration date, you must request a new code. · Hammer[employer] Access Code: [email or web address] Expires: 9/3/2017  4:54 AM 
 
4. Enter the last four digits of your Social Security Number (xxxx) and Date of Birth (mm/dd/yyyy) as indicated and click Submit. You will be taken to the next sign-up page. 5. Create a Education Elementst ID. This will be your Hammerhead Navigation login ID and cannot be changed, so think of one that is secure and easy to remember. 6. Create a Hammerhead Navigation password. You can change your password at any time. 7. Enter your Password Reset Question and Answer. This can be used at a later time if you forget your password. 8. Enter your e-mail address.  You will receive e-mail notification when new information is available in Frilpt. 9. Click Sign Up. You can now view and download portions of your medical record. 10. Click the Download Summary menu link to download a portable copy of your medical information. If you have questions, please visit the Frequently Asked Questions section of the Human Demand website. Remember, Human Demand is NOT to be used for urgent needs. For medical emergencies, dial 911. Now available from your iPhone and Android! General Information Please provide this summary of care documentation to your next provider. Patient Signature:  ____________________________________________________________ Date:  ____________________________________________________________  
  
[facility] Provider Signature:  ____________________________________________________________ Date:  ____________________________________________________________

## 2017-08-18 ENCOUNTER — PATIENT OUTREACH (OUTPATIENT)
Dept: OTHER | Age: 52
End: 2017-08-18

## 2017-08-18 NOTE — PROGRESS NOTES
AARON follow up. Patient on with ED visit for kidney stones and outpatient procedure for stent placement. Attempted to contact patient for transitions of care services. Left discreet message on voicemail with this CM contact information. Will follow for [place] services.

## 2017-08-24 LAB
CA PHOS MFR STONE: 3 %
COLOR STONE: NORMAL
COM MFR STONE: 97 %
COMMENT, 519994: NORMAL
COMPOSITION, 120440: NORMAL
DISCLAIMER, STO32L: NORMAL
NIDUS STONE QL: NORMAL
SIZE STONE: NORMAL MM
WT STONE: 21 MG

## 2017-09-06 ENCOUNTER — PATIENT OUTREACH (OUTPATIENT)
Dept: OTHER | Age: 52
End: 2017-09-06

## 2017-09-06 NOTE — PROGRESS NOTES
AARON  Call attempt:      Patient on report as with discharge from ED visits / and outpt hospital stay - last event 08/14. Additional attempt to contact patient for transitions of care. Left discreet message on voicemail with this CM contact information. Unable to reach letters sent via  Mail  06/07 and 08/07. Will continue to follow for one month for transitions of care needs.     Next outreach 09/20

## 2017-09-15 ENCOUNTER — PATIENT OUTREACH (OUTPATIENT)
Dept: OTHER | Age: 52
End: 2017-09-15

## 2017-09-15 NOTE — LETTER
9/15/2017 2:15 PM 
 
Ms. Catrina Coleman [address] [facility] [place] [address] [facility] [address] Dear Ms. Dio Chairez My name is Ingrid Mello , Employee Care Manager for [employer], and I have been trying to reach you. The Employee Care  is a free-of-charge, confidential service provided to our employees and their family members covered by the [employer]. Part of my job is to follow up with members who have recently been in the hospital or emergency room, to help them coordinate their care and answer questions they may have about their visit. Due to not being able to reach you within 30 days, I am closing out the current program, but will remain available to you should you have any questions. As healthcare providers, we know that patients do better when they have close follow up with a primary care provider (PCP), especially after a hospital or emergency department visit. If you do not have a PCP, I can help you find one that is convenient to you and covered by your insurance. I can also help you understand any after visit instructions, such as what symptoms to watch out for, or any new or changed medications. Remember that you can access your After Visit Summary by logging into your [employer] account. If you do not have a [employer] account, I can help you request access. Our program is designed to provide you with the opportunity to have a [employer] care manager partner with you for your healthcare needs. Please contact me at the below number if I can provide you with assistance for any of the above services.  
 
 
 
Sincerely, 
 
 
LAURA Giraldo RN, [place] [address], [address] [address]  
Phone:  [phone]     Fax:  [phone]    [email or web address]

## 2017-09-15 NOTE — PROGRESS NOTES
AARON  Wrap Up  Resolving current episode (Transitions of care complete). No further ED/UC or hospital admissions within 30 days post discharge from stent placement for kidney stone. Final attempt to contact patient for transitions of care. Left discreet message on voicemail with this CM contact information. No outreach from patient to [address] [place] [address] following multiple attempts to reach by phone and letters sent to offer CM services.

## 2017-10-03 ENCOUNTER — HOSPITAL ENCOUNTER (OUTPATIENT)
Dept: GENERAL RADIOLOGY | Age: 52
Discharge: HOME | End: 2017-10-03
Payer: COMMERCIAL

## 2017-10-03 DIAGNOSIS — N20.0 KIDNEY STONES: ICD-10-CM

## 2017-10-03 PROCEDURE — 74000 XR ABD (KUB): CPT

## 2018-01-19 ENCOUNTER — TELEPHONE (OUTPATIENT)
Dept: INTERNAL MEDICINE CLINIC | Age: 53
End: 2018-01-19

## 2018-01-19 RX ORDER — OSELTAMIVIR PHOSPHATE 75 MG/1
75 CAPSULE ORAL 2 TIMES DAILY
Qty: 10 CAP | Refills: 0 | Status: SHIPPED | OUTPATIENT
Start: 2018-01-19 | End: 2018-01-24

## 2018-02-26 ENCOUNTER — TELEPHONE (OUTPATIENT)
Dept: INTERNAL MEDICINE CLINIC | Age: 53
End: 2018-02-26

## 2018-02-26 DIAGNOSIS — R30.0 DYSURIA: Primary | ICD-10-CM

## 2018-02-26 DIAGNOSIS — R30.0 DYSURIA: ICD-10-CM

## 2018-02-26 NOTE — TELEPHONE ENCOUNTER
Pt with urinary symptoms and checked UA due to history kidney stones. No results found for this visit on 02/26/18. UA not resulted by nursing--not clearly infection (think only LE slightly (+))--follow-up culture. Addendum:  Culture not c/w infection.

## 2018-02-27 LAB — BACTERIA UR CULT: NORMAL

## 2018-04-19 ENCOUNTER — HOSPITAL ENCOUNTER (OUTPATIENT)
Dept: MAMMOGRAPHY | Age: 53
Discharge: HOME | End: 2018-04-19
Payer: COMMERCIAL

## 2018-04-19 DIAGNOSIS — Z12.39 SCREENING FOR BREAST CANCER: ICD-10-CM

## 2018-04-19 PROCEDURE — 77067 SCR MAMMO BI INCL CAD: CPT

## 2019-03-07 ENCOUNTER — HOSPITAL ENCOUNTER (OUTPATIENT)
Dept: ULTRASOUND IMAGING | Age: 54
Discharge: HOME | End: 2019-03-07
Payer: COMMERCIAL

## 2019-03-07 DIAGNOSIS — R10.2 PELVIC PAIN IN FEMALE: ICD-10-CM

## 2019-03-07 DIAGNOSIS — R10.84 GENERALIZED ABDOMINAL PAIN: ICD-10-CM

## 2019-03-07 PROCEDURE — 76700 US EXAM ABDOM COMPLETE: CPT

## 2019-03-07 PROCEDURE — 76830 TRANSVAGINAL US NON-OB: CPT

## 2019-03-07 PROCEDURE — 76856 US EXAM PELVIC COMPLETE: CPT

## 2019-09-06 ENCOUNTER — HOSPITAL ENCOUNTER (OUTPATIENT)
Dept: MAMMOGRAPHY | Age: 54
Discharge: HOME | End: 2019-09-06
Payer: COMMERCIAL

## 2019-09-06 DIAGNOSIS — Z12.39 SCREENING BREAST EXAMINATION: ICD-10-CM

## 2019-09-06 PROCEDURE — 77067 SCR MAMMO BI INCL CAD: CPT

## 2019-11-26 ENCOUNTER — HOSPITAL ENCOUNTER (OUTPATIENT)
Dept: PREADMISSION TESTING | Age: 54
Discharge: HOME | End: 2019-11-26
Payer: COMMERCIAL

## 2019-11-26 VITALS
OXYGEN SATURATION: 98 % | HEART RATE: 55 BPM | BODY MASS INDEX: 36.89 KG/M2 | DIASTOLIC BLOOD PRESSURE: 84 MMHG | WEIGHT: 216.06 LBS | RESPIRATION RATE: 16 BRPM | HEIGHT: 64 IN | SYSTOLIC BLOOD PRESSURE: 172 MMHG | TEMPERATURE: 98.1 F

## 2019-11-26 LAB
ALBUMIN SERPL-MCNC: 3.7 G/DL (ref 3.5–5)
ALBUMIN/GLOB SERPL: 1.2 {RATIO} (ref 1.1–2.2)
ALP SERPL-CCNC: 100 U/L (ref 45–117)
ALT SERPL-CCNC: 25 U/L (ref 12–78)
ANION GAP SERPL CALC-SCNC: 3 MMOL/L (ref 5–15)
APTT PPP: 25.3 SEC (ref 22.1–32)
AST SERPL-CCNC: 20 U/L (ref 15–37)
ATRIAL RATE: 54 BPM
BASOPHILS # BLD: 0 K/UL (ref 0–0.1)
BASOPHILS NFR BLD: 0 % (ref 0–1)
BILIRUB SERPL-MCNC: 0.3 MG/DL (ref 0.2–1)
BUN SERPL-MCNC: 15 MG/DL (ref 6–20)
BUN/CREAT SERPL: 20 (ref 12–20)
CALCIUM SERPL-MCNC: 9 MG/DL (ref 8.5–10.1)
CALCULATED P AXIS, ECG09: 27 DEGREES
CALCULATED R AXIS, ECG10: -22 DEGREES
CALCULATED T AXIS, ECG11: 0 DEGREES
CHLORIDE SERPL-SCNC: 109 MMOL/L (ref 97–108)
CO2 SERPL-SCNC: 31 MMOL/L (ref 21–32)
CREAT SERPL-MCNC: 0.74 MG/DL (ref 0.55–1.02)
DIAGNOSIS, 93000: NORMAL
DIFFERENTIAL METHOD BLD: NORMAL
EOSINOPHIL # BLD: 0.2 K/UL (ref 0–0.4)
EOSINOPHIL NFR BLD: 4 % (ref 0–7)
ERYTHROCYTE [DISTWIDTH] IN BLOOD BY AUTOMATED COUNT: 13.2 % (ref 11.5–14.5)
GLOBULIN SER CALC-MCNC: 3.1 G/DL (ref 2–4)
GLUCOSE SERPL-MCNC: 87 MG/DL (ref 65–100)
HCT VFR BLD AUTO: 38.4 % (ref 35–47)
HGB BLD-MCNC: 12 G/DL (ref 11.5–16)
IMM GRANULOCYTES # BLD AUTO: 0 K/UL (ref 0–0.04)
IMM GRANULOCYTES NFR BLD AUTO: 0 % (ref 0–0.5)
INR PPP: 1 (ref 0.9–1.1)
LYMPHOCYTES # BLD: 2 K/UL (ref 0.8–3.5)
LYMPHOCYTES NFR BLD: 43 % (ref 12–49)
MCH RBC QN AUTO: 27.3 PG (ref 26–34)
MCHC RBC AUTO-ENTMCNC: 31.3 G/DL (ref 30–36.5)
MCV RBC AUTO: 87.3 FL (ref 80–99)
MONOCYTES # BLD: 0.5 K/UL (ref 0–1)
MONOCYTES NFR BLD: 10 % (ref 5–13)
NEUTS SEG # BLD: 2.1 K/UL (ref 1.8–8)
NEUTS SEG NFR BLD: 43 % (ref 32–75)
NRBC # BLD: 0 K/UL (ref 0–0.01)
NRBC BLD-RTO: 0 PER 100 WBC
P-R INTERVAL, ECG05: 122 MS
PLATELET # BLD AUTO: 163 K/UL (ref 150–400)
PMV BLD AUTO: 10.3 FL (ref 8.9–12.9)
POTASSIUM SERPL-SCNC: 4.4 MMOL/L (ref 3.5–5.1)
PROT SERPL-MCNC: 6.8 G/DL (ref 6.4–8.2)
PROTHROMBIN TIME: 10.1 SEC (ref 9–11.1)
Q-T INTERVAL, ECG07: 426 MS
QRS DURATION, ECG06: 86 MS
QTC CALCULATION (BEZET), ECG08: 403 MS
RBC # BLD AUTO: 4.4 M/UL (ref 3.8–5.2)
SODIUM SERPL-SCNC: 143 MMOL/L (ref 136–145)
THERAPEUTIC RANGE,PTTT: NORMAL SECS (ref 58–77)
VENTRICULAR RATE, ECG03: 54 BPM
WBC # BLD AUTO: 4.8 K/UL (ref 3.6–11)

## 2019-11-26 PROCEDURE — 36415 COLL VENOUS BLD VENIPUNCTURE: CPT

## 2019-11-26 PROCEDURE — 85025 COMPLETE CBC W/AUTO DIFF WBC: CPT

## 2019-11-26 PROCEDURE — 85610 PROTHROMBIN TIME: CPT

## 2019-11-26 PROCEDURE — 93005 ELECTROCARDIOGRAM TRACING: CPT

## 2019-11-26 PROCEDURE — 85730 THROMBOPLASTIN TIME PARTIAL: CPT

## 2019-11-26 PROCEDURE — 80053 COMPREHEN METABOLIC PANEL: CPT

## 2019-11-26 RX ORDER — ELETRIPTAN HYDROBROMIDE 40 MG/1
40 TABLET, FILM COATED ORAL AS NEEDED
COMMUNITY

## 2019-11-26 RX ORDER — VENLAFAXINE HYDROCHLORIDE 75 MG/1
75 TABLET, EXTENDED RELEASE ORAL DAILY
COMMUNITY

## 2019-11-26 RX ORDER — PIMECROLIMUS 10 MG/G
CREAM TOPICAL AS NEEDED
COMMUNITY

## 2019-11-26 RX ORDER — IBUPROFEN 200 MG
400 TABLET ORAL AS NEEDED
COMMUNITY
End: 2021-07-29

## 2019-11-26 RX ORDER — CYANOCOBALAMIN (VITAMIN B-12) 1000 MCG
1 TABLET, EXTENDED RELEASE ORAL DAILY
COMMUNITY

## 2019-11-26 RX ORDER — AMPICILLIN TRIHYDRATE 250 MG
600 CAPSULE ORAL 2 TIMES DAILY
COMMUNITY
End: 2021-07-29

## 2019-11-26 RX ORDER — ACETAMINOPHEN 500 MG
TABLET ORAL DAILY
COMMUNITY

## 2019-11-26 RX ORDER — DIAPER,BRIEF,INFANT-TODD,DISP
1 EACH MISCELLANEOUS DAILY
COMMUNITY
End: 2021-11-05

## 2019-11-26 NOTE — PERIOP NOTES
[address] [address] [facility]   MAIN OR                                  [phone]   MAIN PRE OP                          [phone]                                                                                AMBULATORY PRE OP          [phone]  PRE-ADMISSION TESTING    [phone]   Surgery Date:   Thursday, December 5, 2019       Is surgery arrival time given by surgeon? NO  If NO, [facility] staff will call you between 3 and 7pm the day before your surgery with your arrival time. (If your surgery is on a Monday, we will call you the Friday before.)    Call [phone] after 7pm Monday-Friday if you did not receive this call. INSTRUCTIONS BEFORE YOUR SURGERY   When You  Arrive Arrive at the 2nd [address] [facility] on the day of your surgery  Have your insurance card, photo ID, and any copayment (if needed)   Food   and   Drink NO food or drink after midnight the night before surgery    This means NO water, gum, mints, coffee, juice, etc.  No alcohol (beer, wine, liquor) 24 hours before and after surgery   Medications to   TAKE   Morning of Surgery MEDICATIONS TO TAKE THE MORNING OF SURGERY WITH A SIP OF WATER:    Effexor   Medications  To  STOP      7 days before surgery  Non-Steroidal anti-inflammatory Drugs (NSAID's): for example, Ibuprofen (Advil, Motrin), Naproxen (Aleve)   Aspirin, if taking for pain    Herbal supplements, vitamins, and fish oil  (Pain medications not listed above, including Tylenol may be taken)   Blood  Thinners  If you take  Aspirin, Plavix, Coumadin, or any blood-thinning or anti-blood clot medicine, talk to the doctor who prescribed the medications for pre-operative instructions.    Bathing Clothing  Jewelry  Valuables      If you shower the morning of surgery, please do not apply anything to your skin (lotions, powders, deodorant, or makeup, especially mascara)   Follow Chlorhexidine Care Fusion body wash instructions provided to you during PAT appointment. Begin 3 days prior to surgery.  Do not shave or trim anywhere 24 hours before surgery   Wear your hair loose or down; no pony-tails, buns, or metal hair clips   Wear loose, comfortable, clean clothes   Wear glasses instead of contacts   Leave money, valuables, and jewelry, including body piercings, at home   Going Home - or Spending the Night  SAME-DAY SURGERY: You must have a responsible adult drive you home and stay with you 24 hours after surgery   ADMITS: If your doctor is keeping you in the hospital after surgery, leave personal belongings/luggage in your car until you have a hospital room number. Hospital discharge time is 12 noon  Drivers must be here before 12 noon unless you are told differently   Special Instructions   Special Instructions:  · Use Chlorhexidine Care Fusion wash and sponges 3 days prior to surgery as instructed. · Incentive spirometer given with instructions to practice at home and bring back to the hospital on the day of surgery. · Ensure/Glucerna  sample, nutritional information, and Ensure/Glucerna coupon given. · Pain pamphlet and Call Don't Fall reminder reviewed with patient. ·  parking is complimentary Monday - Friday 7 am - 5 pm  · Bring PTA Medication list day of surgery with the last doses taken documented     Follow all instructions so your surgery wont be cancelled. Please, be on time. If a situation occurs and you are delayed the day of surgery, call [phone]. If your physical condition changes (like a fever, cold, flu, etc.) call your surgeon. Home medication(s) reviewed and verified verbally and by list during PAT appointment. The patient was contacted  in person. The patient verbalizes understanding of all instructions and does not  need reinforcement.

## 2019-12-04 ENCOUNTER — ANESTHESIA EVENT (OUTPATIENT)
Dept: SURGERY | Age: 54
End: 2019-12-04
Payer: COMMERCIAL

## 2019-12-05 ENCOUNTER — HOSPITAL ENCOUNTER (OUTPATIENT)
Age: 54
Setting detail: OBSERVATION
Discharge: HOME | End: 2019-12-06
Attending: SURGERY | Admitting: SURGERY
Payer: COMMERCIAL

## 2019-12-05 ENCOUNTER — ANESTHESIA (OUTPATIENT)
Dept: SURGERY | Age: 54
End: 2019-12-05
Payer: COMMERCIAL

## 2019-12-05 PROBLEM — N62 MACROMASTIA: Status: ACTIVE | Noted: 2019-12-05

## 2019-12-05 PROCEDURE — 74011000250 HC RX REV CODE- 250: Performed by: PHYSICIAN ASSISTANT

## 2019-12-05 PROCEDURE — 88305 TISSUE EXAM BY PATHOLOGIST: CPT

## 2019-12-05 PROCEDURE — 74011250636 HC RX REV CODE- 250/636: Performed by: SURGERY

## 2019-12-05 PROCEDURE — 74011250636 HC RX REV CODE- 250/636: Performed by: NURSE ANESTHETIST, CERTIFIED REGISTERED

## 2019-12-05 PROCEDURE — 77030020262 HC SOL INJ SOD CL 0.9% 100ML: Performed by: SURGERY

## 2019-12-05 PROCEDURE — 74011250636 HC RX REV CODE- 250/636: Performed by: PHYSICIAN ASSISTANT

## 2019-12-05 PROCEDURE — 74011250636 HC RX REV CODE- 250/636: Performed by: ANESTHESIOLOGY

## 2019-12-05 PROCEDURE — 77030026438 HC STYL ET INTUB CARD -A: Performed by: ANESTHESIOLOGY

## 2019-12-05 PROCEDURE — 99218 HC RM OBSERVATION: CPT

## 2019-12-05 PROCEDURE — 77030013079 HC BLNKT BAIR HGGR 3M -A: Performed by: ANESTHESIOLOGY

## 2019-12-05 PROCEDURE — 77030039266 HC ADH SKN EXOFIN S2SG -A: Performed by: SURGERY

## 2019-12-05 PROCEDURE — 77030040506 HC DRN WND MDII -A: Performed by: SURGERY

## 2019-12-05 PROCEDURE — 74011250637 HC RX REV CODE- 250/637: Performed by: PHYSICIAN ASSISTANT

## 2019-12-05 PROCEDURE — 77030018836 HC SOL IRR NACL ICUM -A: Performed by: SURGERY

## 2019-12-05 PROCEDURE — 77030002916 HC SUT ETHLN J&J -A: Performed by: SURGERY

## 2019-12-05 PROCEDURE — 76010000131 HC OR TIME 2 TO 2.5 HR: Performed by: SURGERY

## 2019-12-05 PROCEDURE — 76060000035 HC ANESTHESIA 2 TO 2.5 HR: Performed by: SURGERY

## 2019-12-05 PROCEDURE — 77030028700 HC BLD TISS PLSM MEDT -E: Performed by: SURGERY

## 2019-12-05 PROCEDURE — 77030002986 HC SUT PROL J&J -A: Performed by: SURGERY

## 2019-12-05 PROCEDURE — 77030040361 HC SLV COMPR DVT MDII -B

## 2019-12-05 PROCEDURE — 77030002933 HC SUT MCRYL J&J -A: Performed by: SURGERY

## 2019-12-05 PROCEDURE — 76210000016 HC OR PH I REC 1 TO 1.5 HR: Performed by: SURGERY

## 2019-12-05 PROCEDURE — 77030020255 HC SOL INJ LR 1000ML BG: Performed by: SURGERY

## 2019-12-05 PROCEDURE — 74011000250 HC RX REV CODE- 250: Performed by: SURGERY

## 2019-12-05 PROCEDURE — 74011000250 HC RX REV CODE- 250: Performed by: NURSE ANESTHETIST, CERTIFIED REGISTERED

## 2019-12-05 PROCEDURE — 77030011640 HC PAD GRND REM COVD -A: Performed by: SURGERY

## 2019-12-05 PROCEDURE — 77030031139 HC SUT VCRL2 J&J -A: Performed by: SURGERY

## 2019-12-05 PROCEDURE — 77030008684 HC TU ET CUF COVD -B: Performed by: ANESTHESIOLOGY

## 2019-12-05 PROCEDURE — 77030019940 HC BLNKT HYPOTHRM STRY -B: Performed by: SURGERY

## 2019-12-05 PROCEDURE — 74011000258 HC RX REV CODE- 258: Performed by: SURGERY

## 2019-12-05 PROCEDURE — 77030040922 HC BLNKT HYPOTHRM STRY -A

## 2019-12-05 RX ORDER — ENOXAPARIN SODIUM 100 MG/ML
40 INJECTION SUBCUTANEOUS EVERY 24 HOURS
Status: DISCONTINUED | OUTPATIENT
Start: 2019-12-06 | End: 2019-12-06 | Stop reason: HOSPADM

## 2019-12-05 RX ORDER — ONDANSETRON 2 MG/ML
INJECTION INTRAMUSCULAR; INTRAVENOUS AS NEEDED
Status: DISCONTINUED | OUTPATIENT
Start: 2019-12-05 | End: 2019-12-05 | Stop reason: HOSPADM

## 2019-12-05 RX ORDER — VENLAFAXINE HYDROCHLORIDE 37.5 MG/1
75 CAPSULE, EXTENDED RELEASE ORAL DAILY
Status: DISCONTINUED | OUTPATIENT
Start: 2019-12-06 | End: 2019-12-06 | Stop reason: HOSPADM

## 2019-12-05 RX ORDER — HYDROMORPHONE HCL/0.9% NACL/PF 0.5 MG/ML
PLASTIC BAG, INJECTION (ML) INTRAVENOUS CONTINUOUS
Status: DISCONTINUED | OUTPATIENT
Start: 2019-12-05 | End: 2019-12-06

## 2019-12-05 RX ORDER — ACETAMINOPHEN 325 MG/1
650 TABLET ORAL
Status: DISCONTINUED | OUTPATIENT
Start: 2019-12-05 | End: 2019-12-06 | Stop reason: HOSPADM

## 2019-12-05 RX ORDER — GLYCOPYRROLATE 0.2 MG/ML
INJECTION INTRAMUSCULAR; INTRAVENOUS AS NEEDED
Status: DISCONTINUED | OUTPATIENT
Start: 2019-12-05 | End: 2019-12-05 | Stop reason: HOSPADM

## 2019-12-05 RX ORDER — DOCUSATE SODIUM 100 MG/1
100 CAPSULE, LIQUID FILLED ORAL 2 TIMES DAILY
Status: DISCONTINUED | OUTPATIENT
Start: 2019-12-05 | End: 2019-12-06 | Stop reason: HOSPADM

## 2019-12-05 RX ORDER — SODIUM CHLORIDE 9 MG/ML
75 INJECTION, SOLUTION INTRAVENOUS CONTINUOUS
Status: DISCONTINUED | OUTPATIENT
Start: 2019-12-05 | End: 2019-12-06

## 2019-12-05 RX ORDER — LIDOCAINE HYDROCHLORIDE 10 MG/ML
0.1 INJECTION, SOLUTION EPIDURAL; INFILTRATION; INTRACAUDAL; PERINEURAL AS NEEDED
Status: DISCONTINUED | OUTPATIENT
Start: 2019-12-05 | End: 2019-12-05 | Stop reason: HOSPADM

## 2019-12-05 RX ORDER — DIPHENHYDRAMINE HCL 25 MG
25 CAPSULE ORAL
Status: DISCONTINUED | OUTPATIENT
Start: 2019-12-05 | End: 2019-12-06 | Stop reason: HOSPADM

## 2019-12-05 RX ORDER — SODIUM CHLORIDE 0.9 % (FLUSH) 0.9 %
5-40 SYRINGE (ML) INJECTION EVERY 8 HOURS
Status: DISCONTINUED | OUTPATIENT
Start: 2019-12-05 | End: 2019-12-06 | Stop reason: HOSPADM

## 2019-12-05 RX ORDER — DEXAMETHASONE SODIUM PHOSPHATE 4 MG/ML
INJECTION, SOLUTION INTRA-ARTICULAR; INTRALESIONAL; INTRAMUSCULAR; INTRAVENOUS; SOFT TISSUE AS NEEDED
Status: DISCONTINUED | OUTPATIENT
Start: 2019-12-05 | End: 2019-12-05 | Stop reason: HOSPADM

## 2019-12-05 RX ORDER — SODIUM CHLORIDE 0.9 % (FLUSH) 0.9 %
5-40 SYRINGE (ML) INJECTION AS NEEDED
Status: DISCONTINUED | OUTPATIENT
Start: 2019-12-05 | End: 2019-12-06 | Stop reason: HOSPADM

## 2019-12-05 RX ORDER — FENTANYL CITRATE 50 UG/ML
INJECTION, SOLUTION INTRAMUSCULAR; INTRAVENOUS AS NEEDED
Status: DISCONTINUED | OUTPATIENT
Start: 2019-12-05 | End: 2019-12-05 | Stop reason: HOSPADM

## 2019-12-05 RX ORDER — HYDROMORPHONE HYDROCHLORIDE 1 MG/ML
.25-1 INJECTION, SOLUTION INTRAMUSCULAR; INTRAVENOUS; SUBCUTANEOUS
Status: DISCONTINUED | OUTPATIENT
Start: 2019-12-05 | End: 2019-12-05 | Stop reason: HOSPADM

## 2019-12-05 RX ORDER — MORPHINE SULFATE 2 MG/ML
2 INJECTION, SOLUTION INTRAMUSCULAR; INTRAVENOUS
Status: DISCONTINUED | OUTPATIENT
Start: 2019-12-05 | End: 2019-12-06 | Stop reason: HOSPADM

## 2019-12-05 RX ORDER — DIPHENHYDRAMINE HYDROCHLORIDE 50 MG/ML
12.5 INJECTION, SOLUTION INTRAMUSCULAR; INTRAVENOUS AS NEEDED
Status: DISCONTINUED | OUTPATIENT
Start: 2019-12-05 | End: 2019-12-05 | Stop reason: HOSPADM

## 2019-12-05 RX ORDER — MIDAZOLAM HYDROCHLORIDE 1 MG/ML
INJECTION, SOLUTION INTRAMUSCULAR; INTRAVENOUS AS NEEDED
Status: DISCONTINUED | OUTPATIENT
Start: 2019-12-05 | End: 2019-12-05 | Stop reason: HOSPADM

## 2019-12-05 RX ORDER — NALOXONE HYDROCHLORIDE 0.4 MG/ML
0.2 INJECTION, SOLUTION INTRAMUSCULAR; INTRAVENOUS; SUBCUTANEOUS
Status: DISCONTINUED | OUTPATIENT
Start: 2019-12-05 | End: 2019-12-05 | Stop reason: HOSPADM

## 2019-12-05 RX ORDER — NEOSTIGMINE METHYLSULFATE 1 MG/ML
INJECTION INTRAVENOUS AS NEEDED
Status: DISCONTINUED | OUTPATIENT
Start: 2019-12-05 | End: 2019-12-05 | Stop reason: HOSPADM

## 2019-12-05 RX ORDER — FLUMAZENIL 0.1 MG/ML
0.2 INJECTION INTRAVENOUS
Status: DISCONTINUED | OUTPATIENT
Start: 2019-12-05 | End: 2019-12-05 | Stop reason: HOSPADM

## 2019-12-05 RX ORDER — SUCCINYLCHOLINE CHLORIDE 20 MG/ML
INJECTION INTRAMUSCULAR; INTRAVENOUS AS NEEDED
Status: DISCONTINUED | OUTPATIENT
Start: 2019-12-05 | End: 2019-12-05 | Stop reason: HOSPADM

## 2019-12-05 RX ORDER — LIDOCAINE HYDROCHLORIDE 20 MG/ML
INJECTION, SOLUTION EPIDURAL; INFILTRATION; INTRACAUDAL; PERINEURAL AS NEEDED
Status: DISCONTINUED | OUTPATIENT
Start: 2019-12-05 | End: 2019-12-05 | Stop reason: HOSPADM

## 2019-12-05 RX ORDER — DIAZEPAM 5 MG/1
5 TABLET ORAL
Status: DISCONTINUED | OUTPATIENT
Start: 2019-12-05 | End: 2019-12-06 | Stop reason: HOSPADM

## 2019-12-05 RX ORDER — ONDANSETRON 4 MG/1
4 TABLET, ORALLY DISINTEGRATING ORAL
Status: DISCONTINUED | OUTPATIENT
Start: 2019-12-05 | End: 2019-12-06 | Stop reason: HOSPADM

## 2019-12-05 RX ORDER — PROPOFOL 10 MG/ML
INJECTION, EMULSION INTRAVENOUS AS NEEDED
Status: DISCONTINUED | OUTPATIENT
Start: 2019-12-05 | End: 2019-12-05 | Stop reason: HOSPADM

## 2019-12-05 RX ORDER — ROCURONIUM BROMIDE 10 MG/ML
INJECTION, SOLUTION INTRAVENOUS AS NEEDED
Status: DISCONTINUED | OUTPATIENT
Start: 2019-12-05 | End: 2019-12-05 | Stop reason: HOSPADM

## 2019-12-05 RX ORDER — SODIUM CHLORIDE, SODIUM LACTATE, POTASSIUM CHLORIDE, CALCIUM CHLORIDE 600; 310; 30; 20 MG/100ML; MG/100ML; MG/100ML; MG/100ML
125 INJECTION, SOLUTION INTRAVENOUS CONTINUOUS
Status: DISCONTINUED | OUTPATIENT
Start: 2019-12-05 | End: 2019-12-05 | Stop reason: HOSPADM

## 2019-12-05 RX ORDER — PROCHLORPERAZINE EDISYLATE 5 MG/ML
10 INJECTION INTRAMUSCULAR; INTRAVENOUS
Status: DISCONTINUED | OUTPATIENT
Start: 2019-12-05 | End: 2019-12-06 | Stop reason: HOSPADM

## 2019-12-05 RX ORDER — NALOXONE HYDROCHLORIDE 0.4 MG/ML
0.4 INJECTION, SOLUTION INTRAMUSCULAR; INTRAVENOUS; SUBCUTANEOUS AS NEEDED
Status: DISCONTINUED | OUTPATIENT
Start: 2019-12-05 | End: 2019-12-06 | Stop reason: HOSPADM

## 2019-12-05 RX ORDER — CLONIDINE HYDROCHLORIDE 0.1 MG/1
0.2 TABLET ORAL
Status: DISCONTINUED | OUTPATIENT
Start: 2019-12-05 | End: 2019-12-05

## 2019-12-05 RX ADMIN — SODIUM CHLORIDE, SODIUM LACTATE, POTASSIUM CHLORIDE, AND CALCIUM CHLORIDE: 600; 310; 30; 20 INJECTION, SOLUTION INTRAVENOUS at 07:00

## 2019-12-05 RX ADMIN — DOCUSATE SODIUM 100 MG: 100 CAPSULE ORAL at 17:06

## 2019-12-05 RX ADMIN — DOCUSATE SODIUM 100 MG: 100 CAPSULE ORAL at 11:11

## 2019-12-05 RX ADMIN — Medication 10 ML: at 13:40

## 2019-12-05 RX ADMIN — Medication 10 ML: at 22:33

## 2019-12-05 RX ADMIN — MIDAZOLAM 2 MG: 1 INJECTION INTRAMUSCULAR; INTRAVENOUS at 07:42

## 2019-12-05 RX ADMIN — DEXAMETHASONE SODIUM PHOSPHATE 8 MG: 4 INJECTION, SOLUTION INTRAMUSCULAR; INTRAVENOUS at 08:04

## 2019-12-05 RX ADMIN — GLYCOPYRROLATE 0.4 MG: 0.2 INJECTION, SOLUTION INTRAMUSCULAR; INTRAVENOUS at 09:18

## 2019-12-05 RX ADMIN — SUCCINYLCHOLINE CHLORIDE 100 MG: 20 INJECTION, SOLUTION INTRAMUSCULAR; INTRAVENOUS; PARENTERAL at 07:46

## 2019-12-05 RX ADMIN — GLYCOPYRROLATE 0.2 MG: 0.2 INJECTION, SOLUTION INTRAMUSCULAR; INTRAVENOUS at 08:15

## 2019-12-05 RX ADMIN — SODIUM CHLORIDE, SODIUM LACTATE, POTASSIUM CHLORIDE, AND CALCIUM CHLORIDE 125 ML/HR: 600; 310; 30; 20 INJECTION, SOLUTION INTRAVENOUS at 06:20

## 2019-12-05 RX ADMIN — LIDOCAINE HYDROCHLORIDE 100 MG: 20 INJECTION, SOLUTION INTRAVENOUS at 07:45

## 2019-12-05 RX ADMIN — WATER 2 G: 1 INJECTION INTRAMUSCULAR; INTRAVENOUS; SUBCUTANEOUS at 13:39

## 2019-12-05 RX ADMIN — FENTANYL CITRATE 50 MCG: 50 INJECTION INTRAMUSCULAR; INTRAVENOUS at 08:33

## 2019-12-05 RX ADMIN — ROCURONIUM BROMIDE 10 MG: 50 INJECTION, SOLUTION INTRAVENOUS at 07:45

## 2019-12-05 RX ADMIN — PROPOFOL 170 MG: 10 INJECTION, EMULSION INTRAVENOUS at 07:45

## 2019-12-05 RX ADMIN — NEOSTIGMINE METHYLSULFATE 3 MG: 1 INJECTION, SOLUTION INTRAVENOUS at 09:18

## 2019-12-05 RX ADMIN — ONDANSETRON 4 MG: 2 INJECTION INTRAMUSCULAR; INTRAVENOUS at 09:26

## 2019-12-05 RX ADMIN — Medication: at 09:53

## 2019-12-05 RX ADMIN — ONDANSETRON 4 MG: 2 INJECTION INTRAMUSCULAR; INTRAVENOUS at 08:04

## 2019-12-05 RX ADMIN — Medication: at 19:12

## 2019-12-05 RX ADMIN — FENTANYL CITRATE 150 MCG: 50 INJECTION INTRAMUSCULAR; INTRAVENOUS at 07:45

## 2019-12-05 RX ADMIN — ROCURONIUM BROMIDE 40 MG: 50 INJECTION, SOLUTION INTRAVENOUS at 08:00

## 2019-12-05 RX ADMIN — SODIUM CHLORIDE 75 ML/HR: 900 INJECTION, SOLUTION INTRAVENOUS at 09:53

## 2019-12-05 RX ADMIN — WATER 2 G: 1 INJECTION INTRAMUSCULAR; INTRAVENOUS; SUBCUTANEOUS at 07:55

## 2019-12-05 RX ADMIN — WATER 2 G: 1 INJECTION INTRAMUSCULAR; INTRAVENOUS; SUBCUTANEOUS at 22:33

## 2019-12-05 RX ADMIN — FENTANYL CITRATE 50 MCG: 50 INJECTION INTRAMUSCULAR; INTRAVENOUS at 09:30

## 2019-12-05 NOTE — PERIOP NOTES
TRANSFER - OUT REPORT:    Verbal report given to Amaris Lee on Elba Arias  being transferred to [facility](unit) for routine post - op       Report consisted of patients Situation, Background, Assessment and   Recommendations(SBAR). Information from the following report(s) SBAR, Kardex, OR Summary and MAR was reviewed with the receiving nurse. Lines:   Peripheral IV 12/05/19 Left Forearm (Active)   Site Assessment Clean, dry, & intact 12/5/2019  9:58 AM   Phlebitis Assessment 0 12/5/2019  9:58 AM   Infiltration Assessment 0 12/5/2019  9:58 AM   Dressing Status Clean, dry, & intact 12/5/2019  9:58 AM   Dressing Type Transparent 12/5/2019  9:58 AM   Hub Color/Line Status Infusing 12/5/2019  9:58 AM   Alcohol Cap Used Yes 12/5/2019  6:19 AM        Opportunity for questions and clarification was provided.       Patient transported with:   O2 @ 2 liters  Registered Nurse     Family notified

## 2019-12-05 NOTE — PROGRESS NOTES
CMS Note  12/05/2019    Patient received and signed the Observation letter. Patient was given a copy for their record.   Eneida Gilliam CMS

## 2019-12-05 NOTE — ANESTHESIA PREPROCEDURE EVALUATION
Relevant Problems   No relevant active problems       Anesthetic History   No history of anesthetic complications            Review of Systems / Medical History  Patient summary reviewed and pertinent labs reviewed    Pulmonary  Within defined limits                 Neuro/Psych         Psychiatric history (Depression)     Cardiovascular    Hypertension: well controlled              Exercise tolerance: >4 METS     GI/Hepatic/Renal                Endo/Other        Obesity and arthritis  Pertinent negatives: No morbid obesity   Other Findings              Physical Exam    Airway  Mallampati: II  TM Distance: 4 - 6 cm  Neck ROM: normal range of motion   Mouth opening: Normal     Cardiovascular    Rhythm: regular  Rate: normal         Dental  No notable dental hx       Pulmonary  Breath sounds clear to auscultation               Abdominal  GI exam deferred       Other Findings            Anesthetic Plan    ASA: 2  Anesthesia type: general          Induction: Intravenous  Anesthetic plan and risks discussed with: Patient

## 2019-12-05 NOTE — PROGRESS NOTES
Patient visited by [facility] Partner Volunteer on Medical Surgical Unit on 12/5/2019. Visited by Rev. Roni Davis MDiv, [facility], [facility] paging service: [phone])

## 2019-12-05 NOTE — H&P
History and Physical    Patient is a 62 y.o. well-developed, well nourished female who presents for bilateral breast reduction. Past Medical History:   Diagnosis Date    Arthritis     KNEES.  Depression     Hypertension     HX OF. NONE SINCE GBP IN 2011    Joint pain     Kidney stones 2017    Menopause     Pt states she has not had a period in a year.  Migraines     Morbid obesity (HCC)     LOST 80 PDS AFTER GBP SURGERY.  Psychiatric disorder     Anxiety    Sinusitis     Snoring      Past Surgical History:   Procedure Laterality Date    ENDOMETRIAL ABLATION, THERMAL      10 years ago    HX ACL RECONSTRUCTION  2/2011    RIGHT KNEE 2ND TIME    HX ACL RECONSTRUCTION  4/2010    RIGHT KNEE    HX CYST REMOVAL      Left ankle in 1999, right wrist 2008, and scalp    HX HEENT      Cyst removed from forehead    HX LAP CHOLECYSTECTOMY  10/18/13    by Dr Leigh Martell and 1995    carpal tunnel both hands    HX OTHER SURGICAL      CYST REMOVED L ANKLE, R WRIST    HX TONSILLECTOMY      childhood    HX TUBAL LIGATION  1993    HX UROLOGICAL  2017    Kidney stone surgically removed    HX WISDOM TEETH EXTRACTION  1982    AK LAP GASTRIC BYPASS/BREANNA-EN-Y  09/21/11    liver biopsy,EGD Dr John Brown, [facility]     Prior to Admission medications    Medication Sig Start Date End Date Taking? Authorizing Provider   Venlafaxine-ER 24 HR (EFFEXOR-ER) 75 mg tr24 tablet Take 75 mg by mouth daily. Yes Provider, Historical   iron, carbonyl (FEOSOL) 45 mg tab Take 1 Tab by mouth daily. Yes Provider, Historical   docosahexanoic acid/epa (FISH OIL PO) Take 1,000 mg by mouth. Yes Provider, Historical   CALCIUM PO Take 1,200 mg by mouth daily. Yes Provider, Historical   cholecalciferol (VITAMIN D3) (2,000 UNITS /50 MCG) cap capsule Take  by mouth daily. Yes Provider, Historical   glucosa carson 2KCl/chondroitin carson (GLUCOSAMINE-CHONDROITIN 3X STR PO) Take 1 Tab by mouth two (2) times a day.    Yes Provider, Historical   biotin 10,000 mcg cap Take 1 Tab by mouth daily. Yes Provider, Historical   red yeast rice extract 600 mg cap Take 600 mg by mouth two (2) times a day. Yes Provider, Historical   eletriptan (RELPAX) 40 mg tablet Take 40 mg by mouth as needed for Pain (Migraines). may repeat in 2 hours if necessary   Yes Provider, Historical   pimecrolimus (ELIDEL) 1 % topical cream Apply  to affected area as needed. Yes Provider, Historical   ibuprofen (ADVIL) 200 mg tablet Take 400 mg by mouth as needed for Pain. Yes Provider, Historical   potassium citrate (UROCIT-K10) 10 mEq (1,080 mg) TbER Take 10 mEq by mouth daily. Yes Provider, Historical   cyanocobalamin (VITAMIN B-12) 1,000 mcg sublingual tablet Take 1,000 mcg by mouth daily. Yes Provider, Historical   ascorbic acid (VITAMIN C) 500 mg tablet Take 1,000 mg by mouth daily. Yes Provider, Historical   MULTIVITAMIN PO Take 1 Tab by mouth two (2) times a day. USES CENTRUM   Yes Provider, Historical     Allergies   Allergen Reactions    Other Medication Other (comments)     Allergic to mold, pollen,cigarettes, causes burning eyes, headaches       General:   No apparent distress. Heart:  Regular rate and rhythm without murmurs or rubs. Lungs:  Clear to ausculation bilaterally; no wheezes, rales or rhonchi. Patient is ready to go to surgery.     Renata Shah MD  12/5/2019

## 2019-12-05 NOTE — DISCHARGE INSTRUCTIONS
Breast Reduction Patient Instructions    Immediately Following Surgery:    After surgery you will rest quietly for the first 48 hours. You will be able to walk around the house and perform light daily activities; however, during this time, it is not at all unusual for you to feel some pain, soreness and pressure in the chest area. This will gradually subside and Dr. Pablo Jaimes will give you pain medication to relieve it. You must take the entire prescription of antibiotics. Be sure to lie on your back whenever you rest or sleep. Keep your head elevated for 72 hours after surgery as this will help with swelling. The surgery bra that you have been put in should be worn constantly during the day and at night. You may also wear a soft sports bra with light support instead of the surgery bra if preferred. You will then be allowed to shower two days after surgery. Cira Miguel Angel yourself everywhere, including the tapes and your incisions. Use mild soap and pat yourself dry and put the bra back on. This daily routine will help keep the incisions clean, and will promote wound healing. Do not submerge yourself in a bath, swimming pool, or whirlpool for two weeks. You will wear the sports bra for a total of two to three weeks after surgery. The small tape strips covering your incisions. These will remain in place for seven days and will peel off by themselves. A few patients react to the anesthetic after surgery with nausea and vomiting. This usually lasts less than 24 hours and should be treated with lots of fluids. Dr. Pablo Jaimes will prescribe nausea medicine for during your preoperative visit. The maximum swelling occurs at about three days and then begins to dramatically improve. Mild bruising typically resolves within 14 days. You should plan to be off work for 1-2 weeks, although this can vary from person to person.     Additional Post-Operative Instructions:    No heavy exercise or lifting for three weeks following surgery. For the first three days following surgery you should try to restrict your arm movements. Move your arms slowly and avoid sudden jerky movements of the chest and breast area. Keep your arms as close to your body as possible. Dr. Trini Hull encourages walking immediately after surgery. This activity will greatly minimize the risk of blood clots in your leg veins. Do not expose your breasts to the sun for eight weeks after surgery. Please notify Dr. Trini Hull if:   If your one breast becomes significantly larger than the other   If you develop significant bruising across the chest    If you experience a significant increase in pain in the breast after the pain has improved   If you develop a temperature above 101.5° F   If you develop redness (like a sunburn) around your incisions  If you need help or have any questions feel free to call Dr Trini Hull with your concerns. Dr. Trini Hull is on call 24 hours per day, seven days per week and has an answering service to forward calls. The quality of your cosmetic enhancement may be compromised if you fail to return for any scheduled post-op visits, or follow the pre- and post-operative instructions. Please dont hesitate to report any unusual or concerning changes. Our number is [phone].

## 2019-12-05 NOTE — PROGRESS NOTES
12/5/2019  3:11 PM    Reason for Admission: Bi-lateral breast reduction                      RRAT Score: 2                    Plan for utilizing home health: No hx of hh, does not anticipate needs       Current Advanced Directive/Advance Care Plan: Does not have one  Miki Gavin [phone]  SonLeanne Francis [phone]                        Transition of Care Plan:                    CM met with pt to complete assessment and discharge planning. Pt verified demographics. Pt lives alone in a one story home with about 5 steps to enter. Pt's sister Klaudia Barnes [phone])/FYL lives nearby, and are very supportive. PTA, pt was independent with all ADls, driving. Pt is employed full time. Pt has Rx coverage and uses [facility] in [place], [place] @ [address] [place] [address]  Pt's son Leydi Grayson) will provide transportation at discharge. CMS provided Obs letter. Care Management Interventions  PCP Verified by CM: Yes(Dr. Suma Polo, no NN)  Mode of Transport at Discharge:  Other (see comment)([facility] will transport)  Transition of Care Consult (CM Consult): Discharge Planning  Current Support Network: Lives Alone, Own Home  Confirm Follow Up Transport: Self  Plan discussed with Pt/Family/Caregiver: Yes  Discharge Location  Discharge Placement: Home with outpatient services    Coni Topete,  Care Management

## 2019-12-05 NOTE — ANESTHESIA POSTPROCEDURE EVALUATION
Procedure(s):  BILATERAL BREAST REDUCTION. general    Anesthesia Post Evaluation      Multimodal analgesia: multimodal analgesia not used between 6 hours prior to anesthesia start to PACU discharge  Patient location during evaluation: PACU  Patient participation: complete - patient participated  Level of consciousness: awake and alert  Pain score: 2  Pain management: satisfactory to patient  Airway patency: patent  Anesthetic complications: no  Cardiovascular status: acceptable  Respiratory status: acceptable  Hydration status: acceptable  Post anesthesia nausea and vomiting:  none      Vitals Value Taken Time   /76 12/5/2019 10:30 AM   Temp 36.8 °C (98.3 °F) 12/5/2019  9:48 AM   Pulse 76 12/5/2019 10:32 AM   Resp 20 12/5/2019 10:32 AM   SpO2 100 % 12/5/2019 10:32 AM   Vitals shown include unvalidated device data.

## 2019-12-06 VITALS
BODY MASS INDEX: 37.09 KG/M2 | DIASTOLIC BLOOD PRESSURE: 70 MMHG | OXYGEN SATURATION: 91 % | HEART RATE: 74 BPM | TEMPERATURE: 99.8 F | SYSTOLIC BLOOD PRESSURE: 114 MMHG | WEIGHT: 216.06 LBS | RESPIRATION RATE: 16 BRPM

## 2019-12-06 PROCEDURE — 74011250636 HC RX REV CODE- 250/636: Performed by: PHYSICIAN ASSISTANT

## 2019-12-06 PROCEDURE — 99218 HC RM OBSERVATION: CPT

## 2019-12-06 PROCEDURE — 74011000250 HC RX REV CODE- 250: Performed by: PHYSICIAN ASSISTANT

## 2019-12-06 PROCEDURE — 74011250637 HC RX REV CODE- 250/637: Performed by: PHYSICIAN ASSISTANT

## 2019-12-06 PROCEDURE — 94760 N-INVAS EAR/PLS OXIMETRY 1: CPT

## 2019-12-06 RX ORDER — HYDROMORPHONE HYDROCHLORIDE 2 MG/1
2 TABLET ORAL
Status: DISCONTINUED | OUTPATIENT
Start: 2019-12-06 | End: 2019-12-06 | Stop reason: HOSPADM

## 2019-12-06 RX ADMIN — ENOXAPARIN SODIUM 40 MG: 40 INJECTION SUBCUTANEOUS at 09:41

## 2019-12-06 RX ADMIN — DOCUSATE SODIUM 100 MG: 100 CAPSULE ORAL at 09:41

## 2019-12-06 RX ADMIN — ACETAMINOPHEN 650 MG: 325 TABLET ORAL at 12:34

## 2019-12-06 RX ADMIN — Medication 10 ML: at 05:54

## 2019-12-06 RX ADMIN — WATER 2 G: 1 INJECTION INTRAMUSCULAR; INTRAVENOUS; SUBCUTANEOUS at 05:54

## 2019-12-06 RX ADMIN — VENLAFAXINE HYDROCHLORIDE 75 MG: 37.5 CAPSULE, EXTENDED RELEASE ORAL at 09:41

## 2019-12-06 NOTE — PROGRESS NOTES
PCP AARON appt scheduled with Dr. Danyel Hoskins on 12/16/2019 at 4:00pm. Appt added to AVS. Arianna Carrera CM Specialist

## 2019-12-06 NOTE — PROGRESS NOTES
12/6/2019  12:14 PM    EMR Review- Pt is set to discharge this date. No further CM needs noted for pt at this time. Pt's sonVishal will provide transportation. PCP appt scheduled for 12/16. Care Management Interventions  PCP Verified by CM: Yes(Dr. Sneha Morris, no NN)  Mode of Transport at Discharge:  Other (see comment)([facility] will transport)  Transition of Care Consult (CM Consult): Discharge Planning  Current Support Network: Lives Alone, Own Home  Confirm Follow Up Transport: Self  Plan discussed with Pt/Family/Caregiver: Yes  Discharge Location  Discharge Placement: Home with outpatient services    [facility] [place] 2[place]  Care Management

## 2019-12-06 NOTE — PROGRESS NOTES
Bedside and Verbal shift change report given to herman moon  (oncoming nurse) by Farideh Cohen  (offgoing nurse). Report included the following information SBAR and Kardex.

## 2019-12-06 NOTE — PROGRESS NOTES
Discharge order noted. Will follow up with primary nurse about discontinued PCA, pain toleration and ability to take PO.    0915: Patient continues with PCA. Pt eating breakfast, denies nausea. Patient reports her son will drive her home, but he is coming from [place] after noon today. Drain emptying teaching done. Patient provided with safety pins and specimen cup to measure output. 1250: Patient tolerating food and pain medication. Patient given discharge instructions. Patient verbalizes understanding of discharge instructions and home care. Patient signed discharge instructions, patient states she wants to nap until her son arrives. PIV removed.

## 2019-12-06 NOTE — PROGRESS NOTES
Bedside and Verbal shift change report given to LAURA High (oncoming nurse) by Tony Rodriguez RN (offgoing nurse). Report included the following information SBAR, Kardex, Intake/Output, MAR and Recent Results.

## 2019-12-06 NOTE — PROGRESS NOTES
POD #1  Patient doing well. Pain controlled with PCA. Tolerating liquid diet. VSS. Afebrile  Breasts soft bilaterally. Incisions clean, dry, and intact. Nipples perfusing well, sensation intact bilaterally.    Drains Serosangiouness  1) D/C when ready  2) Advance diet  3) OOB  4) D/C PCA  5) NIKO drain care

## 2019-12-06 NOTE — OP NOTES
[facility] 79  OPERATIVE REPORT    Name:  Everardo Gomes  MR#:  [id number]  :  1965  ACCOUNT #:  [de-identified]  DATE OF SERVICE:  2019    PREOPERATIVE DIAGNOSIS:  Chronic back pain, shoulder pain, neck pain, inframammary to trigone bra strap grooving secondary to macromastia. POSTOPERATIVE DIAGNOSIS:  Chronic back pain, shoulder pain, neck pain, inframammary to trigone bra strap grooving secondary to macromastia. PROCEDURE PERFORMED:  1.  Bilateral breast reduction. 2.  Creation of inferior pedicle fasciocutaneous flap, right and left breast.    SURGEON:  Isra Freed MD    ASSISTANT:  Genoveva Oleary. Yeny Calvert PA-C. Due to the complexity of this procedure, surgical assistant was required. Yeny MENDOZA, assisted with the resection of redundant breast tissue, creation of the superior medial and inferior fasciocutaneous flaps, irrigation, hemostasis, drain placement, complex closure of the breast.    ANESTHESIA:  General endotracheal.    COMPLICATIONS:  None. COUNTS:  Correct x2. DISPOSITION:  Stable to PACU. SPECIMENS REMOVED:  1. Total excised tissue right breast, 995 g.  2.  Total excised tissue left breast, 925 g. IMPLANTS:  None. ESTIMATED BLOOD LOSS:  50 mL. DRAINS:  19-Maldivian James x2. BRIEF HISTORY:  The patient is a 47-year-old female with a long-term history of chronic back pain, shoulder pain, neck pain, inframammary to trigone bra strap grooving secondary to macromastia. She now presents for breast reduction. Bilateral breast reduction via Wise-pattern skin approach with superior medial pedicle nipple transposition and possible free nipple grafting is offered. The overall procedure, incisional approach, expected outcomes, and recovery were discussed.   The risks, benefits, and alternatives to the procedure including but not limited to bleeding, infection, damage to surrounding tissue structures, the need for revisional surgery, seroma, hematoma, skin flap loss, fat necrosis, partial or total loss of the nipple, partial or total loss of sensation to the nipple, hypertrophic scarring, and asymmetry were discussed. Postoperative cup size cannot be guaranteed. The patient agreed to proceed. PROCEDURE:  Preoperative markings were made with the patient in standing position. Informed consent was obtained. The patient was taken to the operating room, placed supine on the operating table. Sequential compression boots were placed. General endotracheal anesthesia was obtained. A lower body Nicki Hugger was placed. The chest was prepped and draped in the usual sterile fashion. The preoperative markings were injected with 40 mL of 0.25% lidocaine with 1:400,000 epinephrine. Bilateral circumareolar incisions were designed with the nipple on moderate stretch using a 38-mm cookie cutter. Bilateral inferior pedicles were designed along the meridian at the level of the inframammary fold, 6 cm in length, 5 cm wide. These were designed and created to improve lower pole contour and to reduce dead space at the triple point closure. Both breasts were then infused with 1000 mL of standard tumescent solution consisting of 1 liter of warm lactated Ringer's mixed with 1 ampule of epinephrine and 10 mL of 2% lidocaine plain using a Lamis infiltrating cannula. This was done to reduce intraoperative blood loss and to improve dissection using the plasma blade. The right circumareolar incision was made sharply. The vertical, horizontal, and inframammary incisions were made. The superior medial pedicle and inferior pedicles were incised and de-epithelialized. The inferior pedicle was dissected along its medial, lateral, and superior aspects directed onto the anterior chest wall fascia capturing underlying pectoral perforators. The medial, lateral, and inferior aspects of the superior medial pedicle was then dissected to the chest wall fascia.   The vertical and horizontal incisions were deepened with the plasma blade to the chest wall fascia raising a superiorly-based fasciocutaneous breast flap. The medial and lateral aspects of the inframammary wound were then deepened to the chest wall fascia. The medial, central, and lateral breast wedges were then removed en bloc with the plasma blade and passed off the operating table for pathology. The right side weighed 995 g. Hemostasis was secured throughout with 2-0 Vicryl stick ties and electrocautery. The entire wound bed was irrigated with 2 liters of triple antibiotic solution and #19-Wallisian James drain was brought through a lateral stab incision and secured to the skin with 3-0 nylon and placed within the wound bed. The superior fasciocutaneous breast flap was transposed inferiorly to its anatomic position and tailor tacked closed with a 0 Prolene key stitch and surgical staples. A 2 x 2 cm triangular skin dermal flap was created along the inframammary fold along the meridian. This was inset into the vertical incision in the V-Y fashion to reduce skin tension at the triple point closure. The inframammary wound was closed with running deep dermal 2-0 Monocryl and a running subcuticular 3-0 Monocryl. The vertical incision was closed with interrupted deep dermal 2-0 Monocryl and a running subcuticular 3-0 Monocryl. The nipple-areolar complex was brought through a new aperture and its correction was brought through a new aperture 8.0 cm superior to the inframammary fold along the meridian. The aperture was marked with a 38-mm cookie cutter, incised, and de-epithelialized. The nipple-areolar complex was brought through this new aperture in proper orientation without tension and inset with interrupted deep dermal 3-0 Monocryl and a running subcuticular 3-0 Monocryl. The same exact procedure was repeated on the contralateral left side for a total resection of 925 g. Symmetry and volume were satisfactory.   The wounds were perfusing well to pinprick and light touch at the end of the case. The wounds were then dressed with Dermabond, 4x4s, Medipore tape. A surgical bra was placed. Anesthesia was then discontinued. The patient extubated in the operating room having tolerated the procedure well. The needle, instrument, and laparotomy pad counts at the end of  the case were correct.       Alyce Cobb MD      [id number]  D:  12/06/2019 11:37  T:  12/06/2019 16:41  JOB #:  [id number]

## 2020-10-16 ENCOUNTER — HOSPITAL ENCOUNTER (OUTPATIENT)
Dept: MAMMOGRAPHY | Age: 55
Discharge: HOME | End: 2020-10-16
Payer: COMMERCIAL

## 2020-10-16 DIAGNOSIS — Z12.31 VISIT FOR SCREENING MAMMOGRAM: ICD-10-CM

## 2020-10-16 PROCEDURE — 77067 SCR MAMMO BI INCL CAD: CPT | Performed by: FAMILY MEDICINE

## 2021-07-29 ENCOUNTER — OFFICE VISIT (OUTPATIENT)
Dept: SURGERY | Age: 56
End: 2021-07-29
Payer: COMMERCIAL

## 2021-07-29 ENCOUNTER — TELEPHONE (OUTPATIENT)
Dept: SURGERY | Age: 56
End: 2021-07-29

## 2021-07-29 VITALS
BODY MASS INDEX: 38.79 KG/M2 | HEIGHT: 64 IN | DIASTOLIC BLOOD PRESSURE: 83 MMHG | WEIGHT: 227.2 LBS | OXYGEN SATURATION: 98 % | RESPIRATION RATE: 18 BRPM | HEART RATE: 62 BPM | SYSTOLIC BLOOD PRESSURE: 142 MMHG | TEMPERATURE: 98.2 F

## 2021-07-29 DIAGNOSIS — E66.01 SEVERE OBESITY (BMI 35.0-39.9) WITH COMORBIDITY (HCC): ICD-10-CM

## 2021-07-29 DIAGNOSIS — K91.2 POSTOPERATIVE INTESTINAL MALABSORPTION: Primary | ICD-10-CM

## 2021-07-29 DIAGNOSIS — R63.5 WEIGHT GAIN: ICD-10-CM

## 2021-07-29 DIAGNOSIS — R10.9 ABDOMINAL PAIN, LEFT LATERAL: ICD-10-CM

## 2021-07-29 DIAGNOSIS — K91.2 POSTOPERATIVE INTESTINAL MALABSORPTION: ICD-10-CM

## 2021-07-29 PROCEDURE — 99203 OFFICE O/P NEW LOW 30 MIN: CPT | Performed by: NURSE PRACTITIONER

## 2021-07-29 RX ORDER — OMEPRAZOLE 20 MG/1
20 CAPSULE, DELAYED RELEASE ORAL DAILY
Qty: 30 CAPSULE | Refills: 1 | Status: SHIPPED | OUTPATIENT
Start: 2021-07-29 | End: 2021-07-29 | Stop reason: SDUPTHER

## 2021-07-29 RX ORDER — OMEPRAZOLE 20 MG/1
20 CAPSULE, DELAYED RELEASE ORAL DAILY
Qty: 30 CAPSULE | Refills: 1 | Status: SHIPPED | OUTPATIENT
Start: 2021-07-29 | End: 2021-08-26

## 2021-07-29 RX ORDER — OMEPRAZOLE 20 MG/1
20 TABLET, DELAYED RELEASE ORAL DAILY
Qty: 30 TABLET | Refills: 0 | Status: SHIPPED | OUTPATIENT
Start: 2021-07-29 | End: 2021-07-29 | Stop reason: CLARIF

## 2021-07-29 NOTE — PROGRESS NOTES
Chief Complaint   Patient presents with    Abdominal Pain     9 + years s/p  gastric bypass  having dull ache pain on left side of abdomen. Lavon Torrez presents 9+ year status post laparoscopic gastric bypass for treatment of morbid obesity. She presents today with weight gain and wanting to get back on track as well as a dull ache type pain in the left side of her abdomen. The pain has been there off and on for 2 years. She feels like the pain is \"deep inside\". She has no associated nausea or vomiting. Sometimes it will be painful with a bowel movement. The pain does not interfere with her day-to-day activity nor does it awaken her from sleep. Sometimes she will have increased pain after eating but it does not stop her from eating. Again reports the pain is an ache and is just to the left of the umbilicus. She is not noticed a bulge in the area. She occasionally has hiccups  Denies vomiting  Bowels move 2-3 times a day and no black or bloody stools  She has had a colonoscopy which she says was normal  She has had an abdominal ultrasound which she also says with note was normal with the exception of fatty liver  She has been taking diclofenac recently and did a 7-day course for a foot injury. She was also taking ibuprofen off and on but says she has not had any in quite some time. She notes that she was having the pain prior to taking those medications. She is frustrated with her weight that her weight never got under 200 pounds. Preop weight  Major  Current    She is taking her bariatric vitamins and takes the iron every other day.   She has been drinking 1 V8 juice a day to help get her vitamins   She reports she has had labs with her primary care provider not long ago and \"they were all good\"  She is a regular blood donor and she just went this week and her she reports her hemoglobin was 14    Visit Vitals  BP (!) 142/83   Pulse 62   Temp 98.2 °F (36.8 °C) (Oral)   Resp 18   Ht 5' 4\" (1.626 m)   Wt 227 lb 3.2 oz (103.1 kg)   LMP 09/27/2013   SpO2 98%   BMI 39.00 kg/m²     Appears well  She is alert and oriented x3  Chest is clear to auscultation and palpation  Heart is regular rate and rhythm  Abdomen is soft, bowel sounds are present there are no appreciable masses or hernias and I cannot reproduce the pain  Extremities are without edema and she is ambulating independently      ICD-10-CM ICD-9-CM    1. Postoperative intestinal malabsorption  K91.2 579.3 XR UGI/BA SWALLOW W SM BOWEL      omeprazole (PRILOSEC) 20 mg capsule      DISCONTINUED: omeprazole (PRILOSEC OTC) 20 mg tablet   2. Severe obesity (BMI 35.0-39. 9) with comorbidity ([address]  E66.01 278.01 XR UGI/BA SWALLOW W SM BOWEL   3. Weight gain  R63.5 783.1    4. Abdominal pain, left lateral  R10.9 789.09 XR UGI/BA SWALLOW W SM BOWEL      omeprazole (PRILOSEC) 20 mg capsule      DISCONTINUED: omeprazole (PRILOSEC OTC) 20 mg tablet     9+ year status post laparoscopic gastric bypass for treatment of morbid obesity  DC NSAIDS   Advised to discontinue all NSAIDs and this is for life. She may use some of the topical anti-inflammatory as needed for joint pain  Empirically started her on omeprazole 20 mg p.o. daily  The pain that she is describing is quite vague and is been there for 2 years. I would like to get an upper GI with a small bowel follow-through to evaluate the area at the [address] anastomosis. She is quite adamant that this is discomfort in her intestines and not her pouch. I would like to see if the omeprazole does help at all and improve the situation. We will have her follow-up with a virtual visit after her upper GI with small bowel series with Dr. Davion Pérez so he can review and give his input  I have referred her to the metabolic weight loss center as she has been on phentermine in the past with [facility] weight loss and did well.   She is interested in doing some medication management for her obesity and her hunger  Reviewed bariatric vitamins  I will try and access her labs in the portal for review  Plan on follow-up with Dr. John Mota after the upper GI with small  Bowel  Eveleen Gess verbalized understanding and questions were answered to the best of my knowledge and ability. GI educational materials were provided. 38 minutes spent in face to face with Sarah Gess > 50% counseling.

## 2021-07-29 NOTE — PROGRESS NOTES
1. Have you been to the ER, urgent care clinic since your last visit? Hospitalized since your last visit? no    2. Have you seen or consulted any other health care providers outside of the [address] [place] [address] since your last visit? Include any pap smears or colon screening.  no

## 2021-07-29 NOTE — PATIENT INSTRUCTIONS
Central scheduling will be contacting you about arranging a date and time for the upper GI with small bowel follow-through. You can also contact them at the number below. Central Scheduling contact   [phone]     Plan on a follow-up with Dr. Cathy Santillan after the upper GI/small bowel study. You can arrange a virtual visit. Take the omeprazole daily. Make note if this improves your abdominal pain. NSAIDs are off limits for life. Avoid ALL NSAID'S, including:  Advil, Motrin, Aleve, BC Powders, Excedrin, Naproxen, Ibuprofen, Goodies, Voltaren, Mobic, Diclofenac, etc.      If you are given a new prescription for pain ask if it is a NSAID (non-steroidal antiinflammatory drug). If it is, you should not take after having gastric bypass. To make an appointment with the metabolic weight loss center (our medical weight loss that is part of our practice) you can call [phone]. They also offer virtual appointments.

## 2021-07-29 NOTE — TELEPHONE ENCOUNTER
Returned call to Ms Gilberto Butler verified all PHI. She needs the Rx for the omeprazole sent to the [facility] in [place]. no chills/no diarrhea/no dysuria/no hematuria/no fever/no abdominal distension/no blood in stool/no burning urination

## 2021-07-29 NOTE — TELEPHONE ENCOUNTER
Pt called and stated Rx was sent to wrong pharmacy. While on the phone with PT she stated it was to be sent to [address] [place] [address] and that paperwork was updated and turned in today for her appointment. I pulled up PT chart and saw that there were others listed and read them aloud to her. With her verbal consent I deleted those pharmacies and added the correct one. Pt would like for Rx to be sent to [facility].

## 2021-08-18 ENCOUNTER — TELEPHONE (OUTPATIENT)
Dept: SURGERY | Age: 56
End: 2021-08-18

## 2021-08-18 NOTE — TELEPHONE ENCOUNTER
Called Pt to reschedule her orientation for the medical weight loss program. Already moved her to the 26th of August, she will call back if that is not a good time.

## 2021-08-20 ENCOUNTER — HOSPITAL ENCOUNTER (OUTPATIENT)
Dept: GENERAL RADIOLOGY | Age: 56
Discharge: HOME | End: 2021-08-20
Attending: NURSE PRACTITIONER
Payer: COMMERCIAL

## 2021-08-20 DIAGNOSIS — K91.2 POSTOPERATIVE INTESTINAL MALABSORPTION: ICD-10-CM

## 2021-08-20 DIAGNOSIS — E66.01 SEVERE OBESITY (BMI 35.0-39.9) WITH COMORBIDITY (HCC): ICD-10-CM

## 2021-08-20 DIAGNOSIS — R10.9 ABDOMINAL PAIN, LEFT LATERAL: ICD-10-CM

## 2021-08-20 PROCEDURE — 74248 X-RAY SM INT F-THRU STD: CPT

## 2021-08-25 DIAGNOSIS — K91.2 POSTOPERATIVE INTESTINAL MALABSORPTION: ICD-10-CM

## 2021-08-25 DIAGNOSIS — R10.9 ABDOMINAL PAIN, LEFT LATERAL: ICD-10-CM

## 2021-08-26 DIAGNOSIS — E66.01 MORBID OBESITY (HCC): ICD-10-CM

## 2021-08-26 DIAGNOSIS — Z76.89 ENCOUNTER FOR WEIGHT MANAGEMENT: Primary | ICD-10-CM

## 2021-08-26 RX ORDER — OMEPRAZOLE 20 MG/1
CAPSULE, DELAYED RELEASE ORAL
Qty: 30 CAPSULE | Refills: 1 | Status: SHIPPED | OUTPATIENT
Start: 2021-08-26 | End: 2021-10-11 | Stop reason: ALTCHOICE

## 2021-09-09 ENCOUNTER — OFFICE VISIT (OUTPATIENT)
Dept: SURGERY | Age: 56
End: 2021-09-09
Payer: COMMERCIAL

## 2021-09-09 VITALS
BODY MASS INDEX: 38.76 KG/M2 | TEMPERATURE: 98.2 F | RESPIRATION RATE: 18 BRPM | HEART RATE: 61 BPM | OXYGEN SATURATION: 96 % | DIASTOLIC BLOOD PRESSURE: 85 MMHG | HEIGHT: 64 IN | WEIGHT: 227 LBS | SYSTOLIC BLOOD PRESSURE: 141 MMHG

## 2021-09-09 DIAGNOSIS — R10.32 LLQ ABDOMINAL PAIN: Primary | ICD-10-CM

## 2021-09-09 PROCEDURE — 99213 OFFICE O/P EST LOW 20 MIN: CPT | Performed by: SURGERY

## 2021-09-09 NOTE — PROGRESS NOTES
1. Have you been to the ER, urgent care clinic since your last visit? Hospitalized since your last visit? 8/3/21 Yes , [facility] for left foot pain    2. Have you seen or consulted any other health care providers outside of the [address] [place] [address] since your last visit? Include any pap smears or colon screening. 8/3/21 [facility].

## 2021-09-12 NOTE — PATIENT INSTRUCTIONS
Abdominal Pain: Care Instructions  Your Care Instructions     Abdominal pain has many possible causes. Some aren't serious and get better on their own in a few days. Others need more testing and treatment. If your pain continues or gets worse, you need to be rechecked and may need more tests to find out what is wrong. You may need surgery to correct the problem. Don't ignore new symptoms, such as fever, nausea and vomiting, urination problems, pain that gets worse, and dizziness. These may be signs of a more serious problem. Your doctor may have recommended a follow-up visit in the next 8 to 12 hours. If you are not getting better, you may need more tests or treatment. The doctor has checked you carefully, but problems can develop later. If you notice any problems or new symptoms, get medical treatment right away. Follow-up care is a key part of your treatment and safety. Be sure to make and go to all appointments, and call your doctor if you are having problems. It's also a good idea to know your test results and keep a list of the medicines you take. How can you care for yourself at home? · Rest until you feel better. · To prevent dehydration, drink plenty of fluids. Choose water and other caffeine-free clear liquids until you feel better. If you have kidney, heart, or liver disease and have to limit fluids, talk with your doctor before you increase the amount of fluids you drink. · If your stomach is upset, eat mild foods, such as rice, dry toast or crackers, bananas, and applesauce. Try eating several small meals instead of two or three large ones. · Wait until 48 hours after all symptoms have gone away before you have spicy foods, alcohol, and drinks that contain caffeine. · Do not eat foods that are high in fat. · Avoid anti-inflammatory medicines such as aspirin, ibuprofen (Advil, Motrin), and naproxen (Aleve). These can cause stomach upset.  Talk to your doctor if you take daily aspirin for another health problem. When should you call for help? Call 911 anytime you think you may need emergency care. For example, call if:    · You passed out (lost consciousness).     · You pass maroon or very bloody stools.     · You vomit blood or what looks like coffee grounds.     · You have new, severe belly pain. Call your doctor now or seek immediate medical care if:    · Your pain gets worse, especially if it becomes focused in one area of your belly.     · You have a new or higher fever.     · Your stools are black and look like tar, or they have streaks of blood.     · You have unexpected vaginal bleeding.     · You have symptoms of a urinary tract infection. These may include:  ? Pain when you urinate. ? Urinating more often than usual.  ? Blood in your urine.     · You are dizzy or lightheaded, or you feel like you may faint. Watch closely for changes in your health, and be sure to contact your doctor if:    · You are not getting better after 1 day (24 hours). Where can you learn more? Go to http://www.gray.com/  Enter Q704 in the search box to learn more about \"Abdominal Pain: Care Instructions. \"  Current as of: February 26, 2020               Content Version: 12.8  © 2006-2021 [employer]wise[employer]. Care instructions adapted under license by [employer] (which disclaims liability or warranty for this information). If you have questions about a medical condition or this instruction, always ask your healthcare professional. [place] [address] any warranty or liability for your use of this information.

## 2021-09-12 NOTE — PROGRESS NOTES
Subjective:      Kayla Nicholas is a 60 y.o. female presents for postop care 10 years following laparoscopic gastric bypass. Appetite is good. Eating a regular diet with intermittent left-sided abdominal pain. No reflux symptoms or regurgitation. She has plantar fasciitis, now in the right ankle immobilizer, and on steroids. She is concerned with recent weight regain. Objective:     Visit Vitals  BP (!) 141/85   Pulse 61   Temp 98.2 °F (36.8 °C) (Oral)   Resp 18   Ht 5' 4\" (1.626 m)   Wt 227 lb (103 kg)   LMP 09/27/2013   SpO2 96%   BMI 38.96 kg/m²       General:  alert, cooperative, no distress, appears stated age, morbidly obese   Abdomen:  Deferred   Incision:   Deferred     Assessment:     Left-sided abdominal pain, history of gastric bypass. Recent upper GI/small bowel follow-through revealed some dilated Mary limb new left abdomen. This may represent a component of partial small bowel obstruction as explanation for her left-sided abdominal pain. We discussed options to include additional imaging such as CT scan versus diagnostic laparoscopy. She prefers to proceed with CT abdomen/pelvis. Plan:     1. Continue current medications. 2.  Exercise as tolerated given plantar fasciitis. 3.  We will schedule for CT abdomen/pelvis. Follow-up at thereafter.

## 2021-09-15 ENCOUNTER — HOSPITAL ENCOUNTER (OUTPATIENT)
Dept: CT IMAGING | Age: 56
Discharge: HOME | End: 2021-09-15
Attending: SURGERY
Payer: COMMERCIAL

## 2021-09-15 DIAGNOSIS — R10.32 LLQ ABDOMINAL PAIN: ICD-10-CM

## 2021-09-15 PROCEDURE — 74177 CT ABD & PELVIS W/CONTRAST: CPT

## 2021-09-15 PROCEDURE — 74011000636 HC RX REV CODE- 636: Performed by: SURGERY

## 2021-09-15 PROCEDURE — 74011000250 HC RX REV CODE- 250: Performed by: SURGERY

## 2021-09-15 RX ORDER — BARIUM SULFATE 20 MG/ML
900 SUSPENSION ORAL ONCE
Status: COMPLETED | OUTPATIENT
Start: 2021-09-15 | End: 2021-09-15

## 2021-09-15 RX ADMIN — IOPAMIDOL 100 ML: 755 INJECTION, SOLUTION INTRAVENOUS at 20:39

## 2021-09-15 RX ADMIN — BARIUM SULFATE 900 ML: 20 SUSPENSION ORAL at 20:40

## 2021-09-21 ENCOUNTER — TRANSCRIBE ORDER (OUTPATIENT)
Dept: SCHEDULING | Age: 56
End: 2021-09-21

## 2021-09-21 DIAGNOSIS — Z12.31 SCREENING MAMMOGRAM FOR HIGH-RISK PATIENT: Primary | ICD-10-CM

## 2021-09-21 NOTE — PERIOP NOTES
1107-Handoff Report from Operating Room to PACU    Report received from [address] [place] [address] and [facility] CRNA regarding Cherie Valerio. Surgeon(s):  Jona Dickens MD  And Procedure(s) (LRB):  CYSTOSCOPY BILATERAL RETROGRADE, RIGHT URETEROSCOPY WITH HOLIUM LASER AND STENT (Bilateral)  confirmed   with allergies discussed. Anesthesia type, drugs, patient history, complications, estimated blood loss, vital signs, intake and output, and last pain medication, lines and temperature were reviewed. 1200-TRANSFER - OUT REPORT:    Verbal report given to Loc Strong RN(name) on Cherie Valerio  being transferred to phase II(unit) for routine post - op       Report consisted of patients Situation, Background, Assessment and   Recommendations(SBAR). Information from the following report(s) SBAR, Kardex, OR Summary, Procedure Summary, Intake/Output, MAR and Recent Results was reviewed with the receiving nurse. Opportunity for questions and clarification was provided.       Patient transported with:   [employer]
For dc home. Vswnl. Denies pain, nausea. Went over [employer] instructions w/pt and nephew including Rx and f/up. Verbalized understanding. dc'd home.
PACU CALLED AS CASE ENDED
5

## 2021-10-11 ENCOUNTER — OFFICE VISIT (OUTPATIENT)
Dept: CARDIOLOGY CLINIC | Age: 56
End: 2021-10-11
Payer: COMMERCIAL

## 2021-10-11 VITALS
RESPIRATION RATE: 16 BRPM | HEIGHT: 64 IN | OXYGEN SATURATION: 98 % | WEIGHT: 222 LBS | HEART RATE: 61 BPM | SYSTOLIC BLOOD PRESSURE: 136 MMHG | BODY MASS INDEX: 37.9 KG/M2 | DIASTOLIC BLOOD PRESSURE: 82 MMHG

## 2021-10-11 DIAGNOSIS — I10 PRIMARY HYPERTENSION: ICD-10-CM

## 2021-10-11 DIAGNOSIS — R94.31 ABNORMAL EKG: Primary | ICD-10-CM

## 2021-10-11 PROCEDURE — 99244 OFF/OP CNSLTJ NEW/EST MOD 40: CPT | Performed by: SPECIALIST

## 2021-10-11 RX ORDER — DICLOFENAC SODIUM 10 MG/G
GEL TOPICAL
COMMUNITY
Start: 2021-09-27

## 2021-10-11 NOTE — PROGRESS NOTES
HISTORY OF PRESENT ILLNESS  Edvin Hickman is a 60 y.o. female     SUMMARY:   Problem List  Date Reviewed: 10/11/2021        Codes Class Noted    Macromastia ICD-10-CM: N62  ICD-9-CM: 611.1  12/5/2019        Chronic cholecystitis ICD-10-CM: K81.1  ICD-9-CM: 575.11  9/26/2013        Elevated LFTs ICD-10-CM: R79.89  ICD-9-CM: 790.6  9/26/2013        Other and unspecified postsurgical nonabsorption (Chronic) ICD-10-CM: K91.2  ICD-9-CM: 579.3  7/26/2012        Abnormal EKG ICD-10-CM: R94.31  ICD-9-CM: 794.31  6/13/2011        Depression ICD-10-CM: F32. A  ICD-9-CM: 003  5/27/2011        Migraines ICD-10-CM: L36.768  ICD-9-CM: 346.90  Unknown    Overview Signed 5/26/2011  3:24 PM by Kannan Reynolds NP     Diagnosed 5 years ago. Sinusitis ICD-10-CM: J32.9  ICD-9-CM: 473.9  Unknown        Joint pain ICD-10-CM: M25.50  ICD-9-CM: 719.40  Unknown        Snoring ICD-10-CM: R06.83  ICD-9-CM: 786.09  Unknown    Overview Signed 5/26/2011  3:25 PM by Kannan Reynolds NP     Negative Sleep Study in 2009. Morbid obesity ([facility] [address] ICD-10-CM: E66.01  ICD-9-CM: 278.01  Unknown    Overview Signed 5/26/2011  3:16 PM by Kannan Reynolds NP     Overweight for past 27 years. Highest adult weight was 262 in 2011. Lowest adult weight was 115 at age 22. Hypertension ICD-10-CM: I10  ICD-9-CM: 401.9  5/26/2011    Overview Signed 5/26/2011  3:22 PM by Kannan Reynolds NP     Diagnosed in 2009.               Arthritis ICD-10-CM: M19.90  ICD-9-CM: 716.90  5/26/2011    Overview Signed 5/26/2011  3:22 PM by Kannan Reynolds NP     In bilateral knees             Chronic knee pain ICD-10-CM: M25.569, G89.29  ICD-9-CM: 719.46, 338.29  5/26/2011        Varicose veins ICD-10-CM: I83.90  ICD-9-CM: 454.9  5/26/2011    Overview Signed 5/26/2011  3:26 PM by Kannan Reynolds NP     Bilateral thighs             SOB (shortness of breath) on exertion ICD-10-CM: R06.02  ICD-9-CM: 786.05  5/26/2011              Current Outpatient Medications on File Prior to Visit   Medication Sig    diclofenac (VOLTAREN) 1 % gel APPLY 2 GRAMS TO THE AFFECTED AREA(S) BY TOPICAL ROUTE 4 TIMES PER DAY    Venlafaxine-ER 24 HR (EFFEXOR-ER) 75 mg tr24 tablet Take 75 mg by mouth daily.  iron, carbonyl (FEOSOL) 45 mg tab Take 1 Tab by mouth daily.  docosahexanoic acid/epa (FISH OIL PO) Take 1,000 mg by mouth.  CALCIUM PO Take 1,200 mg by mouth daily.  cholecalciferol (VITAMIN D3) (2,000 UNITS /50 MCG) cap capsule Take  by mouth daily.  glucosa carson 2KCl/chondroitin acrson (GLUCOSAMINE-CHONDROITIN 3X STR PO) Take 1 Tab by mouth two (2) times a day.  biotin 10,000 mcg cap Take 1 Tab by mouth daily.  eletriptan (RELPAX) 40 mg tablet Take 40 mg by mouth as needed for Pain (Migraines). may repeat in 2 hours if necessary    pimecrolimus (ELIDEL) 1 % topical cream Apply  to affected area as needed.  cyanocobalamin (VITAMIN B-12) 1,000 mcg sublingual tablet Take 1,000 mcg by mouth daily.  ascorbic acid (VITAMIN C) 500 mg tablet Take 1,000 mg by mouth daily.  MULTIVITAMIN PO Take 1 Tab by mouth two (2) times a day. USES CENTRUM     No current facility-administered medications on file prior to visit. CARDIOLOGY STUDIES TO DATE:  No specialty comments available. Chief Complaint   Patient presents with    New Patient     HPI :  She is self-referred for cardiac evaluation. About 10 years ago she was seen by cardiologist prior to gastric bypass surgery. She is always had some bradycardia and physicians and other providers have commented on that. Prior to her bypass surgery she had hypertension and was on cholesterol medications but has not needed them since. She is never smoked there is no history of diabetes and family history is negative for premature coronary disease.   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 She has no symptoms suggestive of angina or heart failure. CARDIAC ROS:   negative for chest pain, dyspnea, palpitations, syncope, orthopnea, paroxysmal nocturnal dyspnea, exertional chest pressure/discomfort, claudication, lower extremity edema    Family History   Problem Relation Age of Onset    Heart Disease Father     Heart Attack Father     Heart Failure Father     Diabetes Brother     Hypertension Brother     Elevated Lipids Brother     Diabetes Maternal Grandfather     Seizures Son     Arthritis-rheumatoid Sister     Heart Failure Paternal Grandmother     Breast Cancer Maternal Aunt         age unknown       Past Medical History:   Diagnosis Date    Arthritis     KNEES.  Depression     Hypertension     HX OF. NONE SINCE GBP IN 2011    Joint pain     Kidney stones 2017    Menopause     Pt states she has not had a period in a year.  Migraines     Morbid obesity (HCC)     LOST 80 PDS AFTER GBP SURGERY.  Psychiatric disorder     Anxiety    Sinusitis     Snoring        GENERAL ROS:  A comprehensive review of systems was negative except for that written in the HPI.     Visit Vitals  /82 (BP 1 Location: Right arm, BP Patient Position: Sitting, BP Cuff Size: Adult)   Pulse 61   Resp 16   Ht 5' 4\" (1.626 m)   Wt 222 lb (100.7 kg)   LMP 09/27/2013   SpO2 98%   BMI 38.11 kg/m²       Wt Readings from Last 3 Encounters:   10/11/21 222 lb (100.7 kg)   09/09/21 227 lb (103 kg)   07/29/21 227 lb 3.2 oz (103.1 kg)            BP Readings from Last 3 Encounters:   10/11/21 136/82   09/09/21 (!) 141/85   07/29/21 (!) 142/83       PHYSICAL EXAM  General appearance: alert, cooperative, no distress, appears stated age  Neurologic: Alert and oriented X 3  Neck: supple, symmetrical, trachea midline, no adenopathy, no carotid bruit and no JVD  Lungs: clear to auscultation bilaterally  Heart: regular rate and rhythm, S1, S2 normal, no murmur, click, rub or gallop  Extremities: extremities normal, atraumatic, no cyanosis or edema  Pulses: 2+ and symmetric    Lab Results   Component Value Date/Time    Cholesterol, total 199 09/08/2011 10:25 AM     ASSESSMENT :      Her bradycardia is not a concern and I reassured her in that regard. She has minimal cardiac risk factors and no worrisome symptoms. At this point she needs to continue to work on risk factor modification. We talked about symptoms that would prompt an urgent return visit here or a call to 911. At this point she needs no specific cardiac testing. current treatment plan is effective, no change in therapy  lab results and schedule of future lab studies reviewed with patient  reviewed diet, exercise and weight control    Encounter Diagnoses   Name Primary?  Abnormal EKG Yes    Primary hypertension      Orders Placed This Encounter    diclofenac (VOLTAREN) 1 % gel       Follow-up and Dispositions    · Return if symptoms worsen or fail to improve. Ashvin Wilkins MD  10/11/2021  Please note that this dictation was completed with [employer], the computer voice recognition software. Quite often unanticipated grammatical, syntax, homophones, and other interpretive errors are inadvertently transcribed by the computer software. Please disregard these errors. Please excuse any errors that have escaped final proofreading. Thank you.

## 2021-10-12 ENCOUNTER — VIRTUAL VISIT (OUTPATIENT)
Dept: SURGERY | Age: 56
End: 2021-10-12
Payer: COMMERCIAL

## 2021-10-12 DIAGNOSIS — R10.32 LEFT LOWER QUADRANT ABDOMINAL PAIN: Primary | ICD-10-CM

## 2021-10-12 PROCEDURE — 99441 PR PHYS/QHP TELEPHONE EVALUATION 5-10 MIN: CPT | Performed by: SURGERY

## 2021-10-12 NOTE — PROGRESS NOTES
1. Have you been to the ER, urgent care clinic since your last visit? Hospitalized since your last visit? No    2. Have you seen or consulted any other health care providers outside of the [address] [place] [address] since your last visit? Include any pap smears or colon screening. Yes, 10/5/21 Foot doctor.

## 2021-10-14 ENCOUNTER — TRANSCRIBE ORDER (OUTPATIENT)
Dept: REGISTRATION | Age: 56
End: 2021-10-14

## 2021-10-14 DIAGNOSIS — Z01.812 PRE-PROCEDURE LAB EXAM: Primary | ICD-10-CM

## 2021-10-19 NOTE — PROGRESS NOTES
I was in the office while conducting this encounter. Consent:  She and/or her healthcare decision maker is aware that this patient-initiated Telehealth encounter is a billable service, with coverage as determined by her insurance carrier. She is aware that she may receive a bill and has provided verbal consent to proceed: Yes    This virtual visit was conducted via [employer]. Pursuant to the emergency declaration under the [facility] [place] [address] waiver authority and the [employer] Act, this Virtual  Visit was conducted to reduce the patient's risk of exposure to COVID-19 and provide continuity of care for an established patient. Services were provided through a video synchronous discussion virtually to substitute for in-person clinic visit. Due to this being a TeleHealth evaluation, many elements of the physical examination are unable to be assessed. Total Time: minutes: 5-10 minutes. Subjective:      Dulce Maria Victor is a 60 y.o. female presents for reevaluation of left lower quadrant abdominal pain; she has history of laparoscopic gastric bypass. Since last encounter, pain, intermittent in nature, persists, without relation to oral intake, bowel habits, or position. Notes reflux symptoms but denies nausea, vomiting, fever, chills, or constipation. Objective:     Visit Vitals  LMP 09/27/2013       General:  alert, cooperative, no distress, appears stated age, morbidly obese   Abdomen:  Deferred   Incision:   Deferred     CT scan abdomen/pelvis: Gastric bypass anatomy without obvious abnormality. Assessment:     Abdominal pain, history of gastric bypass. Concern for internal hernia process as symptoms persist.  Recommended diagnostic laparoscopy to assess for internal hernia, adhesions contributing to her symptoms.   Technical aspects of procedure reviewed along with risks (to include but not limited to bleeding, wound infection, conversion open procedure, negative findings, recurrent/persistent symptoms. Also reviewed anticipated hospital course, postoperative diet, and activity restrictions. She understands and desires to proceed. All questions answered. Plan:     1. Continue current medications. 2.  Bariatric diet as tolerated. 3.  We will schedule for above procedure.

## 2021-11-03 NOTE — PERIOP NOTES
SCHED. PAT 11-05-21 @ 0800/COVID TEST: 11-5-21 @ 0930/COVID QUESTIONS ANSWERED/MODERNA ON 2-26-21/INSTRUCTED TO BRING MODERNA CARD TO [facility] APPT, BY CECILLE ROSA

## 2021-11-05 ENCOUNTER — HOSPITAL ENCOUNTER (OUTPATIENT)
Dept: PREADMISSION TESTING | Age: 56
Discharge: HOME | End: 2021-11-05
Payer: COMMERCIAL

## 2021-11-05 ENCOUNTER — HOSPITAL ENCOUNTER (OUTPATIENT)
Dept: GENERAL RADIOLOGY | Age: 56
Discharge: HOME | End: 2021-11-05
Attending: SURGERY
Payer: COMMERCIAL

## 2021-11-05 VITALS
TEMPERATURE: 98 F | HEIGHT: 64 IN | DIASTOLIC BLOOD PRESSURE: 80 MMHG | WEIGHT: 225.53 LBS | BODY MASS INDEX: 38.5 KG/M2 | SYSTOLIC BLOOD PRESSURE: 169 MMHG | RESPIRATION RATE: 20 BRPM | HEART RATE: 62 BPM

## 2021-11-05 DIAGNOSIS — Z01.812 PRE-PROCEDURE LAB EXAM: ICD-10-CM

## 2021-11-05 LAB
ALBUMIN SERPL-MCNC: 3.2 G/DL (ref 3.5–5)
ALBUMIN/GLOB SERPL: 0.9 {RATIO} (ref 1.1–2.2)
ALP SERPL-CCNC: 95 U/L (ref 45–117)
ALT SERPL-CCNC: 30 U/L (ref 12–78)
ANION GAP SERPL CALC-SCNC: 2 MMOL/L (ref 5–15)
APPEARANCE UR: ABNORMAL
AST SERPL-CCNC: 20 U/L (ref 15–37)
ATRIAL RATE: 56 BPM
BACTERIA URNS QL MICRO: ABNORMAL /HPF
BASOPHILS # BLD: 0 K/UL (ref 0–0.1)
BASOPHILS NFR BLD: 0 % (ref 0–1)
BILIRUB SERPL-MCNC: 0.3 MG/DL (ref 0.2–1)
BILIRUB UR QL: NEGATIVE
BUN SERPL-MCNC: 21 MG/DL (ref 6–20)
BUN/CREAT SERPL: 26 (ref 12–20)
CALCIUM SERPL-MCNC: 9.1 MG/DL (ref 8.5–10.1)
CALCULATED P AXIS, ECG09: 51 DEGREES
CALCULATED R AXIS, ECG10: -27 DEGREES
CALCULATED T AXIS, ECG11: 1 DEGREES
CHLORIDE SERPL-SCNC: 108 MMOL/L (ref 97–108)
CO2 SERPL-SCNC: 30 MMOL/L (ref 21–32)
COLOR UR: ABNORMAL
CREAT SERPL-MCNC: 0.82 MG/DL (ref 0.55–1.02)
DIAGNOSIS, 93000: NORMAL
DIFFERENTIAL METHOD BLD: NORMAL
EOSINOPHIL # BLD: 0.1 K/UL (ref 0–0.4)
EOSINOPHIL NFR BLD: 1 % (ref 0–7)
EPITH CASTS URNS QL MICRO: ABNORMAL /LPF
ERYTHROCYTE [DISTWIDTH] IN BLOOD BY AUTOMATED COUNT: 13.2 % (ref 11.5–14.5)
GLOBULIN SER CALC-MCNC: 3.4 G/DL (ref 2–4)
GLUCOSE SERPL-MCNC: 101 MG/DL (ref 65–100)
GLUCOSE UR STRIP.AUTO-MCNC: NEGATIVE MG/DL
HCT VFR BLD AUTO: 39.7 % (ref 35–47)
HGB BLD-MCNC: 12.2 G/DL (ref 11.5–16)
HGB UR QL STRIP: ABNORMAL
IMM GRANULOCYTES # BLD AUTO: 0 K/UL (ref 0–0.04)
IMM GRANULOCYTES NFR BLD AUTO: 0 % (ref 0–0.5)
KETONES UR QL STRIP.AUTO: NEGATIVE MG/DL
LEUKOCYTE ESTERASE UR QL STRIP.AUTO: ABNORMAL
LYMPHOCYTES # BLD: 1.3 K/UL (ref 0.8–3.5)
LYMPHOCYTES NFR BLD: 16 % (ref 12–49)
MAGNESIUM SERPL-MCNC: 2 MG/DL (ref 1.6–2.4)
MCH RBC QN AUTO: 27.7 PG (ref 26–34)
MCHC RBC AUTO-ENTMCNC: 30.7 G/DL (ref 30–36.5)
MCV RBC AUTO: 90.2 FL (ref 80–99)
MONOCYTES # BLD: 0.8 K/UL (ref 0–1)
MONOCYTES NFR BLD: 10 % (ref 5–13)
NEUTS SEG # BLD: 5.7 K/UL (ref 1.8–8)
NEUTS SEG NFR BLD: 73 % (ref 32–75)
NITRITE UR QL STRIP.AUTO: POSITIVE
NRBC # BLD: 0 K/UL (ref 0–0.01)
NRBC BLD-RTO: 0 PER 100 WBC
P-R INTERVAL, ECG05: 134 MS
PH UR STRIP: 5.5 [PH] (ref 5–8)
PLATELET # BLD AUTO: 200 K/UL (ref 150–400)
PMV BLD AUTO: 10.8 FL (ref 8.9–12.9)
POTASSIUM SERPL-SCNC: 4.6 MMOL/L (ref 3.5–5.1)
PROT SERPL-MCNC: 6.6 G/DL (ref 6.4–8.2)
PROT UR STRIP-MCNC: 100 MG/DL
Q-T INTERVAL, ECG07: 396 MS
QRS DURATION, ECG06: 86 MS
QTC CALCULATION (BEZET), ECG08: 382 MS
RBC # BLD AUTO: 4.4 M/UL (ref 3.8–5.2)
RBC #/AREA URNS HPF: ABNORMAL /HPF (ref 0–5)
SODIUM SERPL-SCNC: 140 MMOL/L (ref 136–145)
SP GR UR REFRACTOMETRY: 1.02 (ref 1–1.03)
UA: UC IF INDICATED,UAUC: ABNORMAL
UROBILINOGEN UR QL STRIP.AUTO: 1 EU/DL (ref 0.2–1)
VENTRICULAR RATE, ECG03: 56 BPM
WBC # BLD AUTO: 8 K/UL (ref 3.6–11)
WBC URNS QL MICRO: >100 /HPF (ref 0–4)

## 2021-11-05 PROCEDURE — 83735 ASSAY OF MAGNESIUM: CPT

## 2021-11-05 PROCEDURE — 85025 COMPLETE CBC W/AUTO DIFF WBC: CPT

## 2021-11-05 PROCEDURE — 81001 URINALYSIS AUTO W/SCOPE: CPT

## 2021-11-05 PROCEDURE — 87077 CULTURE AEROBIC IDENTIFY: CPT

## 2021-11-05 PROCEDURE — 71046 X-RAY EXAM CHEST 2 VIEWS: CPT

## 2021-11-05 PROCEDURE — 80053 COMPREHEN METABOLIC PANEL: CPT

## 2021-11-05 PROCEDURE — 36415 COLL VENOUS BLD VENIPUNCTURE: CPT

## 2021-11-05 PROCEDURE — 87186 SC STD MICRODIL/AGAR DIL: CPT

## 2021-11-05 PROCEDURE — U0005 INFEC AGEN DETEC AMPLI PROBE: HCPCS

## 2021-11-05 PROCEDURE — 93005 ELECTROCARDIOGRAM TRACING: CPT

## 2021-11-05 PROCEDURE — 87086 URINE CULTURE/COLONY COUNT: CPT

## 2021-11-05 RX ORDER — KETOROLAC TROMETHAMINE 10 MG/1
10 TABLET, FILM COATED ORAL AS NEEDED
COMMUNITY

## 2021-11-05 RX ORDER — POTASSIUM CITRATE 10 MEQ/1
10 TABLET, EXTENDED RELEASE ORAL 2 TIMES DAILY
COMMUNITY

## 2021-11-05 RX ORDER — TAMSULOSIN HYDROCHLORIDE 0.4 MG/1
0.4 CAPSULE ORAL
COMMUNITY

## 2021-11-05 RX ORDER — GLUCOSAMINE/CHONDR SU A SOD 750-600 MG
5000 TABLET ORAL DAILY
COMMUNITY

## 2021-11-07 LAB
BACTERIA SPEC CULT: ABNORMAL
CC UR VC: ABNORMAL
SARS-COV-2, XPLCVT: NOT DETECTED
SERVICE CMNT-IMP: ABNORMAL
SOURCE, COVRS: NORMAL

## 2021-11-08 ENCOUNTER — TELEPHONE (OUTPATIENT)
Dept: SURGERY | Age: 56
End: 2021-11-08

## 2021-11-08 RX ORDER — LEVOFLOXACIN 500 MG/1
500 TABLET, FILM COATED ORAL DAILY
Qty: 10 TABLET | Refills: 0 | Status: ON HOLD | OUTPATIENT
Start: 2021-11-08 | End: 2021-11-10

## 2021-11-08 RX ORDER — NITROFURANTOIN 25; 75 MG/1; MG/1
100 CAPSULE ORAL 2 TIMES DAILY
Qty: 10 CAPSULE | Refills: 0 | Status: SHIPPED | OUTPATIENT
Start: 2021-11-08 | End: 2021-11-13

## 2021-11-08 NOTE — TELEPHONE ENCOUNTER
I called the patient after speaking to Dr Bernardo about her positive Urine culture and he was sending in a prescription and she has been treated for it since Friday by her Urologist as she said she passed 2 kidney stones and a urine specimen was sent then. I told her to not take the one that Dr Bernardo just sent in as the [address] she is on is susceptible. I will make Dr Bernardo aware as well. Pt in agreement.

## 2021-11-08 NOTE — PERIOP NOTES
MESSAGE SENT THROUGH [facility] TO [facility] DR MURRAY'S NURSE FOR ABNORMAL URINE CULTURE RESULTS.  CHART GIVEN TO JOSE DUVALL.

## 2021-11-09 ENCOUNTER — ANESTHESIA EVENT (OUTPATIENT)
Dept: SURGERY | Age: 56
End: 2021-11-09
Payer: COMMERCIAL

## 2021-11-10 ENCOUNTER — ANESTHESIA (OUTPATIENT)
Dept: SURGERY | Age: 56
End: 2021-11-10
Payer: COMMERCIAL

## 2021-11-10 ENCOUNTER — HOSPITAL ENCOUNTER (OUTPATIENT)
Age: 56
Setting detail: OUTPATIENT SURGERY
Discharge: HOME | End: 2021-11-10
Attending: SURGERY | Admitting: SURGERY
Payer: COMMERCIAL

## 2021-11-10 VITALS
OXYGEN SATURATION: 98 % | SYSTOLIC BLOOD PRESSURE: 137 MMHG | BODY MASS INDEX: 38.28 KG/M2 | HEART RATE: 70 BPM | TEMPERATURE: 97.7 F | RESPIRATION RATE: 28 BRPM | DIASTOLIC BLOOD PRESSURE: 76 MMHG | WEIGHT: 223 LBS

## 2021-11-10 DIAGNOSIS — R10.32 ABDOMINAL PAIN, LLQ: ICD-10-CM

## 2021-11-10 PROCEDURE — 77030008684 HC TU ET CUF COVD -B: Performed by: ANESTHESIOLOGY

## 2021-11-10 PROCEDURE — 76010000149 HC OR TIME 1 TO 1.5 HR: Performed by: SURGERY

## 2021-11-10 PROCEDURE — 77030037032 HC INSRT SCIS CLICKLLINE DISP STOR -B: Performed by: SURGERY

## 2021-11-10 PROCEDURE — 77030039895 HC SYST SMK EVAC LAP COVD -B: Performed by: SURGERY

## 2021-11-10 PROCEDURE — 74011000250 HC RX REV CODE- 250: Performed by: NURSE ANESTHETIST, CERTIFIED REGISTERED

## 2021-11-10 PROCEDURE — 77030020829: Performed by: SURGERY

## 2021-11-10 PROCEDURE — 74011250636 HC RX REV CODE- 250/636: Performed by: NURSE ANESTHETIST, CERTIFIED REGISTERED

## 2021-11-10 PROCEDURE — 77030040955 HC DSCTR SONICISION MEDT -H1: Performed by: SURGERY

## 2021-11-10 PROCEDURE — 76210000016 HC OR PH I REC 1 TO 1.5 HR: Performed by: SURGERY

## 2021-11-10 PROCEDURE — 74011250636 HC RX REV CODE- 250/636: Performed by: SURGERY

## 2021-11-10 PROCEDURE — 2709999900 HC NON-CHARGEABLE SUPPLY: Performed by: SURGERY

## 2021-11-10 PROCEDURE — 77030010507 HC ADH SKN DERMBND J&J -B: Performed by: SURGERY

## 2021-11-10 PROCEDURE — 77030012770 HC TRCR OPT FX AMR -B: Performed by: SURGERY

## 2021-11-10 PROCEDURE — 74011000250 HC RX REV CODE- 250: Performed by: SURGERY

## 2021-11-10 PROCEDURE — 76210000020 HC REC RM PH II FIRST 0.5 HR: Performed by: SURGERY

## 2021-11-10 PROCEDURE — 44180 LAP ENTEROLYSIS: CPT | Performed by: SURGERY

## 2021-11-10 PROCEDURE — 76060000033 HC ANESTHESIA 1 TO 1.5 HR: Performed by: SURGERY

## 2021-11-10 PROCEDURE — 77030040361 HC SLV COMPR DVT MDII -B: Performed by: SURGERY

## 2021-11-10 PROCEDURE — 74011250637 HC RX REV CODE- 250/637: Performed by: ANESTHESIOLOGY

## 2021-11-10 PROCEDURE — 77030002933 HC SUT MCRYL J&J -A: Performed by: SURGERY

## 2021-11-10 PROCEDURE — 74011250636 HC RX REV CODE- 250/636: Performed by: ANESTHESIOLOGY

## 2021-11-10 PROCEDURE — 44180 LAP ENTEROLYSIS: CPT | Performed by: PHYSICIAN ASSISTANT

## 2021-11-10 RX ORDER — OXYCODONE AND ACETAMINOPHEN 5; 325 MG/1; MG/1
1 TABLET ORAL AS NEEDED
Status: DISCONTINUED | OUTPATIENT
Start: 2021-11-10 | End: 2021-11-11 | Stop reason: HOSPADM

## 2021-11-10 RX ORDER — ONDANSETRON 2 MG/ML
4 INJECTION INTRAMUSCULAR; INTRAVENOUS AS NEEDED
Status: DISCONTINUED | OUTPATIENT
Start: 2021-11-10 | End: 2021-11-11 | Stop reason: HOSPADM

## 2021-11-10 RX ORDER — SODIUM CHLORIDE 0.9 % (FLUSH) 0.9 %
5-40 SYRINGE (ML) INJECTION EVERY 8 HOURS
Status: DISCONTINUED | OUTPATIENT
Start: 2021-11-10 | End: 2021-11-11 | Stop reason: HOSPADM

## 2021-11-10 RX ORDER — SODIUM CHLORIDE, SODIUM LACTATE, POTASSIUM CHLORIDE, CALCIUM CHLORIDE 600; 310; 30; 20 MG/100ML; MG/100ML; MG/100ML; MG/100ML
125 INJECTION, SOLUTION INTRAVENOUS CONTINUOUS
Status: DISCONTINUED | OUTPATIENT
Start: 2021-11-10 | End: 2021-11-11 | Stop reason: HOSPADM

## 2021-11-10 RX ORDER — SODIUM CHLORIDE 0.9 % (FLUSH) 0.9 %
5-40 SYRINGE (ML) INJECTION AS NEEDED
Status: DISCONTINUED | OUTPATIENT
Start: 2021-11-10 | End: 2021-11-11 | Stop reason: HOSPADM

## 2021-11-10 RX ORDER — LIDOCAINE HYDROCHLORIDE 10 MG/ML
0.1 INJECTION, SOLUTION EPIDURAL; INFILTRATION; INTRACAUDAL; PERINEURAL AS NEEDED
Status: DISCONTINUED | OUTPATIENT
Start: 2021-11-10 | End: 2021-11-10 | Stop reason: HOSPADM

## 2021-11-10 RX ORDER — BUPIVACAINE HYDROCHLORIDE 5 MG/ML
INJECTION, SOLUTION EPIDURAL; INTRACAUDAL AS NEEDED
Status: DISCONTINUED | OUTPATIENT
Start: 2021-11-10 | End: 2021-11-10 | Stop reason: HOSPADM

## 2021-11-10 RX ORDER — MIDAZOLAM HYDROCHLORIDE 1 MG/ML
INJECTION, SOLUTION INTRAMUSCULAR; INTRAVENOUS AS NEEDED
Status: DISCONTINUED | OUTPATIENT
Start: 2021-11-10 | End: 2021-11-10 | Stop reason: HOSPADM

## 2021-11-10 RX ORDER — DIPHENHYDRAMINE HYDROCHLORIDE 50 MG/ML
12.5 INJECTION, SOLUTION INTRAMUSCULAR; INTRAVENOUS AS NEEDED
Status: DISCONTINUED | OUTPATIENT
Start: 2021-11-10 | End: 2021-11-10 | Stop reason: HOSPADM

## 2021-11-10 RX ORDER — ROCURONIUM BROMIDE 10 MG/ML
INJECTION, SOLUTION INTRAVENOUS AS NEEDED
Status: DISCONTINUED | OUTPATIENT
Start: 2021-11-10 | End: 2021-11-10 | Stop reason: HOSPADM

## 2021-11-10 RX ORDER — FENTANYL CITRATE 50 UG/ML
50 INJECTION, SOLUTION INTRAMUSCULAR; INTRAVENOUS AS NEEDED
Status: DISCONTINUED | OUTPATIENT
Start: 2021-11-10 | End: 2021-11-10 | Stop reason: HOSPADM

## 2021-11-10 RX ORDER — ACETAMINOPHEN 325 MG/1
650 TABLET ORAL ONCE
Status: COMPLETED | OUTPATIENT
Start: 2021-11-10 | End: 2021-11-10

## 2021-11-10 RX ORDER — BUPIVACAINE HYDROCHLORIDE 5 MG/ML
50 INJECTION, SOLUTION EPIDURAL; INTRACAUDAL ONCE
Status: DISCONTINUED | OUTPATIENT
Start: 2021-11-10 | End: 2021-11-10 | Stop reason: HOSPADM

## 2021-11-10 RX ORDER — ONDANSETRON 2 MG/ML
INJECTION INTRAMUSCULAR; INTRAVENOUS AS NEEDED
Status: DISCONTINUED | OUTPATIENT
Start: 2021-11-10 | End: 2021-11-10 | Stop reason: HOSPADM

## 2021-11-10 RX ORDER — SODIUM CHLORIDE 9 MG/ML
25 INJECTION, SOLUTION INTRAVENOUS CONTINUOUS
Status: DISCONTINUED | OUTPATIENT
Start: 2021-11-10 | End: 2021-11-10 | Stop reason: HOSPADM

## 2021-11-10 RX ORDER — SODIUM CHLORIDE 0.9 % (FLUSH) 0.9 %
5-40 SYRINGE (ML) INJECTION EVERY 8 HOURS
Status: DISCONTINUED | OUTPATIENT
Start: 2021-11-10 | End: 2021-11-10 | Stop reason: HOSPADM

## 2021-11-10 RX ORDER — MIDAZOLAM HYDROCHLORIDE 1 MG/ML
0.5 INJECTION, SOLUTION INTRAMUSCULAR; INTRAVENOUS
Status: DISCONTINUED | OUTPATIENT
Start: 2021-11-10 | End: 2021-11-11 | Stop reason: HOSPADM

## 2021-11-10 RX ORDER — SUCCINYLCHOLINE CHLORIDE 20 MG/ML
INJECTION INTRAMUSCULAR; INTRAVENOUS AS NEEDED
Status: DISCONTINUED | OUTPATIENT
Start: 2021-11-10 | End: 2021-11-10 | Stop reason: HOSPADM

## 2021-11-10 RX ORDER — ONDANSETRON 4 MG/1
4 TABLET, ORALLY DISINTEGRATING ORAL
Qty: 10 TABLET | Refills: 0 | Status: SHIPPED | OUTPATIENT
Start: 2021-11-10

## 2021-11-10 RX ORDER — PROPOFOL 10 MG/ML
INJECTION, EMULSION INTRAVENOUS AS NEEDED
Status: DISCONTINUED | OUTPATIENT
Start: 2021-11-10 | End: 2021-11-10 | Stop reason: HOSPADM

## 2021-11-10 RX ORDER — HYDROMORPHONE HYDROCHLORIDE 2 MG/ML
INJECTION, SOLUTION INTRAMUSCULAR; INTRAVENOUS; SUBCUTANEOUS AS NEEDED
Status: DISCONTINUED | OUTPATIENT
Start: 2021-11-10 | End: 2021-11-10 | Stop reason: HOSPADM

## 2021-11-10 RX ORDER — DEXAMETHASONE SODIUM PHOSPHATE 4 MG/ML
INJECTION, SOLUTION INTRA-ARTICULAR; INTRALESIONAL; INTRAMUSCULAR; INTRAVENOUS; SOFT TISSUE AS NEEDED
Status: DISCONTINUED | OUTPATIENT
Start: 2021-11-10 | End: 2021-11-10 | Stop reason: HOSPADM

## 2021-11-10 RX ORDER — HYDROMORPHONE HYDROCHLORIDE 1 MG/ML
0.2 INJECTION, SOLUTION INTRAMUSCULAR; INTRAVENOUS; SUBCUTANEOUS
Status: DISCONTINUED | OUTPATIENT
Start: 2021-11-10 | End: 2021-11-11 | Stop reason: HOSPADM

## 2021-11-10 RX ORDER — NEOSTIGMINE METHYLSULFATE 1 MG/ML
INJECTION, SOLUTION INTRAVENOUS AS NEEDED
Status: DISCONTINUED | OUTPATIENT
Start: 2021-11-10 | End: 2021-11-10 | Stop reason: HOSPADM

## 2021-11-10 RX ORDER — FENTANYL CITRATE 50 UG/ML
25 INJECTION, SOLUTION INTRAMUSCULAR; INTRAVENOUS
Status: DISCONTINUED | OUTPATIENT
Start: 2021-11-10 | End: 2021-11-11 | Stop reason: HOSPADM

## 2021-11-10 RX ORDER — DEXMEDETOMIDINE HYDROCHLORIDE 100 UG/ML
INJECTION, SOLUTION INTRAVENOUS AS NEEDED
Status: DISCONTINUED | OUTPATIENT
Start: 2021-11-10 | End: 2021-11-10 | Stop reason: HOSPADM

## 2021-11-10 RX ORDER — OXYCODONE AND ACETAMINOPHEN 5; 325 MG/1; MG/1
1 TABLET ORAL
Qty: 20 TABLET | Refills: 0 | Status: SHIPPED | OUTPATIENT
Start: 2021-11-10 | End: 2021-11-15

## 2021-11-10 RX ORDER — MIDAZOLAM HYDROCHLORIDE 1 MG/ML
1 INJECTION, SOLUTION INTRAMUSCULAR; INTRAVENOUS AS NEEDED
Status: DISCONTINUED | OUTPATIENT
Start: 2021-11-10 | End: 2021-11-10 | Stop reason: HOSPADM

## 2021-11-10 RX ORDER — LIDOCAINE HYDROCHLORIDE 20 MG/ML
INJECTION, SOLUTION EPIDURAL; INFILTRATION; INTRACAUDAL; PERINEURAL AS NEEDED
Status: DISCONTINUED | OUTPATIENT
Start: 2021-11-10 | End: 2021-11-10 | Stop reason: HOSPADM

## 2021-11-10 RX ORDER — MORPHINE SULFATE 2 MG/ML
2 INJECTION, SOLUTION INTRAMUSCULAR; INTRAVENOUS
Status: DISCONTINUED | OUTPATIENT
Start: 2021-11-10 | End: 2021-11-11 | Stop reason: HOSPADM

## 2021-11-10 RX ORDER — SODIUM CHLORIDE, SODIUM LACTATE, POTASSIUM CHLORIDE, CALCIUM CHLORIDE 600; 310; 30; 20 MG/100ML; MG/100ML; MG/100ML; MG/100ML
125 INJECTION, SOLUTION INTRAVENOUS CONTINUOUS
Status: DISCONTINUED | OUTPATIENT
Start: 2021-11-10 | End: 2021-11-10 | Stop reason: HOSPADM

## 2021-11-10 RX ORDER — FENTANYL CITRATE 50 UG/ML
INJECTION, SOLUTION INTRAMUSCULAR; INTRAVENOUS AS NEEDED
Status: DISCONTINUED | OUTPATIENT
Start: 2021-11-10 | End: 2021-11-10 | Stop reason: HOSPADM

## 2021-11-10 RX ORDER — SODIUM CHLORIDE 0.9 % (FLUSH) 0.9 %
5-40 SYRINGE (ML) INJECTION AS NEEDED
Status: DISCONTINUED | OUTPATIENT
Start: 2021-11-10 | End: 2021-11-10 | Stop reason: HOSPADM

## 2021-11-10 RX ORDER — GLYCOPYRROLATE 0.2 MG/ML
INJECTION INTRAMUSCULAR; INTRAVENOUS AS NEEDED
Status: DISCONTINUED | OUTPATIENT
Start: 2021-11-10 | End: 2021-11-10 | Stop reason: HOSPADM

## 2021-11-10 RX ADMIN — DEXMEDETOMIDINE HYDROCHLORIDE 10 MCG: 100 INJECTION, SOLUTION, CONCENTRATE INTRAVENOUS at 08:01

## 2021-11-10 RX ADMIN — WATER 2 G: 1 INJECTION INTRAMUSCULAR; INTRAVENOUS; SUBCUTANEOUS at 07:47

## 2021-11-10 RX ADMIN — LIDOCAINE HYDROCHLORIDE 80 MG: 20 INJECTION, SOLUTION EPIDURAL; INFILTRATION; INTRACAUDAL; PERINEURAL at 07:37

## 2021-11-10 RX ADMIN — HYDROMORPHONE HYDROCHLORIDE 0.5 MG: 2 INJECTION, SOLUTION INTRAMUSCULAR; INTRAVENOUS; SUBCUTANEOUS at 07:58

## 2021-11-10 RX ADMIN — GLYCOPYRROLATE 0.6 MG: 0.2 INJECTION, SOLUTION INTRAMUSCULAR; INTRAVENOUS at 08:21

## 2021-11-10 RX ADMIN — SODIUM CHLORIDE, POTASSIUM CHLORIDE, SODIUM LACTATE AND CALCIUM CHLORIDE 125 ML/HR: 600; 310; 30; 20 INJECTION, SOLUTION INTRAVENOUS at 06:46

## 2021-11-10 RX ADMIN — ROCURONIUM BROMIDE 25 MG: 10 SOLUTION INTRAVENOUS at 07:40

## 2021-11-10 RX ADMIN — NEOSTIGMINE METHYLSULFATE 3 MG: 1 INJECTION, SOLUTION INTRAVENOUS at 08:21

## 2021-11-10 RX ADMIN — ONDANSETRON HYDROCHLORIDE 4 MG: 2 INJECTION, SOLUTION INTRAMUSCULAR; INTRAVENOUS at 07:45

## 2021-11-10 RX ADMIN — FENTANYL CITRATE 50 MCG: 50 INJECTION, SOLUTION INTRAMUSCULAR; INTRAVENOUS at 08:00

## 2021-11-10 RX ADMIN — HYDROMORPHONE HYDROCHLORIDE 0.5 MG: 2 INJECTION, SOLUTION INTRAMUSCULAR; INTRAVENOUS; SUBCUTANEOUS at 08:00

## 2021-11-10 RX ADMIN — ROCURONIUM BROMIDE 5 MG: 10 SOLUTION INTRAVENOUS at 07:37

## 2021-11-10 RX ADMIN — MIDAZOLAM 2 MG: 1 INJECTION INTRAMUSCULAR; INTRAVENOUS at 07:27

## 2021-11-10 RX ADMIN — SUCCINYLCHOLINE CHLORIDE 140 MG: 20 INJECTION, SOLUTION INTRAMUSCULAR; INTRAVENOUS at 07:37

## 2021-11-10 RX ADMIN — ROCURONIUM BROMIDE 10 MG: 10 SOLUTION INTRAVENOUS at 08:00

## 2021-11-10 RX ADMIN — DEXAMETHASONE SODIUM PHOSPHATE 4 MG: 4 INJECTION, SOLUTION INTRAMUSCULAR; INTRAVENOUS at 07:45

## 2021-11-10 RX ADMIN — ACETAMINOPHEN 650 MG: 325 TABLET ORAL at 06:27

## 2021-11-10 RX ADMIN — ONDANSETRON 4 MG: 2 INJECTION INTRAMUSCULAR; INTRAVENOUS at 09:28

## 2021-11-10 RX ADMIN — DEXMEDETOMIDINE HYDROCHLORIDE 10 MCG: 100 INJECTION, SOLUTION, CONCENTRATE INTRAVENOUS at 08:21

## 2021-11-10 RX ADMIN — PROPOFOL 200 MG: 10 INJECTION, EMULSION INTRAVENOUS at 07:37

## 2021-11-10 NOTE — ANESTHESIA POSTPROCEDURE EVALUATION
Post-Anesthesia Evaluation and Assessment    Patient: Rowdy Lagos MRN: [id number]  SSN: [id number]    YOB: 1965  Age: 60 y.o. Sex: female      I have evaluated the patient and they are stable and ready for discharge from the PACU. Cardiovascular Function/Vital Signs  Visit Vitals  /76   Pulse 70   Temp 36.5 °C (97.7 °F)   Resp 28   Wt 101.2 kg (223 lb)   SpO2 98%   BMI 38.28 kg/m²       Patient is status post General anesthesia for Procedure(s):  LAPAROSCOPIC LYSIS OF ADHESIONS. Nausea/Vomiting: None    Postoperative hydration reviewed and adequate. Pain:  Pain Scale 1: Numeric (0 - 10) (11/10/21 0930)  Pain Intensity 1: 0 (11/10/21 0930)   Managed    Neurological Status:   Neuro (WDL): Within Defined Limits (11/10/21 0930)  Neuro  LUE Motor Response: Purposeful (11/10/21 0930)  LLE Motor Response: Purposeful (11/10/21 0930)  RUE Motor Response: Purposeful (11/10/21 0930)  RLE Motor Response: Purposeful (11/10/21 0930)   At baseline    Mental Status, Level of Consciousness: Alert and  oriented to person, place, and time    Pulmonary Status:   O2 Device: None (Room air) (11/10/21 0930)   Adequate oxygenation and airway patent    Complications related to anesthesia: None    Post-anesthesia assessment completed. No concerns    Signed By: Felicia Silverman MD     November 10, 2021              Procedure(s):  LAPAROSCOPIC LYSIS OF ADHESIONS.     general    Anesthesia Post Evaluation        Level of consciousness: lethargic        INITIAL Post-op Vital signs:   Vitals Value Taken Time   /76 11/10/21 0945   Temp 36.5 °C (97.7 °F) 11/10/21 0845   Pulse 57 11/10/21 0945   Resp 18 11/10/21 0945   SpO2 98 % 11/10/21 0945

## 2021-11-10 NOTE — PERIOP NOTES
D/C Instructions provided to the pt's friend:  Jeermias Coates who is waiting in the Surgical Waiting Area.   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

## 2021-11-10 NOTE — PERIOP NOTES
11/10/2021      RE: Iram Rodriguez      To Whom it May Concern: This is to certify that Iram Rodriguez was under my care today: November 10, 2021). She may may return to work on November 15 or earlier. No Heavy Lifting greater 15 lbs for 2 weeks. Please feel free to contact my office if you have any questions or concerns. Thank you for your assistance in this matter.     Sincerely,      Dr. Rochelle Vides  [phone]      Alex Warner RN

## 2021-11-10 NOTE — DISCHARGE INSTRUCTIONS
PATIENT DISCHARGE INSTRUCTIONS      PATIENT DISCHARGE INSTRUCTIONS    Chele Garcia / [id number] : 1965    Admitted 11/10/2021 Discharged: 11/10/2021       · It is important that you take the medication exactly as they are prescribed. ·      Adult Sedation Discharge Instructions        Procedure Performed: Laparoscopy Lysis of Adhesions     Common side effects associated with each of these medications includes:  · Drowsiness, dizziness, euphoria, sleepiness or confusion  · Impaired memory recall  · Unsteady gait, loss of fine muscle control and delayed reaction time  · Visual disturbances, difficulty focusing, blurred vision    You may experience some of these side effects or you may not be aware of subtle changes in your behavior or reaction time. Because you received these medications, we are giving you the following instructions. Discharge Instructions:  · Do not consume alcoholic beverages for a minimum of 24 hours  · Do not make important personal, legal or business decisions for 24 hours  · Move slowly and carefully, do not make sudden position changes. Be alert for dizziness or lightheadedness and move accordingly. Have a responsible person assist you. · Do not drive for 24 hours  · Do not operate equipment for 24 hours - [employer], power tools, Kitchen accessories: stove, etc.    Diet/Diet Restrictions: Advance your diet as tolerated    Pain Management:   {PAIN MANAGEMENT:}    If you have any questions or concerns contact:     A) Call Dr. Sales Flank) Call your Primary Care Physician             OR     C) If you feel you have a life threatening emergency call 911    If you report to an emergency room, doctors office or hospital within 24 hours, BRING THIS [address] and give it to the nurse or physician attending to you.     · Keep your medication in the bottles provided by the pharmacist and keep a list of the medication names, dosages, and times to be taken in your wallet. · Do not take other medications without consulting your doctor. Dr. Johnson Pill  What to do at Home    Recommended Diet: Bariatric diet    Recommended Activity: No heavy lifting, pushing, pulling x 10 days; may shower Thursday    If you experience any of the following symptoms (fever, chills, worsening abdominal pain), please follow up with General Surgery.     Future Appointments   Date Time Provider Slim Haro   12/8/2021  1:00 PM SPT MAMMO 1 SPTMAMMO SPT   12/9/2021  3:40 PM Kaylin Oro MD [facility] BS AMB

## 2021-11-10 NOTE — ANESTHESIA PREPROCEDURE EVALUATION
Relevant Problems   RESPIRATORY SYSTEM   (+) SOB (shortness of breath) on exertion      NEUROLOGY   (+) Depression   (+) Migraines      CARDIOVASCULAR   (+) Hypertension      ENDOCRINE   (+) Arthritis   (+) Morbid obesity (HCC)       Anesthetic History   No history of anesthetic complications            Review of Systems / Medical History  Patient summary reviewed, nursing notes reviewed and pertinent labs reviewed    Pulmonary  Within defined limits                 Neuro/Psych   Within defined limits      Psychiatric history     Cardiovascular  Within defined limits  Hypertension: well controlled              Exercise tolerance: >4 METS     GI/Hepatic/Renal  Within defined limits              Endo/Other  Within defined limits      Obesity and arthritis     Other Findings   Comments: abd pain           Physical Exam    Airway  Mallampati: II  TM Distance: > 6 cm  Neck ROM: normal range of motion   Mouth opening: Normal     Cardiovascular  Regular rate and rhythm,  S1 and S2 normal,  no murmur, click, rub, or gallop             Dental  No notable dental hx       Pulmonary  Breath sounds clear to auscultation               Abdominal  GI exam deferred       Other Findings            Anesthetic Plan    ASA: 3  Anesthesia type: general          Induction: Intravenous  Anesthetic plan and risks discussed with: Patient

## 2021-11-10 NOTE — BRIEF OP NOTE
Brief Postoperative Note    Patient: Denilson Cartwright  YOB: 1965  MRN: [id number]    Date of Procedure: 11/10/2021     Pre-Op Diagnosis: LLQ ABDOMINAL PAIN, PRIOR GASTRIC BYPASS    Post-Op Diagnosis: Same as preoperative diagnosis. Procedure(s):  LAPAROSCOPIC LYSIS OF ADHESIONS    Surgeon(s):  Dirk Mary MD    Surgical Assistant: Physician Assistant: KELLY Horner    Anesthesia: General     Estimated Blood Loss (mL): Minimal    Complications: None    Specimens: * No specimens in log *     Implants: * No implants in log *    Drains: * No LDAs found *    Findings: [facility] [address] [facility] [address], lysed; no internal hernia.      Electronically Signed by Jose Palafox MD on 11/10/2021 at 8:25 AM

## 2021-11-10 NOTE — H&P
Subjective:      Sahil Nevarez is a 60 y.o. female who is being seen for evaluation of abdominal pain. The pain is located in the LLQ without radiation. Pain is described as dull and aching and measures 7/10 in intensity. Onset of pain was several months ago. Aggravating factors include certain positions. Alleviating factors include lying down. Associated symptoms include mild nausea; she denies emesis, fever, chills, or change in bowel habits. She has a history of urinary tract infections. She has a history of laparoscopic gastric bypass approximately 10 years ago, with recent weight regain. Plantar fasciitis symptoms are improving. Patient Active Problem List    Diagnosis Date Noted    Macromastia 12/05/2019    Chronic cholecystitis 09/26/2013    Elevated LFTs 09/26/2013    Other and unspecified postsurgical nonabsorption 07/26/2012    Abnormal EKG 06/13/2011    Depression 05/27/2011    Hypertension 05/26/2011    Arthritis 05/26/2011    Chronic knee pain 05/26/2011    Varicose veins 05/26/2011    SOB (shortness of breath) on exertion 05/26/2011    Migraines     Sinusitis     Joint pain     Snoring     Morbid obesity ([address]      Past Medical History:   Diagnosis Date    Arthritis     KNEES.  Depression     Hypertension     HX OF. NONE SINCE GBP IN 2011    Ill-defined condition     MIGRAINES    Joint pain     Kidney stones 2017    Menopause     Pt states she has not had a period in a year.  Migraines     Morbid obesity (HCC)     LOST 80 PDS AFTER GBP SURGERY.     Psychiatric disorder     Anxiety    Sinusitis     Snoring       Past Surgical History:   Procedure Laterality Date    HX ACL RECONSTRUCTION  2009    RIGHT KNEE 2ND TIME    HX ACL RECONSTRUCTION  2008    RIGHT KNEE    HX BREAST REDUCTION Bilateral 12/5/2019    BILATERAL BREAST REDUCTION performed by [facility] at [facility] [address] HX CYST REMOVAL      Left ankle in 1999, right wrist 2008, and scalp  HX GASTRIC BYPASS  2011    GASTRIC BYPASS    HX GYN  2001    ABLATION    HX HEENT      Cyst removed from forehead    HX LAP CHOLECYSTECTOMY  10/18/13    by  Doc Player HX [id number] HX OTHER SURGICAL  2000    CYST REMOVED L ANKLE,     HX OTHER SURGICAL  2012    CYST REMOVED FROM WRIST    HX OTHER SURGICAL  2014    REMOVAL BONY CYST FOREHEAD    HX TONSILLECTOMY  1967    HX TUBAL LIGATION  1990    HX UROLOGICAL  2018    Kidney stone surgically removed    HX WISDOM TEETH EXTRACTION  1982    SD ENDOMETRIAL ABLATION, THERMAL      10 years ago    SD LAP GASTRIC BYPASS/BREANNA-EN-Y  09/21/11    liver biopsy,EGD Dr Silvia Bautista, [facility]      Social History     Tobacco Use    Smoking status: Never Smoker    Smokeless tobacco: Never Used   Substance Use Topics    Alcohol use: No      Family History   Problem Relation Age of Onset    Heart Disease Father     Heart Attack Father         early 63's    Heart Failure Father     Diabetes Brother     Hypertension Brother     Elevated Lipids Brother     Diabetes Maternal Grandfather     Seizures Son     Hypertension Mother     High Cholesterol Mother     Thyroid Disease Mother     Arthritis-rheumatoid Sister     Heart Failure Paternal Grandmother     Breast Cancer Maternal Aunt         age unknown    Hypertension Brother     High Cholesterol Brother     Anesth Problems Neg Hx       Current Facility-Administered Medications   Medication Dose Route Frequency    ceFAZolin (ANCEF) 2 g in sterile water (preservative free) 20 mL IV syringe  2 g IntraVENous ON CALL TO OR    lactated Ringers infusion  125 mL/hr IntraVENous CONTINUOUS    0.9% sodium chloride infusion  25 mL/hr IntraVENous CONTINUOUS    sodium chloride (NS) flush 5-40 mL  5-40 mL IntraVENous Q8H    sodium chloride (NS) flush 5-40 mL  5-40 mL IntraVENous PRN    lidocaine (PF) (XYLOCAINE) 10 mg/mL (1 %) injection 0.1 mL  0.1 mL SubCUTAneous PRN    fentaNYL citrate (PF) injection 50 mcg  50 mcg IntraVENous PRN    midazolam (VERSED) injection 1 mg  1 mg IntraVENous PRN      Allergies   Allergen Reactions    Cigarette Smoke Other (comments)     Allergic to mold, pollen,cigarettes, causes burning eyes, headaches    Mold Other (comments)     Allergic to mold, pollen,cigarettes, causes burning eyes, headaches    Other Plant, Animal, Environmental Other (comments)     Pollen: Allergic to mold, pollen,cigarettes, causes burning eyes, headaches       Review of Systems:    A complete review of systems was negative except as noted in the HPI. Objective:        Visit Vitals  BP (!) 170/77 (BP 1 Location: Right upper arm, BP Patient Position: At rest;Supine)   Pulse (!) 50   Temp 98.1 °F (36.7 °C)   Resp 16   Wt 223 lb (101.2 kg)   LMP 09/27/2013   SpO2 97%   BMI 38.28 kg/m²       Physical Exam:  GENERAL: alert, cooperative, no distress, appears stated age, morbidly obese, EYE: negative, THROAT & NECK: normal, LUNG: clear to auscultation bilaterally, HEART: regular rate and rhythm, S1, S2 normal, no murmur, click, rub or gallop, ABDOMEN: soft, non-tender. Bowel sounds normal. No masses,  no organomegaly, EXTREMITIES:  extremities normal, atraumatic, no cyanosis or edema, SKIN: Normal., NEUROLOGIC: negative    Image Review:  Gastric bypass anatomy; bilateral urolithiasis. Assessment:     Abdominal pain following laparoscopic gastric bypass; concern for internal hernia. Plan:     1. I recommend proceeding with diagnostic laparoscopy. 2. Discussed aspects of surgical intervention, methods, risks (including by not limited to infection, bleeding, hematoma, negative findings, conversion open procedure, recurrent/persistent symptoms and perforation of the intestines or solid organs), and the risks of general anesthetic.   The patient understands the risks; and all questions were answered to the patient's satisfaction. 3. Patient wishes to proceed with surgery.

## 2021-11-11 NOTE — OP NOTES
[address] [place] [address] REPORT    Name:  Mauro Ramos  MR#:  [id number]  :  1965  ACCOUNT #:  [de-identified]  DATE OF SERVICE:  11/10/2021    PREOPERATIVE DIAGNOSIS:  Left lower quadrant abdominal pain, prior gastric bypass. POSTOPERATIVE DIAGNOSES:  1. Left lower quadrant abdominal pain, prior gastric bypass. 2.  Intra-abdominal adhesions. PROCEDURE PERFORMED:  Laparoscopic lysis of adhesions (CPT 69176). SURGEON:  Patience Bennett MD    ASSISTANT:  KELLY Moore    ANESTHESIA:  General endotracheal.    COMPLICATIONS:  None. SPECIMENS REMOVED:  None. IMPLANTS:  None. ESTIMATED BLOOD LOSS:  20 mL. DRAINS:  None. COUNTS:  Sponge count correct. Needle count correct. INDICATIONS:  The patient is a 12-year-old white female with a history of morbid obesity, managed through laparoscopic gastric bypass in  with satisfactory weight loss results. She has gradually regained weight, and has also developed intermittent left lower quadrant abdominal pain, 7/10 in intensity. CT scan is unrevealing. Her findings are concerning for internal hernia or intra-abdominal adhesive disease. She is taken to the operating room today for diagnostic laparoscopy. I have asked KELLY Moore to assist with the procedure given the technical complexity of reoperative laparoscopic bariatric surgery. She will run the laparoscope, assist in delineation of the anatomy, and assist in management of any identified pathology. FINDINGS:  1. Interloop adhesions at the jejunojejunostomy, lysed. 2.  No internal hernias identified. PROCEDURE:  The patient was identified as the correct patient in the preoperative holding area and informed consent was confirmed. After answering the patient's remaining questions, she was taken to the operating room and placed on the operating room table in the supine position.   Sequential compression devices were placed on both lower extremities. Following the uneventful initiation of general anesthesia, she was carefully secured to the operating room table with safety strap in place. All potential pressure points were padded with eggcrate. Her arms were tucked to her sides. Her abdomen was prepped and draped in the usual sterile fashion. Final time-out was performed, and it was confirmed she had received intravenous antibiotics. A 5-mm trocar was inserted through a small right-sided skin incision using an Optiview technique. After confirming intraperitoneal location of the trocar tip, insufflation with carbon dioxide gas was initiated. Once adequate working sites had been developed, the 5-mm, 30-degree laparoscope was inserted. No signs of trocar injury were present. On inspection of the abdominal space, the bowel appeared normal in appearance and caliber. No adhesions between the viscera and the anterior abdominal wall were present. Three additional 5-mm trocars were inserted through small incisions using visual guidance with the laparoscope. The patient was placed in a steep Trendelenburg position. The terminal ileum was identified, and the bowel was run in retrograde fashion towards the jejunojejunostomy. Upon reaching the jejunojejunostomy, multiple interloop adhesions were encountered, which were lysed using careful sharp dissection, freeing the proximal common channel from the jejunojejunostomy, and freeing the distal Mary limb from the area of the jejunojejunostomy. The biliopancreatic limb was then run in retrograde fashion to the ligament of Treitz, no pathology was identified. The area of the jejunojejunostomy was reinspected, no signs of internal hernia were present. The Mary limb was then run in retrograde fashion to the area of the gastrojejunostomy. Cartagena's space was noted to be closed from prior suture closure of the defect at time of gastric bypass.   The bowel was then run in antegrade fashion back to the jejunojejunostomy. No additional pathology was identified. No signs of visceral injury or bleeding were present. Pneumoperitoneum was then released, and all trocars were removed. All wounds were infiltrated with 0.5% Marcaine without epinephrine. All skin edges were reapproximated with a combination of subcuticular 4-0 Monocryl suture and Dermabond. The patient tolerated the procedure well. She was extubated in the operating room and transported to the recovery area in stable condition. The attending surgeon, Dr. Dominick Ellison, was scrubbed and present for the entire procedure.         MD RAFAEL [email or web address]  D:  11/10/2021 20:50  T:  11/10/2021 22:01  JOB #:  [id number]

## 2021-12-07 ENCOUNTER — TELEPHONE (OUTPATIENT)
Dept: SURGERY | Age: 56
End: 2021-12-07

## 2021-12-07 NOTE — TELEPHONE ENCOUNTER
Called patient to remind them of their up coming appointment on Thursday, december 9th and Inform the patient of our cancellation policy and [employer].

## 2021-12-08 ENCOUNTER — HOSPITAL ENCOUNTER (OUTPATIENT)
Dept: MAMMOGRAPHY | Age: 56
Discharge: HOME | End: 2021-12-08
Attending: FAMILY MEDICINE
Payer: COMMERCIAL

## 2021-12-08 DIAGNOSIS — Z12.31 SCREENING MAMMOGRAM FOR HIGH-RISK PATIENT: ICD-10-CM

## 2021-12-08 PROCEDURE — 77063 BREAST TOMOSYNTHESIS BI: CPT

## 2021-12-09 ENCOUNTER — OFFICE VISIT (OUTPATIENT)
Dept: SURGERY | Age: 56
End: 2021-12-09
Payer: COMMERCIAL

## 2021-12-09 VITALS
SYSTOLIC BLOOD PRESSURE: 149 MMHG | OXYGEN SATURATION: 96 % | HEART RATE: 79 BPM | BODY MASS INDEX: 37.83 KG/M2 | TEMPERATURE: 98.5 F | HEIGHT: 64 IN | DIASTOLIC BLOOD PRESSURE: 89 MMHG | WEIGHT: 221.6 LBS | RESPIRATION RATE: 18 BRPM

## 2021-12-09 DIAGNOSIS — Z09 POSTOPERATIVE EXAMINATION: Primary | ICD-10-CM

## 2021-12-09 PROCEDURE — 99024 POSTOP FOLLOW-UP VISIT: CPT | Performed by: SURGERY

## 2021-12-09 NOTE — PROGRESS NOTES
1. Have you been to the ER, urgent care clinic since your last visit? Hospitalized since your last visit? No    2. Have you seen or consulted any other health care providers outside of the [address] [place] [address] since your last visit? Include any pap smears or colon screening.  No

## 2021-12-14 NOTE — PATIENT INSTRUCTIONS
Learning About Weight-Loss (Bariatric) Surgery  What is weight-loss surgery? Bariatric surgery is surgery to help you lose weight. This type of surgery is only used for people who are very overweight and have not been able to lose weight with diet and exercise. This surgery makes the stomach smaller. Some types of surgery also change the connection between your stomach and intestines. Having weight-loss surgery is a big step. After surgery, you'll need to make new, lifelong changes in how you eat and drink. How is weight-loss surgery done? Bariatric surgery may be either \"open\" or \"laparoscopic. \" Open surgery is done through a large cut (incision) in the belly. Laparoscopic surgery is done through several small cuts. The doctor puts a lighted tube, or scope, and other surgical tools through small cuts in your belly. The doctor is able to see your organs with the scope. There are different types of bariatric surgery. Gastric sleeve surgery  The surgery is usually done through several small incisions in the belly. The doctor removes more than half of your stomach. This leaves a thin sleeve, or tube, that is about the size of a banana. Because part of your stomach has been removed, this can't be reversed. Mary-en-Y gastric bypass surgery  Mary-en-Y (say \"gracie-en-why\") surgery changes the connection between the stomach and the intestines. The doctor separates a section of your stomach from the rest of your stomach. This makes a small pouch. The new pouch will hold the food you eat. The doctor connects the stomach pouch to the middle part of the small intestine. Gastric banding surgery  The surgery is usually done through several small incisions in the belly. The doctor wraps a band around the upper part of the stomach. This creates a small pouch. The small size of the pouch means that you will get full after you eat just a small amount of food.  The doctor can inflate or deflate the band to adjust the size. This lets the doctor adjust how quickly food passes from the new pouch into the stomach. It does not change the connection between the stomach and the intestines. What can you expect after the surgery? You may stay in the hospital for one or more days after the surgery. How long you stay depends on the type of surgery you had. Most people need 2 to 4 weeks before they are ready to get back to their usual routine. Your doctor will give you specific instructions about what to eat after the surgery. You'll start with only small amounts of soft foods and liquids. Bit by bit, you will be able to eat more solid foods. Your doctor may advise you to work with a dietitian. This way you'll be sure to get enough protein, vitamins, and minerals while you are losing weight. Even with a healthy diet, you may need to take vitamin and mineral supplements. After surgery, you will not be able to eat very much at one time. You will get full quickly. Try not to eat too much at one time or eat foods that are high in fat or sugar. If you do, you may vomit, get stomach pain, or have diarrhea. Weight loss  You probably will lose weight very quickly in the first few months after surgery. As time goes on, your weight loss will slow down. You will have regular doctor visits to check how you are doing. Emotions  It is common to have many emotions after this surgery. You may feel happy or excited as you begin to lose weight. But you may also feel overwhelmed or frustrated by the changes that you have to make in your diet, activity, and lifestyle. Talk with your doctor if you have concerns or questions. Think of bariatric surgery as a tool to help you lose weight. It isn't an instant fix. You will still need to eat a healthy diet and get regular exercise. This will help you reach your weight goal and avoid regaining the weight you lose. Follow-up care is a key part of your treatment and safety.  Be sure to make and go to all appointments, and call your doctor if you are having problems. It's also a good idea to know your test results and keep a list of the medicines you take. Where can you learn more? Go to http://www.gray.com/  Enter G469 in the search box to learn more about \"Learning About Weight-Loss (Bariatric) Surgery. \"  Current as of: March 17, 2021               Content Version: 13.0  © 1172-4762 [employer], [employer]. Care instructions adapted under license by [employer] (which disclaims liability or warranty for this information). If you have questions about a medical condition or this instruction, always ask your healthcare professional. [address] any warranty or liability for your use of this information.

## 2021-12-14 NOTE — PROGRESS NOTES
Subjective:      Mamadou Dick is a 60 y.o. female presents for postop care several weeks following diagnostic laparoscopy for evaluation of abdominal pain, prior gastric bypass. Appetite is good. Eating a regular diet without difficulty. Bowel movements are regular. The patient is voiding without difficulty. She notes significant improvement in LLQ abdominal pain since procedure; no fever, chills, or wound drainage. Objective:     Visit Vitals  BP (!) 149/89 (BP 1 Location: Left arm, BP Patient Position: Sitting, BP Cuff Size: Large adult)   Pulse 79   Temp 98.5 °F (36.9 °C) (Oral)   Resp 18   Ht 5' 4\" (1.626 m)   Wt 221 lb 9.6 oz (100.5 kg)   LMP 09/27/2013   SpO2 96%   BMI 38.04 kg/m²       General:  alert, cooperative, no distress, appears stated age, morbidly obese   Abdomen: soft, non-tender, no hernias   Incision:   healing well, no drainage, no erythema, no hernia, no seroma, no swelling, no dehiscence, incision well approximated     Assessment:     Doing well postoperatively. Plan:     1. Continue current medications. 2. Bariatric diet, supplements. Exercise as podiatry issues allow. 3. Routine bariatric F/U in 2022.

## 2022-03-19 PROBLEM — N62 MACROMASTIA: Status: ACTIVE | Noted: 2019-12-05

## 2022-06-20 ENCOUNTER — OFFICE VISIT (OUTPATIENT)
Dept: ORTHOPEDIC SURGERY | Age: 57
End: 2022-06-20
Payer: COMMERCIAL

## 2022-06-20 VITALS — HEIGHT: 64 IN | WEIGHT: 223 LBS | BODY MASS INDEX: 38.07 KG/M2

## 2022-06-20 DIAGNOSIS — M75.102 NONTRAUMATIC ROTATOR CUFF TEAR, LEFT: ICD-10-CM

## 2022-06-20 DIAGNOSIS — M25.512 ACUTE PAIN OF BOTH SHOULDERS: Primary | ICD-10-CM

## 2022-06-20 DIAGNOSIS — M75.101 NON-TRAUMATIC ROTATOR CUFF TEAR, RIGHT: ICD-10-CM

## 2022-06-20 DIAGNOSIS — M25.511 ACUTE PAIN OF BOTH SHOULDERS: Primary | ICD-10-CM

## 2022-06-20 PROCEDURE — 99203 OFFICE O/P NEW LOW 30 MIN: CPT | Performed by: ORTHOPAEDIC SURGERY

## 2022-06-20 NOTE — PROGRESS NOTES
Inés Alamo (: 1965) is a 60 y.o. female, patient, here for evaluation of the following chief complaint(s):  Shoulder Pain (bilateral shoulder pain)       HPI:    Patient presents the office today with a chief complaint of bilateral shoulder pain. She is also here today with left elbow pain. She is describing left elbow pain and points to the proximal forearm aspect anterolaterally. She has had an MRI evaluation. She states with a lot of activity she will develop discomfort across this location. However, she is mostly here today for right shoulder pain greater than left shoulder pain. She has had chronic problems with the shoulder. She believes this all stemmed from her days of being a . She has had pain with overhead activity. She has nighttime pain. She has had MRIs of which she brought to this office. Allergies   Allergen Reactions    Cigarette Smoke Other (comments)     Allergic to mold, pollen,cigarettes, causes burning eyes, headaches    Mold Other (comments)     Allergic to mold, pollen,cigarettes, causes burning eyes, headaches    Other Plant, Animal, Environmental Other (comments)     Pollen: Allergic to mold, pollen,cigarettes, causes burning eyes, headaches       Current Outpatient Medications   Medication Sig    ondansetron (ZOFRAN ODT) 4 mg disintegrating tablet Take 1 Tablet by mouth every eight (8) hours as needed for Nausea or Vomiting. (Patient not taking: Reported on 2021)    Biotin 2,500 mcg cap Take 5,000 mcg by mouth daily.  potassium citrate (UROCIT-K10) 10 mEq (1,080 mg) TbER Take 10 mEq by mouth two (2) times a day. (Patient not taking: Reported on 2021)    tamsulosin (FLOMAX) 0.4 mg capsule Take 0.4 mg by mouth nightly as needed. (Patient not taking: Reported on 2021)    ketorolac (TORADOL) 10 mg tablet Take 10 mg by mouth as needed for Pain.  (Patient not taking: Reported on 2021)    diclofenac (VOLTAREN) 1 % gel APPLY 2 GRAMS TO THE AFFECTED AREA(S) BY TOPICAL ROUTE 4 TIMES PER DAY (Patient not taking: Reported on 12/9/2021)    Venlafaxine-ER 24 HR (EFFEXOR-ER) 75 mg tr24 tablet Take 75 mg by mouth daily.  iron, carbonyl (FEOSOL) 45 mg tab Take 1 Tab by mouth daily.  docosahexanoic acid/epa (FISH OIL PO) Take 1,000 mg by mouth.  CALCIUM PO Take 1,200 mg by mouth two (2) times a day.  cholecalciferol (VITAMIN D3) (2,000 UNITS /50 MCG) cap capsule Take  by mouth daily.  glucosa carson 2KCl/chondroitin carson (GLUCOSAMINE-CHONDROITIN 3X STR PO) Take 1 Tablet by mouth daily.  eletriptan (RELPAX) 40 mg tablet Take 40 mg by mouth as needed for Pain (Migraines). may repeat in 2 hours if necessary    pimecrolimus (ELIDEL) 1 % topical cream Apply  to affected area as needed. (Patient not taking: Reported on 12/9/2021)    cyanocobalamin (VITAMIN B-12) 1,000 mcg sublingual tablet Take 1,000 mcg by mouth every other day.  ascorbic acid (VITAMIN C) 500 mg tablet Take 1,000 mg by mouth daily.  MULTIVITAMIN PO Take 1 Tab by mouth two (2) times a day. USES CENTRUM     No current facility-administered medications for this visit. Past Medical History:   Diagnosis Date    Arthritis     KNEES.  Depression     Hypertension     HX OF. NONE SINCE GBP IN 2011    Ill-defined condition     MIGRAINES    Joint pain     Kidney stones 2017    Menopause     Pt states she has not had a period in a year.  Migraines     Morbid obesity (HCC)     LOST 80 PDS AFTER GBP SURGERY.     Psychiatric disorder     Anxiety    Sinusitis     Snoring         Past Surgical History:   Procedure Laterality Date    HX ACL RECONSTRUCTION  2009    RIGHT KNEE 2ND TIME    HX ACL RECONSTRUCTION  2008    RIGHT KNEE    HX BREAST REDUCTION Bilateral 12/5/2019    BILATERAL BREAST REDUCTION performed by Kiki Whitaker MD at [facility] HX CYST REMOVAL      Left ankle in 1999, right wrist 2008, and scalp    HX GASTRIC BYPASS  2011    GASTRIC BYPASS    HX GYN  2001    ABLATION    HX HEENT      Cyst removed from forehead    HX LAP CHOLECYSTECTOMY  10/18/13    by Dr Sagrario Helm HX [address] HX OTHER SURGICAL  2000    CYST REMOVED L ANKLE,     HX OTHER SURGICAL  2012    CYST REMOVED FROM WRIST    HX OTHER SURGICAL  2014    REMOVAL BONY CYST FOREHEAD    HX TONSILLECTOMY  1967    HX TUBAL LIGATION  1990    HX UROLOGICAL  2018    Kidney stone surgically removed    HX WISDOM TEETH EXTRACTION  1982    [place] ENDOMETRIAL ABLATION, THERMAL      10 years ago    [place] LAP GASTRIC BYPASS/BREANNA-EN-Y  09/21/11    liver biopsy,EGD Dr Vashti Zambrano, [facility]       Family History   Problem Relation Age of Onset    Heart Disease Father     Heart Attack Father         early 63's    Heart Failure Father     Diabetes Brother     Hypertension Brother     Elevated Lipids Brother     Diabetes Maternal Grandfather     Seizures Son     Hypertension Mother     High Cholesterol Mother     Thyroid Disease Mother     Arthritis-rheumatoid Sister     Heart Failure Paternal Grandmother     Breast Cancer Maternal Aunt         age unknown    Hypertension Brother     High Cholesterol Brother     Anesth Problems Neg Hx         Social History     Socioeconomic History    Marital status:      Spouse name: Not on file    Number of children: Not on file    Years of education: Not on file    Highest education level: Not on file   Occupational History    Not on file   Tobacco Use    Smoking status: Never Smoker    Smokeless tobacco: Never Used   Vaping Use    Vaping Use: Never used   Substance and Sexual Activity    Alcohol use: No    Drug use: No    Sexual activity: Not Currently   Other Topics Concern    Not on file   Social History Narrative    Not on file     Social Determinants of Health     Financial Resource Strain:     Difficulty of Paying Living Expenses: Not on file Food Insecurity:     Worried About Running Out of Food in the Last Year: Not on file    Markos of Food in the Last Year: Not on file   Transportation Needs:     Lack of Transportation (Medical): Not on file    Lack of Transportation (Non-Medical): Not on file   Physical Activity:     Days of Exercise per Week: Not on file    Minutes of Exercise per Session: Not on file   Stress:     Feeling of Stress : Not on file   Social Connections:     Frequency of Communication with Friends and Family: Not on file    Frequency of Social Gatherings with Friends and Family: Not on file    Attends Yazidi Services: Not on file    Active Member of [address] [place] [address] [employer] or Organizations: Not on file    Attends Club or Organization Meetings: Not on file    Marital Status: Not on file   Intimate Partner Violence:     Fear of Current or Ex-Partner: Not on file    Emotionally Abused: Not on file    Physically Abused: Not on file    Sexually Abused: Not on file   Housing Stability:     Unable to Pay for Housing in the Last Year: Not on file    Number of [place] in the Last Year: Not on file    Unstable Housing in the Last Year: Not on file       Review of Systems   Musculoskeletal:        Bilateral shoulder pain elbow pain       Vitals:  Ht 5' 4\" (1.626 m)   Wt 223 lb (101.2 kg)   LMP 09/27/2013   BMI 38.28 kg/m²    Body mass index is 38.28 kg/m². Ortho Exam     Patient is alert and oriented x3. Patient is in no acute distress. Patient ambulates with a nonantalgic gait. Right shoulder: No atrophy is noted. Patient has full range of motion of the shoulder. She has pain through the superior arc of motion. She has decreased strength with forward elevation, lateral duction as well as external rotation at 4/5. She has a negative lag sign. She has pain with manipulation in all planes of motion. There is no swelling noted distally. Neurovascular examination is intact. Left shoulder:No atrophy is noted. Patient has full range of motion of the shoulder. She has pain through the superior arc of motion. She has decreased strength with forward elevation, lateral duction as well as external rotation at 4/5. She has a negative lag sign. She has pain with manipulation in all planes of motion. There is no swelling noted distally. Neurovascular examination is intact. Right elbow: Patient has no effusion. Patient has full range of motion of the elbow joint. There is no crepitation. Patient has no instability. Patient has pain located along the proximal lateral forearm musculature with a little bit of nodularity noted to palpation of the subcutaneous tissue. Neurovascular examination intact    XR Results (most recent):  Results from Appointment encounter on 06/20/22    XR SHOULDER RT AP/LAT MIN 2 V    Narrative  Three-view x-ray of the right shoulder reveals age-related acromioclavicular arthritis. The shoulder is centered. I do not appreciate significant arthritis of glenohumeral joint   XR Results (most recent):  Results from Appointment encounter on 06/20/22    XR SHOULDER RT AP/LAT MIN 2 V    Narrative  Three-view x-ray of the right shoulder reveals age-related acromioclavicular arthritis. The shoulder is centered. I do not appreciate significant arthritis of glenohumeral joint      XR SHOULDER LT AP/LAT MIN 2 V    Narrative  Three-view x-ray of the left shoulder reveals no significant signs of glenohumeral arthritis. There is acromioclavicular arthritis. The shoulder is concentrically reduced     Patient has had an MRI evaluation which reveals massive rotator cuff tendon tear of both the right and the left. The right shoulder has retraction all the way to the level of the glenoid. There is atrophy of the supraspinatus and infraspinatus although there is not a whole lot of fatty degeneration. Similar finding on the left MRI however the level of retraction is not quite as bad.     MRI evaluation of the right elbow reveals peculiar finding of lateral collateral injury. I do not appreciate any soft tissue swelling in the location that was noted by the patient's location of pain     ASSESSMENT/PLAN:    I have gone over the findings with the patient. First and foremost, I do not detect any anatomic situation of the elbow. Probably, patient has had chronic on and off lateral epicondylitis and has had some changes that are more chronically as opposed to an acute injury to the radial collateral ligament. I am happy to have her see my hand doctor. However, I do not think that this will really change my suggestion. In regards to the shoulder, patient presents to the office with a chronic massive rotator cuff tendon tear of bilateral shoulders. She actually has pretty decent function although she does have loss of strength. I have gone over nonoperative treatment with her as well as operative treatment. 1 consideration is to consider suture repair since she does not have's superior migration of the humeral head and I think this is reasonable. The downside is the outcome is going to be unpredictable. Another option would be to consider reverse total shoulder replacement which is more predictable but is a little bit more intrusive and nonreversible. She understands this. She is leaning more towards the reverse total shoulder replacement particularly for her right. I agree with her given these findings her outcome is going to be more predictable with a reverse total shoulder replacement. If she would like to consider this for her right we will proceed with setting this up. I would recommend a CT scan for navigation purposes. I went over the surgical have procedure in great detail. The risks and benefits were described to the patient. Patient understands there is a risk of infection, postoperative pain, numbness, tingling, stiffness MI, DVT, PE, and other unforeseen events.   The patient also understands there is a long rehabilitative process that typically follows the surgical procedure. We talked about the possibility of not being able to alleviate all of the discomfort. Also, I explained  there is no guarantee all the function and strength will return. The patient also understands the risks of re-tear or failure to heal.  The patient understands implants may be utilized during this surgery. The patient also understands the generalized, associated risk of anesthetic and wishes to proceed in an elective fashion.         Jaky Sidhu MD

## 2022-06-20 NOTE — Clinical Note
6/20/2022    Patient: Sol Arrington   YOB: 1965   Date of Visit: 6/20/2022     Crystal Escobar MD  [address] [address]  Via Fax: [phone]     Aguila Mari MD  Via     Dear MD Aguila Huff MD,      Thank you for referring Ms. Loi Bryson to [facility] for evaluation. My notes for this consultation are attached. If you have questions, please do not hesitate to call me. I look forward to following your patient along with you.       Sincerely,    Erinn Ryan MD

## 2022-06-21 DIAGNOSIS — M75.101 NON-TRAUMATIC ROTATOR CUFF TEAR, RIGHT: Primary | ICD-10-CM

## 2022-08-05 DIAGNOSIS — M75.101 NON-TRAUMATIC ROTATOR CUFF TEAR, RIGHT: Primary | ICD-10-CM

## 2022-09-01 ENCOUNTER — DOCUMENTATION ONLY (OUTPATIENT)
Dept: ORTHOPEDIC SURGERY | Age: 57
End: 2022-09-01

## 2022-09-09 DIAGNOSIS — M75.101 NON-TRAUMATIC ROTATOR CUFF TEAR, RIGHT: Primary | ICD-10-CM

## 2022-09-22 ENCOUNTER — TELEPHONE (OUTPATIENT)
Dept: ORTHOPEDIC SURGERY | Age: 57
End: 2022-09-22

## 2022-09-22 NOTE — TELEPHONE ENCOUNTER
Patient will need a peer to peer for CT scan for diagnostic purposes.     [phone] will get Dr. Zana Vidales to do this on Monday as he has been in surgery all day today and was late coming in for clinic. [email or web address]

## 2022-10-10 ENCOUNTER — HOSPITAL ENCOUNTER (OUTPATIENT)
Dept: CT IMAGING | Age: 57
Discharge: HOME | End: 2022-10-10
Attending: ORTHOPAEDIC SURGERY
Payer: COMMERCIAL

## 2022-10-10 DIAGNOSIS — M75.101 NON-TRAUMATIC ROTATOR CUFF TEAR, RIGHT: ICD-10-CM

## 2022-10-10 PROCEDURE — 73200 CT UPPER EXTREMITY W/O DYE: CPT

## 2022-10-13 DIAGNOSIS — Z98.890 HISTORY OF REVERSE TOTAL REPLACEMENT OF RIGHT SHOULDER JOINT: Primary | ICD-10-CM

## 2022-10-20 RX ORDER — OXYCODONE AND ACETAMINOPHEN 5; 325 MG/1; MG/1
1 TABLET ORAL
Qty: 40 TABLET | Refills: 0 | Status: SHIPPED | OUTPATIENT
Start: 2022-10-20 | End: 2022-10-27

## 2022-10-31 ENCOUNTER — OFFICE VISIT (OUTPATIENT)
Dept: ORTHOPEDIC SURGERY | Age: 57
End: 2022-10-31
Payer: COMMERCIAL

## 2022-10-31 DIAGNOSIS — Z98.890 HISTORY OF REVERSE TOTAL REPLACEMENT OF RIGHT SHOULDER JOINT: Primary | ICD-10-CM

## 2022-10-31 PROCEDURE — 99024 POSTOP FOLLOW-UP VISIT: CPT | Performed by: ORTHOPAEDIC SURGERY

## 2022-10-31 NOTE — PROGRESS NOTES
Tara Guerrero (: 1965) is a 60 y.o. female, patient, here for evaluation of the following chief complaint(s):  Shoulder Pain (Right shoulder reverse )       HPI:    Presents 1 week status post right reverse total shoulder replacement. She is overall doing very well. Pain is very well controlled. She has questions about a small spot of erythema at the distal aspect of her incision. Denies fevers or chills. Allergies   Allergen Reactions    Cigarette Smoke Other (comments)     Allergic to mold, pollen,cigarettes, causes burning eyes, headaches    Mold Other (comments)     Allergic to mold, pollen,cigarettes, causes burning eyes, headaches    Other Plant, Animal, Environmental Other (comments)     Pollen: Allergic to mold, pollen,cigarettes, causes burning eyes, headaches       Current Outpatient Medications   Medication Sig    ondansetron (ZOFRAN ODT) 4 mg disintegrating tablet Take 1 Tablet by mouth every eight (8) hours as needed for Nausea or Vomiting. (Patient not taking: Reported on 2021)    Biotin 2,500 mcg cap Take 5,000 mcg by mouth daily. potassium citrate (UROCIT-K10) 10 mEq (1,080 mg) TbER Take 10 mEq by mouth two (2) times a day. (Patient not taking: Reported on 2021)    tamsulosin (FLOMAX) 0.4 mg capsule Take 0.4 mg by mouth nightly as needed. (Patient not taking: Reported on 2021)    ketorolac (TORADOL) 10 mg tablet Take 10 mg by mouth as needed for Pain. (Patient not taking: Reported on 2021)    diclofenac (VOLTAREN) 1 % gel APPLY 2 GRAMS TO THE AFFECTED AREA(S) BY TOPICAL ROUTE 4 TIMES PER DAY (Patient not taking: Reported on 2021)    Venlafaxine-ER 24 HR (EFFEXOR-ER) 75 mg tr24 tablet Take 75 mg by mouth daily. iron, carbonyl (FEOSOL) 45 mg tab Take 1 Tab by mouth daily. docosahexanoic acid/epa (FISH OIL PO) Take 1,000 mg by mouth. CALCIUM PO Take 1,200 mg by mouth two (2) times a day.     cholecalciferol (VITAMIN D3) (2,000 UNITS /50 MCG) cap capsule Take  by mouth daily. glucosa carson 2KCl/chondroitin carson (GLUCOSAMINE-CHONDROITIN 3X STR PO) Take 1 Tablet by mouth daily. eletriptan (RELPAX) 40 mg tablet Take 40 mg by mouth as needed for Pain (Migraines). may repeat in 2 hours if necessary    pimecrolimus (ELIDEL) 1 % topical cream Apply  to affected area as needed. (Patient not taking: Reported on 12/9/2021)    cyanocobalamin (VITAMIN B-12) 1,000 mcg sublingual tablet Take 1,000 mcg by mouth every other day. ascorbic acid (VITAMIN C) 500 mg tablet Take 1,000 mg by mouth daily. MULTIVITAMIN PO Take 1 Tab by mouth two (2) times a day. USES CENTRUM     No current facility-administered medications for this visit. Past Medical History:   Diagnosis Date    Arthritis     KNEES. Depression     Hypertension     HX OF. NONE SINCE GBP IN 2011    Ill-defined condition     MIGRAINES    Joint pain     Kidney stones 2017    Menopause     Pt states she has not had a period in a year. Migraines     Morbid obesity (HCC)     LOST 80 PDS AFTER GBP SURGERY.     Psychiatric disorder     Anxiety    Sinusitis     Snoring         Past Surgical History:   Procedure Laterality Date    HX ACL RECONSTRUCTION  2009    RIGHT KNEE 2ND TIME    HX ACL RECONSTRUCTION  2008    RIGHT KNEE    HX BREAST REDUCTION Bilateral 12/5/2019    BILATERAL BREAST REDUCTION performed by Yasmeen Robin MD at [address]    HX CYST REMOVAL      Left ankle in 1999, right wrist 2008, and scalp    HX GASTRIC BYPASS  2011    GASTRIC BYPASS    HX GYN  2001    ABLATION    HX HEENT      Cyst removed from forehead    HX LAP CHOLECYSTECTOMY  10/18/13    by Dr Allison Cuadra,     [address] OTHER SURGICAL  2012    CYST REMOVED FROM WRIST    HX OTHER SURGICAL  2014    REMOVAL BONY CYST FOREHEAD    HX TONSILLECTOMY  1967    HX TUBAL LIGATION  1990    HX UROLOGICAL 2018    Kidney stone surgically removed    HX WISDOM TEETH EXTRACTION  1982    [place] ENDOMETRIAL ABLATION, THERMAL      10 years ago    [place] LAP GASTRIC BYPASS/BREANNA-EN-Y  09/21/11    liver biopsy,EGD Dr Yusra Townsend, [facility]       Family History   Problem Relation Age of Onset    Heart Disease Father     Heart Attack Father         early 63's    Heart Failure Father     Diabetes Brother     Hypertension Brother     Elevated Lipids Brother     Diabetes Maternal Grandfather     Seizures Son     Hypertension Mother     High Cholesterol Mother     Thyroid Disease Mother     Arthritis-rheumatoid Sister     Heart Failure Paternal Grandmother     Breast Cancer Maternal Aunt         age unknown    Hypertension Brother     High Cholesterol Brother     Anesth Problems Neg Hx         Social History     Socioeconomic History    Marital status:      Spouse name: Not on file    Number of children: Not on file    Years of education: Not on file    Highest education level: Not on file   Occupational History    Not on file   Tobacco Use    Smoking status: Never    Smokeless tobacco: Never   Vaping Use    Vaping Use: Never used   Substance and Sexual Activity    Alcohol use: No    Drug use: No    Sexual activity: Not Currently   Other Topics Concern    Not on file   Social History Narrative    Not on file     Social Determinants of Health     Financial Resource Strain: Not on file   Food Insecurity: Not on file   Transportation Needs: Not on file   Physical Activity: Not on file   Stress: Not on file   Social Connections: Not on file   Intimate Partner Violence: Not on file   Housing Stability: Not on file       Review of Systems   Musculoskeletal:         Post op reverse      Vitals:  LMP 09/27/2013    There is no height or weight on file to calculate BMI. Ortho Exam     Right shoulder: Surgical incision is healing well. There is small area of erythema surrounding distal aspect of the incision. There is no drainage. No open wound. Skin just simply appears to be a little excoriated as if it has been rubbing on something. It is not broken down or open. Ecchymosis throughout the bicep. Distally the extremity is neurovascular intact. X-rays of the right shoulder demonstrates reverse total shoulder arthroplasty in good position. There is no evidence of fractures. There is no evidence of hardware loosening or malposition. XR Results (most recent):  Results from Appointment encounter on 10/31/22    XR SHOULDER RT AP/LAT MIN 2 V    Narrative  Three-view x-ray of the right shoulder reveals a well-seated prosthesis no lucencies identified. ASSESSMENT/PLAN:    Patient is overall doing well 1 week out from her right reverse total shoulder arthroplasty. She is to keep an eye on her incision however do not see any active signs of infection at this time, this has more of a mechanical excoriated appearance. We discussed instructions as far as her sling usage. She was counseled to avoid any elective dental procedures. She is written a prescription for physical therapy. We will see her back in 4 weeks time.         Noman De Guzman MD

## 2022-10-31 NOTE — LETTER
10/31/2022    Patient: Mallory Kumar   YOB: 1965   Date of Visit: 10/31/2022     Tonya Dasilva MD  [address] [address]  Via Fax: [phone]    Dear Tonya Dasilva MD,      Thank you for referring Ms. Jojo Boo to [facility] for evaluation. My notes for this consultation are attached. If you have questions, please do not hesitate to call me. I look forward to following your patient along with you.       Sincerely,    Alexis Pearce MD

## 2022-11-01 ENCOUNTER — DOCUMENTATION ONLY (OUTPATIENT)
Dept: ORTHOPEDIC SURGERY | Age: 57
End: 2022-11-01

## 2022-11-28 ENCOUNTER — TRANSCRIBE ORDER (OUTPATIENT)
Dept: SCHEDULING | Age: 57
End: 2022-11-28

## 2022-11-28 ENCOUNTER — OFFICE VISIT (OUTPATIENT)
Dept: ORTHOPEDIC SURGERY | Age: 57
End: 2022-11-28
Payer: COMMERCIAL

## 2022-11-28 DIAGNOSIS — Z96.611 STATUS POST REVERSE TOTAL SHOULDER REPLACEMENT, RIGHT: Primary | ICD-10-CM

## 2022-11-28 DIAGNOSIS — Z12.31 VISIT FOR SCREENING MAMMOGRAM: Primary | ICD-10-CM

## 2022-11-28 PROCEDURE — 99024 POSTOP FOLLOW-UP VISIT: CPT | Performed by: ORTHOPAEDIC SURGERY

## 2022-11-28 NOTE — LETTER
11/28/2022    Patient: Devora Faye   YOB: 1965   Date of Visit: 11/28/2022     Claudia Valera MD  [address] [place] [address]  Via Fax: [phone]    Dear Claudia Valera MD,      Thank you for referring Ms. Dinah Collazo to [facility] for evaluation. My notes for this consultation are attached. If you have questions, please do not hesitate to call me. I look forward to following your patient along with you.       Sincerely,    Mariano Samayoa MD

## 2022-11-28 NOTE — PROGRESS NOTES
Wai Cavazos (: 1965) is a 60 y.o. female, patient, here for evaluation of the following chief complaint(s):  No chief complaint on file. HPI:    Patient returns for follow-up. This is her second postoperative visit after right reverse total shoulder replacement. Overall she is doing well. She has been going to physical therapy and has been working on range of motion. She still has a has some trouble sleeping because normally she is a stomach sleeper but not sleep on her back. She is off her pain medication. She has noted a little bit opening along her distal portion of her incision without drainage. Allergies   Allergen Reactions    Cigarette Smoke Other (comments)     Allergic to mold, pollen,cigarettes, causes burning eyes, headaches    Mold Other (comments)     Allergic to mold, pollen,cigarettes, causes burning eyes, headaches    Other Plant, Animal, Environmental Other (comments)     Pollen: Allergic to mold, pollen,cigarettes, causes burning eyes, headaches       Current Outpatient Medications   Medication Sig    ondansetron (ZOFRAN ODT) 4 mg disintegrating tablet Take 1 Tablet by mouth every eight (8) hours as needed for Nausea or Vomiting. (Patient not taking: Reported on 2021)    Biotin 2,500 mcg cap Take 5,000 mcg by mouth daily. potassium citrate (UROCIT-K10) 10 mEq (1,080 mg) TbER Take 10 mEq by mouth two (2) times a day. (Patient not taking: Reported on 2021)    tamsulosin (FLOMAX) 0.4 mg capsule Take 0.4 mg by mouth nightly as needed. (Patient not taking: Reported on 2021)    ketorolac (TORADOL) 10 mg tablet Take 10 mg by mouth as needed for Pain. (Patient not taking: Reported on 2021)    diclofenac (VOLTAREN) 1 % gel APPLY 2 GRAMS TO THE AFFECTED AREA(S) BY TOPICAL ROUTE 4 TIMES PER DAY (Patient not taking: Reported on 2021)    Venlafaxine-ER 24 HR (EFFEXOR-ER) 75 mg tr24 tablet Take 75 mg by mouth daily.     iron, carbonyl (FEOSOL) 45 mg tab Take 1 Tab by mouth daily. docosahexanoic acid/epa (FISH OIL PO) Take 1,000 mg by mouth. CALCIUM PO Take 1,200 mg by mouth two (2) times a day. cholecalciferol (VITAMIN D3) (2,000 UNITS /50 MCG) cap capsule Take  by mouth daily. glucosa carson 2KCl/chondroitin carson (GLUCOSAMINE-CHONDROITIN 3X STR PO) Take 1 Tablet by mouth daily. eletriptan (RELPAX) 40 mg tablet Take 40 mg by mouth as needed for Pain (Migraines). may repeat in 2 hours if necessary    pimecrolimus (ELIDEL) 1 % topical cream Apply  to affected area as needed. (Patient not taking: Reported on 12/9/2021)    cyanocobalamin (VITAMIN B-12) 1,000 mcg sublingual tablet Take 1,000 mcg by mouth every other day. ascorbic acid (VITAMIN C) 500 mg tablet Take 1,000 mg by mouth daily. MULTIVITAMIN PO Take 1 Tab by mouth two (2) times a day. USES CENTRUM     No current facility-administered medications for this visit. Past Medical History:   Diagnosis Date    Arthritis     KNEES. Depression     Hypertension     HX OF. NONE SINCE GBP IN 2011    Ill-defined condition     MIGRAINES    Joint pain     Kidney stones 2017    Menopause     Pt states she has not had a period in a year. Migraines     Morbid obesity (HCC)     LOST 80 PDS AFTER GBP SURGERY.     Psychiatric disorder     Anxiety    Sinusitis     Snoring         Past Surgical History:   Procedure Laterality Date    HX ACL RECONSTRUCTION  2009    RIGHT KNEE 2ND TIME    HX ACL RECONSTRUCTION  2008    RIGHT KNEE    HX BREAST REDUCTION Bilateral 12/5/2019    BILATERAL BREAST REDUCTION performed by Vitaly Gustafson MD at [address]    HX CYST REMOVAL      Left ankle in 1999, right wrist 2008, and scalp    HX GASTRIC BYPASS  2011    GASTRIC BYPASS    HX GYN  2001    ABLATION    HX HEENT      Cyst removed from forehead    HX LAP CHOLECYSTECTOMY  10/18/13    by Dr Cesario Brock    [facility]  2000 CYST REMOVED L ANKLE,     HX OTHER SURGICAL  2012    CYST REMOVED FROM WRIST    HX OTHER SURGICAL  2014    REMOVAL BONY CYST FOREHEAD    HX TONSILLECTOMY  1967    HX TUBAL LIGATION  1990    HX UROLOGICAL  2018    Kidney stone surgically removed    HX WISDOM TEETH EXTRACTION  1982    [place] ENDOMETRIAL ABLATION, THERMAL      10 years ago    [place] LAP GASTRIC BYPASS/BREANNA-EN-Y  09/21/11    liver biopsy,EGD Dr Kody Santos, [facility]       Family History   Problem Relation Age of Onset    Heart Disease Father     Heart Attack Father         early 63's    Heart Failure Father     Diabetes Brother     Hypertension Brother     Elevated Lipids Brother     Diabetes Maternal Grandfather     Seizures Son     Hypertension Mother     High Cholesterol Mother     Thyroid Disease Mother     Arthritis-rheumatoid Sister     Heart Failure Paternal Grandmother     Breast Cancer Maternal Aunt         age unknown    Hypertension Brother     High Cholesterol Brother     Anesth Problems Neg Hx         Social History     Socioeconomic History    Marital status:      Spouse name: Not on file    Number of children: Not on file    Years of education: Not on file    Highest education level: Not on file   Occupational History    Not on file   Tobacco Use    Smoking status: Never    Smokeless tobacco: Never   Vaping Use    Vaping Use: Never used   Substance and Sexual Activity    Alcohol use: No    Drug use: No    Sexual activity: Not Currently   Other Topics Concern    Not on file   Social History Narrative    Not on file     Social Determinants of Health     Financial Resource Strain: Not on file   Food Insecurity: Not on file   Transportation Needs: Not on file   Physical Activity: Not on file   Stress: Not on file   Social Connections: Not on file   Intimate Partner Violence: Not on file   Housing Stability: Not on file       Review of Systems    Vitals:  [facility] 09/27/2013    There is no height or weight on file to calculate BMI.     Ortho Exam Right shoulder: Surgical incision is showing good signs of healing aside from the most distal portion. There is a 5 mm area of what appears to be spitting Vicryl. No drainage is noted at this location. Active forward flexion to 110 degrees. External rotation 30 degrees. Strength was not tested today. Distally the extremity is neurovascular intact. ASSESSMENT/PLAN:    Patient is doing well after her right reverse total shoulder replacement. Distal incision is clean this is most likely secondary to spitting Vicryl. She is to keep a close eye on this and call me if it worsens. She is now 5 weeks postop. She can discontinue use of her sling. She should continue with physical therapy and progress to active range of motion and light strengthening. We will write her a note to be out of work for the next month until she is seen again.         Carolyne Sullivan MD

## 2022-12-06 ENCOUNTER — DOCUMENTATION ONLY (OUTPATIENT)
Dept: ORTHOPEDIC SURGERY | Age: 57
End: 2022-12-06

## 2022-12-28 ENCOUNTER — OFFICE VISIT (OUTPATIENT)
Dept: ORTHOPEDIC SURGERY | Age: 57
End: 2022-12-28
Payer: COMMERCIAL

## 2022-12-28 DIAGNOSIS — Z96.611 STATUS POST REVERSE TOTAL SHOULDER REPLACEMENT, RIGHT: Primary | ICD-10-CM

## 2022-12-28 PROCEDURE — 99024 POSTOP FOLLOW-UP VISIT: CPT | Performed by: ORTHOPAEDIC SURGERY

## 2022-12-28 NOTE — PROGRESS NOTES
Joanna Webb (: 1965) is a 59 y.o. female, patient, here for evaluation of the following chief complaint(s):  Shoulder Pain (Right shoulder )       HPI:    Patient presents the office today status post reverse total shoulder replacement of the right. Patient states she is doing well. She is been attending physical therapy on a regular basis. Allergies   Allergen Reactions    Cigarette Smoke Other (comments)     Allergic to mold, pollen,cigarettes, causes burning eyes, headaches    Mold Other (comments)     Allergic to mold, pollen,cigarettes, causes burning eyes, headaches    Other Plant, Animal, Environmental Other (comments)     Pollen: Allergic to mold, pollen,cigarettes, causes burning eyes, headaches       Current Outpatient Medications   Medication Sig    ondansetron (ZOFRAN ODT) 4 mg disintegrating tablet Take 1 Tablet by mouth every eight (8) hours as needed for Nausea or Vomiting. (Patient not taking: Reported on 2021)    Biotin 2,500 mcg cap Take 5,000 mcg by mouth daily. potassium citrate (UROCIT-K10) 10 mEq (1,080 mg) TbER Take 10 mEq by mouth two (2) times a day. (Patient not taking: Reported on 2021)    tamsulosin (FLOMAX) 0.4 mg capsule Take 0.4 mg by mouth nightly as needed. (Patient not taking: Reported on 2021)    ketorolac (TORADOL) 10 mg tablet Take 10 mg by mouth as needed for Pain. (Patient not taking: Reported on 2021)    diclofenac (VOLTAREN) 1 % gel APPLY 2 GRAMS TO THE AFFECTED AREA(S) BY TOPICAL ROUTE 4 TIMES PER DAY (Patient not taking: Reported on 2021)    Venlafaxine-ER 24 HR (EFFEXOR-ER) 75 mg tr24 tablet Take 75 mg by mouth daily. iron, carbonyl (FEOSOL) 45 mg tab Take 1 Tab by mouth daily. docosahexanoic acid/epa (FISH OIL PO) Take 1,000 mg by mouth. CALCIUM PO Take 1,200 mg by mouth two (2) times a day. cholecalciferol (VITAMIN D3) (2,000 UNITS /50 MCG) cap capsule Take  by mouth daily.     glucosa carson 2KCl/chondroitin carson (GLUCOSAMINE-CHONDROITIN 3X STR PO) Take 1 Tablet by mouth daily. eletriptan (RELPAX) 40 mg tablet Take 40 mg by mouth as needed for Pain (Migraines). may repeat in 2 hours if necessary    pimecrolimus (ELIDEL) 1 % topical cream Apply  to affected area as needed. (Patient not taking: Reported on 12/9/2021)    cyanocobalamin (VITAMIN B-12) 1,000 mcg sublingual tablet Take 1,000 mcg by mouth every other day. ascorbic acid (VITAMIN C) 500 mg tablet Take 1,000 mg by mouth daily. MULTIVITAMIN PO Take 1 Tab by mouth two (2) times a day. USES CENTRUM     No current facility-administered medications for this visit. Past Medical History:   Diagnosis Date    Arthritis     KNEES. Depression     Hypertension     HX OF. NONE SINCE GBP IN 2011    Ill-defined condition     MIGRAINES    Joint pain     Kidney stones 2017    Menopause     Pt states she has not had a period in a year. Migraines     Morbid obesity (HCC)     LOST 80 PDS AFTER GBP SURGERY.     Psychiatric disorder     Anxiety    Sinusitis     Snoring         Past Surgical History:   Procedure Laterality Date    HX ACL RECONSTRUCTION  2009    RIGHT KNEE 2ND TIME    HX ACL RECONSTRUCTION  2008    RIGHT KNEE    HX BREAST REDUCTION Bilateral 12/5/2019    BILATERAL BREAST REDUCTION performed by Mal Gaona MD at [address]    HX CYST REMOVAL      Left ankle in 1999, right wrist 2008, and scalp    HX GASTRIC BYPASS  2011    GASTRIC BYPASS    HX GYN  2001    ABLATION    HX HEENT      Cyst removed from forehead    HX LAP CHOLECYSTECTOMY  10/18/13    by Dr Cristopher Gee,     HX OTHER SURGICAL  2012    CYST REMOVED FROM WRIST    HX OTHER SURGICAL  2014    REMOVAL BONY CYST FOREHEAD    HX TONSILLECTOMY  1967    HX TUBAL LIGATION  1990    HX UROLOGICAL  2018    Kidney stone surgically removed    HX WISDOM TEETH EXTRACTION  1982    [place] ENDOMETRIAL ABLATION, THERMAL      10 years ago    [place] LAP GASTRIC BYPASS/BREANNA-EN-Y  09/21/11    liver biopsy,EGD Dr Cristofer Tracey, [facility]       Family History   Problem Relation Age of Onset    Heart Disease Father     Heart Attack Father         early 63's    Heart Failure Father     Diabetes Brother     Hypertension Brother     Elevated Lipids Brother     Diabetes Maternal Grandfather     Seizures Son     Hypertension Mother     High Cholesterol Mother     Thyroid Disease Mother     Arthritis-rheumatoid Sister     Heart Failure Paternal Grandmother     Breast Cancer Maternal Aunt         age unknown    Hypertension Brother     High Cholesterol Brother     Anesth Problems Neg Hx         Social History     Socioeconomic History    Marital status:      Spouse name: Not on file    Number of children: Not on file    Years of education: Not on file    Highest education level: Not on file   Occupational History    Not on file   Tobacco Use    Smoking status: Never    Smokeless tobacco: Never   Vaping Use    Vaping Use: Never used   Substance and Sexual Activity    Alcohol use: No    Drug use: No    Sexual activity: Not Currently   Other Topics Concern    Not on file   Social History Narrative    Not on file     Social Determinants of Health     Financial Resource Strain: Not on file   Food Insecurity: Not on file   Transportation Needs: Not on file   Physical Activity: Not on file   Stress: Not on file   Social Connections: Not on file   Intimate Partner Violence: Not on file   Housing Stability: Not on file       Review of Systems   Musculoskeletal:         Right shoulder      Vitals:  LMP 09/27/2013    There is no height or weight on file to calculate BMI. Ortho Exam     Right shoulder: Incision is healed. Patient has 160 degrees of forward elevation, 120 degrees of lateral abduction and 65 degrees of external rotation. Internal rotation is to her greater troch.   She has minimal to no pain and no crepitation with manipulation of the shoulder. Neurovascular examination is intact. ASSESSMENT/PLAN:    Patient is progressing well. I would recommend another 4 weeks of physical therapy. 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 I certainly would not recommend any pushing or pulling but if she could do low-level work I think it is reasonable. Otherwise, she is to be out of work for another 4 weeks and return to the office at that time.         Sherlyn Mari MD

## 2022-12-28 NOTE — LETTER
12/28/2022    Patient: Yovani Almanzar   YOB: 1965   Date of Visit: 12/28/2022     Aysha Foss MD  [address] [address]  Via Fax: [phone]    Dear Aysha Foss MD,      Thank you for referring Ms. Zain Veras to [facility] for evaluation. My notes for this consultation are attached. If you have questions, please do not hesitate to call me. I look forward to following your patient along with you.       Sincerely,    Noman De Guzman MD

## 2023-01-04 ENCOUNTER — DOCUMENTATION ONLY (OUTPATIENT)
Dept: ORTHOPEDIC SURGERY | Age: 58
End: 2023-01-04

## 2023-01-25 ENCOUNTER — OFFICE VISIT (OUTPATIENT)
Dept: ORTHOPEDIC SURGERY | Age: 58
End: 2023-01-25
Payer: COMMERCIAL

## 2023-01-25 DIAGNOSIS — M25.511 CHRONIC RIGHT SHOULDER PAIN: Primary | ICD-10-CM

## 2023-01-25 DIAGNOSIS — M75.112 NONTRAUMATIC INCOMPLETE TEAR OF LEFT ROTATOR CUFF: ICD-10-CM

## 2023-01-25 DIAGNOSIS — G89.29 CHRONIC RIGHT SHOULDER PAIN: Primary | ICD-10-CM

## 2023-01-25 PROCEDURE — 99213 OFFICE O/P EST LOW 20 MIN: CPT | Performed by: ORTHOPAEDIC SURGERY

## 2023-01-25 NOTE — LETTER
1/25/2023    Patient: Mariluz Badillo   YOB: 1965   Date of Visit: 1/25/2023     Shira Handy MD  [address] [address]  Via Fax: [phone]    Dear Shira Handy MD,      Thank you for referring Ms. Satya Guerrero to [facility] for evaluation. My notes for this consultation are attached. If you have questions, please do not hesitate to call me. I look forward to following your patient along with you.       Sincerely,    Herlinda Angela MD

## 2023-01-25 NOTE — PROGRESS NOTES
Magdalene Tao (: 1965) is a 59 y.o. female, patient, here for evaluation of the following chief complaint(s):  Shoulder Pain (Right shoulder )       HPI:    Patient returns to the office status post reverse total shoulder posterior of the right. She is doing quite well. She is been very happy with the progress. Unfortunate, her left shoulder continues to be problematic. She states she has had a prior work-up for left shoulder pain. Since her surgery, her left shoulders become more problematic    Allergies   Allergen Reactions    Cigarette Smoke Other (comments)     Allergic to mold, pollen,cigarettes, causes burning eyes, headaches    Mold Other (comments)     Allergic to mold, pollen,cigarettes, causes burning eyes, headaches    Other Plant, Animal, Environmental Other (comments)     Pollen: Allergic to mold, pollen,cigarettes, causes burning eyes, headaches       Current Outpatient Medications   Medication Sig    ondansetron (ZOFRAN ODT) 4 mg disintegrating tablet Take 1 Tablet by mouth every eight (8) hours as needed for Nausea or Vomiting. (Patient not taking: Reported on 2021)    Biotin 2,500 mcg cap Take 5,000 mcg by mouth daily. potassium citrate (UROCIT-K10) 10 mEq (1,080 mg) TbER Take 10 mEq by mouth two (2) times a day. (Patient not taking: Reported on 2021)    tamsulosin (FLOMAX) 0.4 mg capsule Take 0.4 mg by mouth nightly as needed. (Patient not taking: Reported on 2021)    ketorolac (TORADOL) 10 mg tablet Take 10 mg by mouth as needed for Pain. (Patient not taking: Reported on 2021)    diclofenac (VOLTAREN) 1 % gel APPLY 2 GRAMS TO THE AFFECTED AREA(S) BY TOPICAL ROUTE 4 TIMES PER DAY (Patient not taking: Reported on 2021)    Venlafaxine-ER 24 HR (EFFEXOR-ER) 75 mg tr24 tablet Take 75 mg by mouth daily. iron, carbonyl (FEOSOL) 45 mg tab Take 1 Tab by mouth daily. docosahexanoic acid/epa (FISH OIL PO) Take 1,000 mg by mouth.     CALCIUM PO Take 1,200 mg by mouth two (2) times a day. cholecalciferol (VITAMIN D3) (2,000 UNITS /50 MCG) cap capsule Take  by mouth daily. glucosa carson 2KCl/chondroitin carson (GLUCOSAMINE-CHONDROITIN 3X STR PO) Take 1 Tablet by mouth daily. eletriptan (RELPAX) 40 mg tablet Take 40 mg by mouth as needed for Pain (Migraines). may repeat in 2 hours if necessary    pimecrolimus (ELIDEL) 1 % topical cream Apply  to affected area as needed. (Patient not taking: Reported on 12/9/2021)    cyanocobalamin (VITAMIN B-12) 1,000 mcg sublingual tablet Take 1,000 mcg by mouth every other day. ascorbic acid (VITAMIN C) 500 mg tablet Take 1,000 mg by mouth daily. MULTIVITAMIN PO Take 1 Tab by mouth two (2) times a day. USES CENTRUM     No current facility-administered medications for this visit. Past Medical History:   Diagnosis Date    Arthritis     KNEES. Depression     Hypertension     HX OF. NONE SINCE GBP IN 2011    Ill-defined condition     MIGRAINES    Joint pain     Kidney stones 2017    Menopause     Pt states she has not had a period in a year. Migraines     Morbid obesity (HCC)     LOST 80 PDS AFTER GBP SURGERY.     Psychiatric disorder     Anxiety    Sinusitis     Snoring         Past Surgical History:   Procedure Laterality Date    HX ACL RECONSTRUCTION  2009    RIGHT KNEE 2ND TIME    HX ACL RECONSTRUCTION  2008    RIGHT KNEE    HX BREAST REDUCTION Bilateral 12/5/2019    BILATERAL BREAST REDUCTION performed by Erika Anders MD at [facility]    HX CYST REMOVAL      Left ankle in 1999, right wrist 2008, and scalp    HX GASTRIC BYPASS  2011    GASTRIC BYPASS    HX GYN  2001    ABLATION    HX HEENT      Cyst removed from forehead    HX LAP CHOLECYSTECTOMY  10/18/13    by Dr Michael Elkins,     HX OTHER SURGICAL  2012    CYST REMOVED FROM WRIST    HX OTHER SURGICAL  2014    REMOVAL BONY CYST FOREHEAD    HX TONSILLECTOMY  1967    HX TUBAL LIGATION  1990    HX UROLOGICAL  2018    Kidney stone surgically removed    HX WISDOM TEETH EXTRACTION  1982    NE ENDOMETRIAL ABLATION, THERMAL      10 years ago    NE LAP GASTRIC BYPASS/BREANNA-EN-Y  09/21/11    liver biopsy,EGD Dr Silvia Bautista, [facility]       Family History   Problem Relation Age of Onset    Heart Disease Father     Heart Attack Father         early 63's    Heart Failure Father     Diabetes Brother     Hypertension Brother     Elevated Lipids Brother     Diabetes Maternal Grandfather     Seizures Son     Hypertension Mother     High Cholesterol Mother     Thyroid Disease Mother     Arthritis-rheumatoid Sister     Heart Failure Paternal Grandmother     Breast Cancer Maternal Aunt         age unknown    Hypertension Brother     High Cholesterol Brother     Anesth Problems Neg Hx         Social History     Socioeconomic History    Marital status:      Spouse name: Not on file    Number of children: Not on file    Years of education: Not on file    Highest education level: Not on file   Occupational History    Not on file   Tobacco Use    Smoking status: Never    Smokeless tobacco: Never   Vaping Use    Vaping Use: Never used   Substance and Sexual Activity    Alcohol use: No    Drug use: No    Sexual activity: Not Currently   Other Topics Concern    Not on file   Social History Narrative    Not on file     Social Determinants of Health     Financial Resource Strain: Not on file   Food Insecurity: Not on file   Transportation Needs: Not on file   Physical Activity: Not on file   Stress: Not on file   Social Connections: Not on file   Intimate Partner Violence: Not on file   Housing Stability: Not on file       Review of Systems   Musculoskeletal:         Right shoulder      Vitals:  [facility] 09/27/2013    There is no height or weight on file to calculate BMI. Ortho Exam     Right shoulder: Incision is healed.   She has 160 degrees of forward elevation, 100 degrees lateral duction 60 degrees of external rotation. Internal rotation is to her SI joint. She has no pain with manipulation of her shoulder. Neurovascular examination is intact. Left shoulder:  No shoulder girdle atrophy. There is no soft tissue swelling, ecchymosis, abrasions or lacerations. Active range of motion of the shoulder is limited to 130° of forward flexion, 120° of lateral abduction and 70° of external rotation. Internal rotation is to the SI joint and is painful. Passive range of motion is full with a painful impingement sign and painful Rocha sign. Rotator cuff strength is painful to evaluate and is at 4+/5 with forward flexion and lateral abduction. External rotational strength is maintained. There is no crepitation about the joint. Palpation of the [facility] joint does not reproduce discomfort and there is no pain elicited with cross-body adduction. Strength of the extremity is 5/5 strength at biceps/triceps/wrist extension and is comparable to the contralateral side. DTR's are intact at +2/4 and are symmetrical.  Cervical range of motion is full with no pain to palpation along the paraspinal musculature or medial border of the scapula. Spurling's sign is negative. ASSESSMENT/PLAN:    Patient returns to the office she is done quite well status post reverse total shoulder placement of the right. I would believe she has another 2-3 visits of physical therapy and then should be discharged to a physician directed home exercise program.  I reminded her to avoid elective dental procedures. She is to return to the office in 3 months for repeat x-ray. In regards to the left shoulder, she does have some level of on and off discomfort of the shoulder. At this stage, we will revisit this. If her symptoms were to become more painful then I would suggest either the follow-up with her right shoulder in 3 months or an earlier appointment if the pain worsens.         Favio Liz MD

## 2023-01-27 ENCOUNTER — DOCUMENTATION ONLY (OUTPATIENT)
Dept: ORTHOPEDIC SURGERY | Age: 58
End: 2023-01-27

## 2023-02-03 ENCOUNTER — DOCUMENTATION ONLY (OUTPATIENT)
Dept: ORTHOPEDIC SURGERY | Age: 58
End: 2023-02-03

## 2023-02-20 ENCOUNTER — HOSPITAL ENCOUNTER (OUTPATIENT)
Dept: MAMMOGRAPHY | Age: 58
Discharge: HOME | End: 2023-02-20
Attending: FAMILY MEDICINE
Payer: COMMERCIAL

## 2023-02-20 DIAGNOSIS — Z12.31 VISIT FOR SCREENING MAMMOGRAM: ICD-10-CM

## 2023-02-20 PROCEDURE — 77063 BREAST TOMOSYNTHESIS BI: CPT

## 2023-03-22 ENCOUNTER — TRANSCRIBE ORDER (OUTPATIENT)
Dept: SCHEDULING | Age: 58
End: 2023-03-22

## 2023-03-22 DIAGNOSIS — Z13.820 SCREENING FOR OSTEOPOROSIS: Primary | ICD-10-CM

## 2023-04-23 DIAGNOSIS — Z13.820 SCREENING FOR OSTEOPOROSIS: Primary | ICD-10-CM

## 2023-04-26 ENCOUNTER — OFFICE VISIT (OUTPATIENT)
Dept: ORTHOPEDIC SURGERY | Age: 58
End: 2023-04-26
Payer: COMMERCIAL

## 2023-04-26 DIAGNOSIS — M25.511 ACUTE PAIN OF RIGHT SHOULDER: Primary | ICD-10-CM

## 2023-04-26 DIAGNOSIS — M25.511 CHRONIC RIGHT SHOULDER PAIN: ICD-10-CM

## 2023-04-26 DIAGNOSIS — G89.29 CHRONIC RIGHT SHOULDER PAIN: ICD-10-CM

## 2023-04-26 DIAGNOSIS — Z96.611 STATUS POST REVERSE TOTAL SHOULDER REPLACEMENT, RIGHT: Primary | ICD-10-CM

## 2023-04-26 PROCEDURE — 99213 OFFICE O/P EST LOW 20 MIN: CPT | Performed by: ORTHOPAEDIC SURGERY

## 2023-04-26 RX ORDER — AMOXICILLIN 500 MG/1
CAPSULE ORAL
Qty: 2 CAPSULE | Refills: 4 | Status: SHIPPED | OUTPATIENT
Start: 2023-04-26

## 2023-04-26 NOTE — PROGRESS NOTES
Colon Nissen (: 1965) is a 59 y.o. female, patient, here for evaluation of the following chief complaint(s):  Shoulder Pain (Right shoulder )       HPI:    Patient presents the office 6 months status post reverse total shoulder placement of the right. She is doing quite well. She is been very pleased and happy with her range of motion and strength. She denies numbness or tingling she denies nighttime pain. Allergies   Allergen Reactions    Cigarette Smoke Other (comments)     Allergic to mold, pollen,cigarettes, causes burning eyes, headaches    Mold Other (comments)     Allergic to mold, pollen,cigarettes, causes burning eyes, headaches    Other Plant, Animal, Environmental Other (comments)     Pollen: Allergic to mold, pollen,cigarettes, causes burning eyes, headaches       Current Outpatient Medications   Medication Sig    ondansetron (ZOFRAN ODT) 4 mg disintegrating tablet Take 1 Tablet by mouth every eight (8) hours as needed for Nausea or Vomiting. (Patient not taking: Reported on 2021)    Biotin 2,500 mcg cap Take 5,000 mcg by mouth daily. potassium citrate (UROCIT-K10) 10 mEq (1,080 mg) TbER Take 10 mEq by mouth two (2) times a day. (Patient not taking: Reported on 2021)    tamsulosin (FLOMAX) 0.4 mg capsule Take 0.4 mg by mouth nightly as needed. (Patient not taking: Reported on 2021)    ketorolac (TORADOL) 10 mg tablet Take 10 mg by mouth as needed for Pain. (Patient not taking: Reported on 2021)    diclofenac (VOLTAREN) 1 % gel APPLY 2 GRAMS TO THE AFFECTED AREA(S) BY TOPICAL ROUTE 4 TIMES PER DAY (Patient not taking: Reported on 2021)    Venlafaxine-ER 24 HR (EFFEXOR-ER) 75 mg tr24 tablet Take 75 mg by mouth daily. iron, carbonyl (FEOSOL) 45 mg tab Take 1 Tab by mouth daily. docosahexanoic acid/epa (FISH OIL PO) Take 1,000 mg by mouth. CALCIUM PO Take 1,200 mg by mouth two (2) times a day.     cholecalciferol (VITAMIN D3) (2,000 UNITS /50 MCG) cap capsule Take  by mouth daily. glucosa carson 2KCl/chondroitin carson (GLUCOSAMINE-CHONDROITIN 3X STR PO) Take 1 Tablet by mouth daily. eletriptan (RELPAX) 40 mg tablet Take 40 mg by mouth as needed for Pain (Migraines). may repeat in 2 hours if necessary    pimecrolimus (ELIDEL) 1 % topical cream Apply  to affected area as needed. (Patient not taking: Reported on 12/9/2021)    cyanocobalamin (VITAMIN B-12) 1,000 mcg sublingual tablet Take 1,000 mcg by mouth every other day. ascorbic acid (VITAMIN C) 500 mg tablet Take 1,000 mg by mouth daily. MULTIVITAMIN PO Take 1 Tab by mouth two (2) times a day. USES C[facility]     No current facility-administered medications for this visit. Past Medical History:   Diagnosis Date    Arthritis     KNEES. Depression     Hypertension     HX OF. NONE SINCE GBP IN 2011    Ill-defined condition     MIGRAINES    Joint pain     Kidney stones 2017    Menopause     Pt states she has not had a period in a year. Migraines     Morbid obesity (HCC)     LOST 80 PDS AFTER GBP SURGERY.     Psychiatric disorder     Anxiety    Sinusitis     Snoring         Past Surgical History:   Procedure Laterality Date    HX ACL RECONSTRUCTION  2009    RIGHT KNEE 2ND TIME    HX ACL RECONSTRUCTION  2008    RIGHT KNEE    HX BREAST REDUCTION Bilateral 12/5/2019    BILATERAL BREAST REDUCTION performed by Lorenzo Beck MD at [address]    HX CYST REMOVAL      Left ankle in 1999, right wrist 2008, and scalp    HX GASTRIC BYPASS  2011    GASTRIC BYPASS    HX GYN  2001    ABLATION    HX HEENT      Cyst removed from forehead    HX LAP CHOLECYSTECTOMY  10/18/13    by Dr Priti Allen,     [address] OTHER SURGICAL  2012    CYST REMOVED FROM WRIST    HX OTHER SURGICAL  2014    REMOVAL BONY CYST FOREHEAD    HX TONSILLECTOMY  1967    HX TUBAL LIGATION  1990    HX UROLOGICAL 2018    Kidney stone surgically removed    HX WISDOM TEETH EXTRACTION  1982    MI ENDOMETRIAL ABLTJ THERMAL W/O HYSTEROSCOPIC GUID      10 years ago    MI LAP GASTRIC BYPASS/BREANNA-EN-Y  09/21/11    liver biopsy,EGD Dr Abilio Gregory, [facility]       Family History   Problem Relation Age of Onset    Heart Disease Father     Heart Attack Father         early 63's    Heart Failure Father     Diabetes Brother     Hypertension Brother     Elevated Lipids Brother     Diabetes Maternal Grandfather     Seizures Son     Hypertension Mother     High Cholesterol Mother     Thyroid Disease Mother     Arthritis-rheumatoid Sister     Heart Failure Paternal Grandmother     Breast Cancer Maternal Aunt         age unknown    Hypertension Brother     High Cholesterol Brother     Anesth Problems Neg Hx         Social History     Socioeconomic History    Marital status:      Spouse name: Not on file    Number of children: Not on file    Years of education: Not on file    Highest education level: Not on file   Occupational History    Not on file   Tobacco Use    Smoking status: Never    Smokeless tobacco: Never   Vaping Use    Vaping Use: Never used   Substance and Sexual Activity    Alcohol use: No    Drug use: No    Sexual activity: Not Currently   Other Topics Concern    Not on file   Social History Narrative    Not on file     Social Determinants of Health     Financial Resource Strain: Not on file   Food Insecurity: Not on file   Transportation Needs: Not on file   Physical Activity: Not on file   Stress: Not on file   Social Connections: Not on file   Intimate Partner Violence: Not on file   Housing Stability: Not on file       Review of Systems   Musculoskeletal:         Right shoulder     Vitals:  [facility] 09/27/2013    There is no height or weight on file to calculate BMI. Ortho Exam     Patient is alert and oriented x3. Patient is in no acute distress. Patient ambulates with a nonantalgic gait. Right shoulder: Incision is healed. She has 170 degrees of forward elevation, 120 degrees lateral duction and 60 degrees of external rotation. Internal rotation is to her SI joint. She has no pain with manipulation of her shoulder no crepitation. Strength is normal.  Neurovascular examination is intact. Left shoulder: Patient has limited range of motion to about 130 degrees of forward elevation she does have some mild pain. She has near normal strength throughout range of motion. Neurovascular examination is intact. XR Results (most recent):  Results from Appointment encounter on 04/26/23    XR SHOULDER RT AP/LAT MIN 2 V    Narrative  Three-view x-ray of the right shoulder reveals a well-seated prosthesis no lucencies identified. ASSESSMENT/PLAN:    Patient is doing very well status post reverse total shoulder replacement of the right. I have asked her to continue with range of motion exercises particularly internal rotation as she most likely will continue to improve. I would like her to return to the office in 6 months for her 1 year x-ray check. If she develops any unusual pain or swelling she is to contact the office immediately. I reminded her to take antibiotics prior to dental procedures and will be sending a prescription to her pharmacy.         Kathrine Jean MD

## 2024-02-19 ENCOUNTER — TRANSCRIBE ORDERS (OUTPATIENT)
Facility: HOSPITAL | Age: 59
End: 2024-02-19

## 2024-02-19 ENCOUNTER — HOSPITAL ENCOUNTER (OUTPATIENT)
Facility: HOSPITAL | Age: 59
Discharge: HOME | End: 2024-02-22
Payer: COMMERCIAL

## 2024-02-19 DIAGNOSIS — Z12.31 OTHER SCREENING MAMMOGRAM: Primary | ICD-10-CM

## 2024-02-19 DIAGNOSIS — Z12.31 OTHER SCREENING MAMMOGRAM: ICD-10-CM

## 2024-02-19 PROCEDURE — 77063 BREAST TOMOSYNTHESIS BI: CPT

## 2025-03-31 ENCOUNTER — TRANSCRIBE ORDERS (OUTPATIENT)
Facility: HOSPITAL | Age: 60
End: 2025-03-31

## 2025-03-31 DIAGNOSIS — Z12.31 ENCOUNTER FOR SCREENING MAMMOGRAM FOR BREAST CANCER: Primary | ICD-10-CM

## 2025-06-19 ENCOUNTER — HOSPITAL ENCOUNTER (OUTPATIENT)
Facility: HOSPITAL | Age: 60
Discharge: HOME | End: 2025-06-22
Payer: COMMERCIAL

## 2025-06-19 DIAGNOSIS — Z12.31 ENCOUNTER FOR SCREENING MAMMOGRAM FOR BREAST CANCER: ICD-10-CM

## 2025-06-19 PROCEDURE — 77063 BREAST TOMOSYNTHESIS BI: CPT

## 2025-07-31 ENCOUNTER — TRANSCRIBE ORDERS (OUTPATIENT)
Facility: HOSPITAL | Age: 60
End: 2025-07-31

## 2025-07-31 DIAGNOSIS — Z13.820 SCREENING FOR OSTEOPOROSIS: Primary | ICD-10-CM

## 2025-08-06 ENCOUNTER — HOSPITAL ENCOUNTER (OUTPATIENT)
Facility: HOSPITAL | Age: 60
Discharge: HOME | End: 2025-08-09
Payer: COMMERCIAL

## 2025-08-06 DIAGNOSIS — Z13.820 SCREENING FOR OSTEOPOROSIS: ICD-10-CM

## 2025-08-06 PROCEDURE — 77080 DXA BONE DENSITY AXIAL: CPT

## (undated) DEVICE — GAUZE SPONGES,12 PLY: Brand: CURITY

## (undated) DEVICE — 4-PORT MANIFOLD: Brand: NEPTUNE 2

## (undated) DEVICE — REM POLYHESIVE ADULT PATIENT RETURN ELECTRODE: Brand: VALLEYLAB

## (undated) DEVICE — GENERAL LAPAROSCOPY - SMH: Brand: MEDLINE INDUSTRIES, INC.

## (undated) DEVICE — STERILE POLYISOPRENE POWDER-FREE SURGICAL GLOVES WITH EMOLLIENT COATING: Brand: PROTEXIS

## (undated) DEVICE — 3M™ TEGADERM™ TRANSPARENT FILM DRESSING FRAME STYLE, 1626W, 4 IN X 4-3/4 IN (10 CM X 12 CM), 50/CT 4CT/CASE: Brand: 3M™ TEGADERM™

## (undated) DEVICE — PLASMABLADE PS210-030S 3.0S LOCK: Brand: PLASMABLADE™

## (undated) DEVICE — GLOVE SURG SZ 75 L1212IN FNGR THK138MIL BRN LTX FREE

## (undated) DEVICE — Z DISCONTINUED USE (MFG CAT 7984-37) SOLUTION IV SODIUM CHL 0.9% 100 ML INJ

## (undated) DEVICE — CLICKLINE SCISSORS INSERT: Brand: CLICKLINE

## (undated) DEVICE — DRAPE FLD WRM W44XL66IN C6L FOR INTRATEMP SYS THERMABASIN

## (undated) DEVICE — TROCAR: Brand: KII® SLEEVE

## (undated) DEVICE — SOLUTION IRRIG 1000ML H2O STRL BLT

## (undated) DEVICE — STERILE POLYISOPRENE POWDER-FREE SURGICAL GLOVES: Brand: PROTEXIS

## (undated) DEVICE — INFECTION CONTROL KIT SYS

## (undated) DEVICE — SUTURE MCRYL SZ 4-0 L27IN ABSRB UD L19MM PS-2 1/2 CIR PRIM Y426H

## (undated) DEVICE — OPEN-END URETERAL CATHETER: Brand: COOK

## (undated) DEVICE — BANDAGE COBAN 4 IN COMPR W4INXL5YD FOAM COHESIVE QUIK STK SELF ADH SFT

## (undated) DEVICE — Z DISCONTINUED PER MEDLINE PACK PROCEDURE SURG PLAS

## (undated) DEVICE — JELLY,LUBE,STERILE,FLIP TOP,TUBE,4-OZ: Brand: MEDLINE

## (undated) DEVICE — TUBING, SUCTION, 1/4" X 10', STRAIGHT: Brand: MEDLINE

## (undated) DEVICE — DERMABOND SKIN ADH 0.7ML -- DERMABOND ADVANCED 12/BX

## (undated) DEVICE — SOLUTION IRRIG 3000ML 0.9% SOD CHL FLX CONT 0797208] ICU MEDICAL INC]

## (undated) DEVICE — SKIN MARKER,REGULAR TIP WITH RULER AND LABELS: Brand: DEVON

## (undated) DEVICE — Device

## (undated) DEVICE — CURITY NON-ADHERENT STRIPS: Brand: CURITY

## (undated) DEVICE — GARMENT,MEDLINE,DVT,INT,CALF,MED, GEN2: Brand: MEDLINE

## (undated) DEVICE — SOLUTION IRRIGATION H2O 0797305] ICU MEDICAL INC]

## (undated) DEVICE — MEDI-VAC NON-CONDUCTIVE SUCTION TUBING: Brand: CARDINAL HEALTH

## (undated) DEVICE — SUTURE VCRL SZ 0 L27IN ABSRB UD SH L26MM 1/2 CIR TAPERPOINT J418H

## (undated) DEVICE — (D)SYR 10ML 1/5ML GRAD NSAF -- PKGING CHANGE USE ITEM 338027

## (undated) DEVICE — LAPAROSCOPIC TROCAR SLEEVE/SINGLE USE: Brand: KII® OPTICAL ACCESS SYSTEM

## (undated) DEVICE — SOLUTION SCRB 4OZ 10% PVP I POVIDONE IOD TOP PAINT EXIDINE

## (undated) DEVICE — SPONGE GZ W4XL4IN COT 12 PLY TYP VII WVN C FLD DSGN

## (undated) DEVICE — DISSECTOR ULTRASONIC L39CM CRV JAW CRDLSS SONICISION

## (undated) DEVICE — NEEDLE HYPO 22GA L1.5IN BLK S STL HUB POLYPR SHLD REG BVL

## (undated) DEVICE — GDWIRE URET STR STD .035X150 -- ZIPWIRE STD

## (undated) DEVICE — SYR LR LCK 1ML GRAD NSAF 30ML --

## (undated) DEVICE — SOLUTION LACTATED RINGERS INJECTION USP

## (undated) DEVICE — SOLUTION IV 1000ML 0.9% SOD CHL

## (undated) DEVICE — SOLUTION SCRB 2OZ 10% POVIDONE IOD ANTIMIC BTL

## (undated) DEVICE — CYSTOSCOPY PACK: Brand: CONVERTORS

## (undated) DEVICE — SUTURE MCRYL SZ 2-0 L27IN ABSRB UD SH L26MM TAPERPOINT NDL Y417H

## (undated) DEVICE — TOWEL SURG W17XL27IN STD BLU COT NONFENESTRATED PREWASHED

## (undated) DEVICE — KENDALL SCD EXPRESS SLEEVES, KNEE LENGTH, MEDIUM: Brand: KENDALL SCD

## (undated) DEVICE — HANDLE LT SNAP ON ULT DURABLE LENS FOR TRUMPF ALC DISPOSABLE

## (undated) DEVICE — SMOKE EVACUATION PENCIL: Brand: VALLEYLAB

## (undated) DEVICE — DRAPE,CHEST,FENES,15X10,STERIL: Brand: MEDLINE

## (undated) DEVICE — BLANKET WRM W35.4XL86.6IN FULL UNDERBODY + FORC AIR

## (undated) DEVICE — SUT ETHLN 3-0 18IN PS2 BLK --

## (undated) DEVICE — SYSTEM EVAC SMOKE LAPARSCOPIC

## (undated) DEVICE — GOWN,SIRUS,NONRNF,SETINSLV,XL,20/CS: Brand: MEDLINE

## (undated) DEVICE — 40580 - THE PINK PAD - ADVANCED TRENDELENBURG POSITIONING KIT: Brand: 40580 - THE PINK PAD - ADVANCED TRENDELENBURG POSITIONING KIT

## (undated) DEVICE — DRAIN SURG 19FR 100% SIL RADPQ RND CHN FULL FLUT

## (undated) DEVICE — TRAY PREP DRY W/ PREM GLV 2 APPL 6 SPNG 2 UNDPD 1 OVERWRAP

## (undated) DEVICE — CANISTER, RIGID, 3000CC: Brand: MEDLINE INDUSTRIES, INC.

## (undated) DEVICE — DEVON™ KNEE AND BODY STRAP 60" X 3" (1.5 M X 7.6 CM): Brand: DEVON

## (undated) DEVICE — TUBING IRRIG L96IN DIA0.241IN L BOR T-U-R W/ NVENT PIERCING

## (undated) DEVICE — APPLICATOR BNDG 1MM ADH PREMIERPRO EXOFIN

## (undated) DEVICE — BAG COLLECTION FLD OR-TBL NS --

## (undated) DEVICE — SUT PROL 0 30IN CT1 BLU --

## (undated) DEVICE — RESERVOIR,SUCTION,100CC,SILICONE: Brand: MEDLINE

## (undated) DEVICE — COVER LT HNDL PLAS RIG 1 PER PK

## (undated) DEVICE — STRAP,POSITIONING,KNEE/BODY,FOAM,4X60": Brand: MEDLINE

## (undated) DEVICE — SUTURE MCRYL SZ 3-0 L27IN ABSRB UD L19MM PS-2 3/8 CIR PRIM Y427H